# Patient Record
Sex: FEMALE | Race: BLACK OR AFRICAN AMERICAN | NOT HISPANIC OR LATINO | Employment: PART TIME | URBAN - METROPOLITAN AREA
[De-identification: names, ages, dates, MRNs, and addresses within clinical notes are randomized per-mention and may not be internally consistent; named-entity substitution may affect disease eponyms.]

---

## 2023-08-22 ENCOUNTER — LAB (OUTPATIENT)
Dept: LAB | Facility: LAB | Age: 73
End: 2023-08-22
Payer: MEDICARE

## 2023-08-22 LAB
ERYTHROCYTE DISTRIBUTION WIDTH (RATIO) BY AUTOMATED COUNT: 19.6 % (ref 11.5–14.5)
ERYTHROCYTE MEAN CORPUSCULAR HEMOGLOBIN CONCENTRATION (G/DL) BY AUTOMATED: 27.9 G/DL (ref 32–36)
ERYTHROCYTE MEAN CORPUSCULAR VOLUME (FL) BY AUTOMATED COUNT: 75 FL (ref 80–100)
ERYTHROCYTES (10*6/UL) IN BLOOD BY AUTOMATED COUNT: 3.78 X10E12/L (ref 4–5.2)
HEMATOCRIT (%) IN BLOOD BY AUTOMATED COUNT: 28.3 % (ref 36–46)
HEMOGLOBIN (G/DL) IN BLOOD: 7.9 G/DL (ref 12–16)
LEUKOCYTES (10*3/UL) IN BLOOD BY AUTOMATED COUNT: 7.2 X10E9/L (ref 4.4–11.3)
NRBC (PER 100 WBCS) BY AUTOMATED COUNT: 0 /100 WBC (ref 0–0)
PLATELETS (10*3/UL) IN BLOOD AUTOMATED COUNT: 269 X10E9/L (ref 150–450)

## 2023-09-06 ENCOUNTER — LAB (OUTPATIENT)
Dept: LAB | Facility: LAB | Age: 73
End: 2023-09-06
Payer: MEDICARE

## 2023-09-06 LAB
ALANINE AMINOTRANSFERASE (SGPT) (U/L) IN SER/PLAS: 14 U/L (ref 7–45)
ALBUMIN (G/DL) IN SER/PLAS: 4.2 G/DL (ref 3.4–5)
ALKALINE PHOSPHATASE (U/L) IN SER/PLAS: 77 U/L (ref 33–136)
ANION GAP IN SER/PLAS: 15 MMOL/L (ref 10–20)
ASPARTATE AMINOTRANSFERASE (SGOT) (U/L) IN SER/PLAS: 34 U/L (ref 9–39)
BILIRUBIN DIRECT (MG/DL) IN SER/PLAS: 0.1 MG/DL (ref 0–0.3)
BILIRUBIN TOTAL (MG/DL) IN SER/PLAS: 0.4 MG/DL (ref 0–1.2)
CALCIUM (MG/DL) IN SER/PLAS: 9.5 MG/DL (ref 8.6–10.6)
CARBON DIOXIDE, TOTAL (MMOL/L) IN SER/PLAS: 22 MMOL/L (ref 21–32)
CHLORIDE (MMOL/L) IN SER/PLAS: 106 MMOL/L (ref 98–107)
COBALAMIN (VITAMIN B12) (PG/ML) IN SER/PLAS: 760 PG/ML (ref 211–911)
CREATININE (MG/DL) IN SER/PLAS: 1.07 MG/DL (ref 0.5–1.05)
FOLATE (NG/ML) IN SER/PLAS: 13.7 NG/ML
GFR FEMALE: 55 ML/MIN/1.73M2
GLUCOSE (MG/DL) IN SER/PLAS: 98 MG/DL (ref 74–99)
IRON (UG/DL) IN SER/PLAS: 24 UG/DL (ref 35–150)
IRON BINDING CAPACITY (UG/DL) IN SER/PLAS: >474 UG/DL (ref 240–445)
IRON SATURATION (%) IN SER/PLAS: ABNORMAL % (ref 25–45)
POTASSIUM (MMOL/L) IN SER/PLAS: 4 MMOL/L (ref 3.5–5.3)
PROTEIN TOTAL: 8.8 G/DL (ref 6.4–8.2)
SODIUM (MMOL/L) IN SER/PLAS: 139 MMOL/L (ref 136–145)
UREA NITROGEN (MG/DL) IN SER/PLAS: 19 MG/DL (ref 6–23)

## 2024-02-19 ENCOUNTER — HOSPITAL ENCOUNTER (OUTPATIENT)
Dept: RADIOLOGY | Facility: HOSPITAL | Age: 74
Discharge: HOME | End: 2024-02-19
Payer: MEDICARE

## 2024-02-19 ENCOUNTER — OFFICE VISIT (OUTPATIENT)
Dept: PULMONOLOGY | Facility: HOSPITAL | Age: 74
End: 2024-02-19
Payer: MEDICARE

## 2024-02-19 VITALS
BODY MASS INDEX: 27.46 KG/M2 | TEMPERATURE: 96.8 F | OXYGEN SATURATION: 95 % | SYSTOLIC BLOOD PRESSURE: 114 MMHG | DIASTOLIC BLOOD PRESSURE: 77 MMHG | HEART RATE: 93 BPM | WEIGHT: 160 LBS

## 2024-02-19 DIAGNOSIS — J43.9 PULMONARY EMPHYSEMA, UNSPECIFIED EMPHYSEMA TYPE (MULTI): ICD-10-CM

## 2024-02-19 DIAGNOSIS — F17.200 CURRENT SMOKER: ICD-10-CM

## 2024-02-19 DIAGNOSIS — R91.1 LUNG NODULE SEEN ON IMAGING STUDY: Primary | ICD-10-CM

## 2024-02-19 DIAGNOSIS — M54.9 DORSALGIA, UNSPECIFIED: ICD-10-CM

## 2024-02-19 PROCEDURE — 72110 X-RAY EXAM L-2 SPINE 4/>VWS: CPT

## 2024-02-19 PROCEDURE — 99213 OFFICE O/P EST LOW 20 MIN: CPT | Performed by: INTERNAL MEDICINE

## 2024-02-19 PROCEDURE — 99213 OFFICE O/P EST LOW 20 MIN: CPT | Mod: GC | Performed by: INTERNAL MEDICINE

## 2024-02-19 PROCEDURE — 73521 X-RAY EXAM HIPS BI 2 VIEWS: CPT

## 2024-02-19 PROCEDURE — 1126F AMNT PAIN NOTED NONE PRSNT: CPT | Performed by: INTERNAL MEDICINE

## 2024-02-19 PROCEDURE — 72110 X-RAY EXAM L-2 SPINE 4/>VWS: CPT | Performed by: RADIOLOGY

## 2024-02-19 PROCEDURE — 1159F MED LIST DOCD IN RCRD: CPT | Performed by: INTERNAL MEDICINE

## 2024-02-19 SDOH — ECONOMIC STABILITY: FOOD INSECURITY: WITHIN THE PAST 12 MONTHS, YOU WORRIED THAT YOUR FOOD WOULD RUN OUT BEFORE YOU GOT MONEY TO BUY MORE.: NEVER TRUE

## 2024-02-19 SDOH — ECONOMIC STABILITY: FOOD INSECURITY: WITHIN THE PAST 12 MONTHS, THE FOOD YOU BOUGHT JUST DIDN'T LAST AND YOU DIDN'T HAVE MONEY TO GET MORE.: NEVER TRUE

## 2024-02-19 ASSESSMENT — LIFESTYLE VARIABLES
HOW OFTEN DO YOU HAVE SIX OR MORE DRINKS ON ONE OCCASION: NEVER
SKIP TO QUESTIONS 9-10: 1
HOW OFTEN DO YOU HAVE A DRINK CONTAINING ALCOHOL: MONTHLY OR LESS
HOW MANY STANDARD DRINKS CONTAINING ALCOHOL DO YOU HAVE ON A TYPICAL DAY: 1 OR 2
AUDIT-C TOTAL SCORE: 1

## 2024-02-19 ASSESSMENT — PATIENT HEALTH QUESTIONNAIRE - PHQ9
1. LITTLE INTEREST OR PLEASURE IN DOING THINGS: NOT AT ALL
2. FEELING DOWN, DEPRESSED OR HOPELESS: NOT AT ALL
SUM OF ALL RESPONSES TO PHQ9 QUESTIONS 1 & 2: 0

## 2024-02-19 ASSESSMENT — PAIN SCALES - GENERAL: PAINLEVEL: 0-NO PAIN

## 2024-02-19 NOTE — PATIENT INSTRUCTIONS
Thank you for coming today Ms. Holt!    We discussed the following today:  - for the nodule in your lung, it has been stable on the last CT scan in June 2023. You have CT scan requested in June 2024 to follow up. It is ordered & you just need to confirm the appointment with radiology.  - We highly recommend you to stop smoking completely  - Your blood count is low. You are already scheduled for colonoscopy & please follow up with your PCP regarding low blood count & iron deficiency anemia.    We will see you in 4-6 months after CT scan with lung function test at the time of appointment.

## 2024-02-26 PROBLEM — F17.200 CURRENT SMOKER: Status: ACTIVE | Noted: 2024-02-26

## 2024-02-26 PROBLEM — R91.1 LUNG NODULE SEEN ON IMAGING STUDY: Status: ACTIVE | Noted: 2024-02-26

## 2024-02-26 PROBLEM — J43.9 PULMONARY EMPHYSEMA (MULTI): Status: ACTIVE | Noted: 2024-02-26

## 2024-02-26 NOTE — PROGRESS NOTES
Department of Medicine I Division of Pulmonary, Critical Care, and Sleep Medicine   [address] [place] [address]  Phone: [phone]  Fax: [phone]    History of Present Illness   Megan Holt is a 73 y.o. female with history of pulmonary nodules, current smoker, hypertension and right breast cancer (s/p mastectomy in 1993 s/p tamoxifen for 5 years) presents to the pulmonary clinic for f/u.     Patient denies shortness of breath. Endorses chronic cough that has been going on for the last 10 years. Cough is more in the morning, rarely productive. Currently smokes 5-6 cigarettes/day. Denies fevers, weight loss or night sweats. Denies chest pain or palpitation.     CT chest done in November 2022, as part of lung cancer screening, showed multiple nodules, largest one was measuring 7 mm. Repeat CT chest 6/2023 stable.    PFT 8/2023 showed normal spirometry & lung volumes with low DLCO. At that petros, CBC showed Hgb & Iron studies showed CAMRON. Patient denies [place] or PV bleeding or melena. At that time, Patient instructed to follow up with her PCP & referred for screening colonoscopy.     PMH: As above  FH: Denies family history of lung disease  SH: Started smoking as a teenager. Current smoker, now down to 5 cigarettes a day.     ROS:  12 points ROS is otherwise negative except as per HPI     Testing:  PFT: 8/17/2023  normal spirometry & lung volumes. DLCO 38% (uncorrected)     Previous imaging:   CT chest done in November 2022, as part of lung cancer screening, showed multiple nodules, largest one was measuring 7 mm. Repeat CT chest 6/2023 stable     6MWT: 8/17/2023  walked 317 meters, SpO2 99% >> 95%        Past Medical History   above    Immunizations     Immunization History   Administered Date(s) Administered    Moderna SARS-CoV-2 Vaccination 05/13/2021, 06/10/2021, 02/16/2022       Medications and Allergies   No current outpatient medications   Patient has no allergy information on  record.    Social History   above    Family History   above    Surgical History   She has a past surgical history that includes Other surgical history (03/24/2022).    Physical Exam   /77   Pulse 93   Temp 36 °C (96.8 °F)   Wt 72.6 kg (160 lb)   SpO2 95%   BMI 27.46 kg/m²      Physical Exam  Constitutional:       General: She is not in acute distress.  Cardiovascular:      Rate and Rhythm: Normal rate and regular rhythm.      Heart sounds: No murmur heard.  Pulmonary:      Effort: No respiratory distress.      Breath sounds: Normal breath sounds. No stridor. No wheezing.   Abdominal:      General: There is no distension.      Palpations: Abdomen is soft.      Tenderness: There is no abdominal tenderness.   Skin:     General: Skin is warm and dry.   Neurological:      General: No focal deficit present.      Mental Status: She is alert and oriented to person, place, and time.          Results   Pulmonary Function Tests:  PFT: 8/17/2023  normal spirometry & lung volumes. DLCO 38% (uncorrected)     6MWT: 8/17/2023  walked 317 meters, SpO2 99% >> 95%       Chest CT Scan:  CT lung screening follow up CT chest wo IV contrast 06/27/2023  Impression  1. There are no suspicious parenchymal lung nodules. Stable right  lower lobe pleural-based opacity is most likely atelectatic in nature  and continued surveillance with 1 year low-dose chest CT is  recommended.  2. Thyromegaly with heterogeneity of the thyroid gland and  correlation with ultrasound is recommended.  3. Exophytic thyroid cysts.      Labs:  Lab Results   Component Value Date    WBC 7.2 08/22/2023    HGB 7.9 (L) 08/22/2023    HCT 28.3 (L) 08/22/2023    MCV 75 (L) 08/22/2023     08/22/2023       Lab Results   Component Value Date    CREATININE 1.07 (H) 09/06/2023    BUN 19 09/06/2023     09/06/2023    K 4.0 09/06/2023     09/06/2023    CO2 22 09/06/2023        Lab Results   Component Value Date    SEDRATE 17 04/16/2018        IgE: No  "results found for: \"IGE\"   Respiratory Allergy Panel:  AEC:   Eosinophils Absolute (x10E9/L)   Date Value   04/16/2018 0.07     Eosinophils % (%)   Date Value   04/16/2018 1.5       Cultures:  No results found for: \"AFBCX\"   No results found for: \"RESPCULTCYFI\"    No results found for the last 90 days.  C     Assessment and Plan     Problem List Items Addressed This Visit    None  Visit Diagnoses         Codes    Lung nodule seen on imaging study    -  Primary R91.1    Relevant Orders    Follow Up In Pulmonology    Pulmonary emphysema, unspecified emphysema type (CMS/[facility])     J43.9    Relevant Orders    Complete Pulmonary Function Test Pre/Post Bronchodialator (Spirometry Pre/Post/DLCO/Lung Volumes)    Follow Up In Pulmonology          73-year-old female with history of pulmonary nodules, current smoker, hypertension and right breast cancer (s/p mastectomy in 1993 s/p tamoxifen for 5 years) presents to the pulmonary clinic for follow up.     #Pulmonary nodules  - stable on most recent CT chest  - plan repeat CT chest in June 2024     #Tobacco use:  - counseling done for cessation, offered quitting aids     #low DLCO:  - PFT showed substantially low DLCO, with normal spirometry & TLC  - CBC consistent with iron deficiency anemia  - repeat PFTs next visit    #Anemia:  - f/up with PCP  - already referred for screening colonoscopy             Follow-up:  4-6 months    Mg Sweeney MD  02/19/2024  "

## 2024-05-06 ENCOUNTER — HOSPITAL ENCOUNTER (OUTPATIENT)
Dept: RADIOLOGY | Facility: HOSPITAL | Age: 74
Discharge: HOME | End: 2024-05-06
Payer: MEDICARE

## 2024-05-06 VITALS — WEIGHT: 165 LBS | BODY MASS INDEX: 28.17 KG/M2 | HEIGHT: 64 IN

## 2024-05-06 DIAGNOSIS — Z12.31 ENCOUNTER FOR SCREENING MAMMOGRAM FOR MALIGNANT NEOPLASM OF BREAST: ICD-10-CM

## 2024-05-06 PROCEDURE — 77067 SCR MAMMO BI INCL CAD: CPT | Mod: LEFT SIDE | Performed by: RADIOLOGY

## 2024-05-06 PROCEDURE — 77063 BREAST TOMOSYNTHESIS BI: CPT | Mod: LEFT SIDE | Performed by: RADIOLOGY

## 2024-05-06 PROCEDURE — 77067 SCR MAMMO BI INCL CAD: CPT | Mod: LT

## 2024-05-10 DIAGNOSIS — F17.210 SMOKING GREATER THAN 40 PACK YEARS: Primary | ICD-10-CM

## 2024-05-10 DIAGNOSIS — F17.210 TOBACCO SMOKER, 1 PACK OF CIGARETTES OR LESS PER DAY: ICD-10-CM

## 2024-06-10 ENCOUNTER — OFFICE VISIT (OUTPATIENT)
Dept: PULMONOLOGY | Facility: HOSPITAL | Age: 74
End: 2024-06-10
Payer: MEDICARE

## 2024-06-10 VITALS
TEMPERATURE: 97.3 F | DIASTOLIC BLOOD PRESSURE: 86 MMHG | HEART RATE: 96 BPM | SYSTOLIC BLOOD PRESSURE: 120 MMHG | OXYGEN SATURATION: 99 % | BODY MASS INDEX: 27.12 KG/M2 | WEIGHT: 158 LBS

## 2024-06-10 DIAGNOSIS — J43.9 PULMONARY EMPHYSEMA, UNSPECIFIED EMPHYSEMA TYPE (MULTI): ICD-10-CM

## 2024-06-10 DIAGNOSIS — F17.210 SMOKING GREATER THAN 20 PACK YEARS: Primary | ICD-10-CM

## 2024-06-10 DIAGNOSIS — R91.1 LUNG NODULE SEEN ON IMAGING STUDY: ICD-10-CM

## 2024-06-10 PROCEDURE — 1126F AMNT PAIN NOTED NONE PRSNT: CPT | Performed by: INTERNAL MEDICINE

## 2024-06-10 PROCEDURE — 99213 OFFICE O/P EST LOW 20 MIN: CPT | Mod: GC | Performed by: INTERNAL MEDICINE

## 2024-06-10 PROCEDURE — 99213 OFFICE O/P EST LOW 20 MIN: CPT | Performed by: INTERNAL MEDICINE

## 2024-06-10 RX ORDER — LISINOPRIL 20 MG/1
20 TABLET ORAL DAILY
COMMUNITY

## 2024-06-10 RX ORDER — HYDROCHLOROTHIAZIDE 25 MG/1
25 TABLET ORAL DAILY
COMMUNITY

## 2024-06-10 RX ORDER — AMLODIPINE BESYLATE 5 MG/1
5 TABLET ORAL DAILY
COMMUNITY

## 2024-06-10 ASSESSMENT — PAIN SCALES - GENERAL: PAINLEVEL: 0-NO PAIN

## 2024-06-10 NOTE — PROGRESS NOTES
Department of Medicine I Division of Pulmonary, Critical Care, and Sleep Medicine   [address] [place] [address]  Phone: [phone]  Fax: [phone]    History of Present Illness   Megan Holt is a 74 y.o. female with history of pulmonary nodules, current smoker, hypertension and right breast cancer (s/p mastectomy in 1993 s/p tamoxifen for 5 years) presents to the pulmonary clinic for f/u.     Patient denies shortness of breath. Endorses chronic on/off cough that has been going on for the last 10 years. Still smoking, currently smokes 5-6 cigarettes/day. Denies fevers, weight loss or night sweats. Denies chest pain or palpitation.     CT chest done in November 2022, as part of lung cancer screening, showed multiple nodules, largest one was measuring 7 mm. Repeat CT chest 6/2023 stable.     PFT 8/2023 showed normal spirometry & lung volumes with low DLCO. At that petros, CBC showed Hgb & Iron studies showed CAMRON. Patient denies [place] or PV bleeding or melena. At that time, Patient instructed to follow up with her PCP & referred for screening colonoscopy, however, she didn't perform the colonoscopy.     PMH: As above  FH: Denies family history of lung disease  SH: Started smoking as a teenager. Current smoker, now down to 5 cigarettes a day.     ROS:  12 points ROS is otherwise negative except as per HPI     Testing:  PFT: 8/17/2023  normal spirometry & lung volumes. DLCO 38% (uncorrected)     Previous imaging:   CT chest done in November 2022, as part of lung cancer screening, showed multiple nodules, largest one was measuring 7 mm. Repeat CT chest 6/2023 stable     6MWT: 8/17/2023  walked 317 meters, SpO2 99% >> 95%      Review of Systems  Review of Systems  All other review of systems are negative and/or non-contributory.    Past Medical History   She has a past medical history of Breast cancer (Multi) and Current smoker (02/26/2024).    Immunizations     Immunization History  "  Administered Date(s) Administered    Moderna SARS-CoV-2 Vaccination 05/13/2021, 06/10/2021, 02/16/2022       Medications and Allergies   No current outpatient medications   Patient has no allergy information on record.    Social History   She reports that she has been smoking cigarettes. She does not have any smokeless tobacco history on file. She reports current alcohol use. She reports that she does not currently use drugs after having used the following drugs: Marijuana and \"Crack\" cocaine.      Family History     Family History   Problem Relation Name Age of Onset    Breast cancer Sister           Surgical History   She has a past surgical history that includes Other surgical history (03/24/2022) and Mastectomy.    Physical Exam   /86   Pulse 96   Temp 36.3 °C (97.3 °F)   Wt 71.7 kg (158 lb)   LMP  (LMP Unknown)   SpO2 99% Comment: RA  BMI 27.12 kg/m²       Physical Exam   Constitutional:       General: She is not in acute distress.  Cardiovascular:      Rate and Rhythm: Normal rate and regular rhythm.      Heart sounds: No murmur heard.  Pulmonary:      Effort: No respiratory distress.      Breath sounds: Normal breath sounds. No stridor. No wheezing.   Abdominal:      General: There is no distension.      Palpations: Abdomen is soft.      Tenderness: There is no abdominal tenderness.   Skin:     General: Skin is warm and dry.   Neurological:      General: No focal deficit present.      Mental Status: She is alert and oriented to person, place, and time.   Results   Pulmonary Function Tests:  PFT: 8/17/2023  normal spirometry & lung volumes. DLCO 38% (uncorrected)     6MWT: 8/17/2023  walked 317 meters, SpO2 99% >> 95%       Chest CT Scan:  CT lung screening follow up CT chest wo IV contrast 06/27/2023  Impression  1. There are no suspicious parenchymal lung nodules. Stable right  lower lobe pleural-based opacity is most likely atelectatic in nature  and continued surveillance with 1 year low-dose " "chest CT is  recommended.  2. Thyromegaly with heterogeneity of the thyroid gland and  correlation with ultrasound is recommended.  3. Exophytic thyroid cysts.       ECHO:  No results found for this or any previous visit from the past 365 days.       Labs:  Lab Results   Component Value Date    WBC 7.2 08/22/2023    HGB 7.9 (L) 08/22/2023    HCT 28.3 (L) 08/22/2023    MCV 75 (L) 08/22/2023     08/22/2023       Lab Results   Component Value Date    CREATININE 1.07 (H) 09/06/2023    BUN 19 09/06/2023     09/06/2023    K 4.0 09/06/2023     09/06/2023    CO2 22 09/06/2023        Lab Results   Component Value Date    SEDRATE 17 04/16/2018        IgE: No results found for: \"IGE\"   Respiratory Allergy Panel:  AEC:   Eosinophils Absolute (x10E9/L)   Date Value   04/16/2018 0.07     Eosinophils % (%)   Date Value   04/16/2018 1.5       Cultures:  No results found for: \"AFBCX\"   No results found for: \"RESPCULTCYFI\"    No results found for the last 90 days.  C     Assessment and Plan       74-year-old female with history of pulmonary nodules, current smoker, hypertension and right breast cancer (s/p mastectomy in 1993 s/p tamoxifen for 5 years) presents to the pulmonary clinic for follow up.     #Pulmonary nodules  - stable on CT chest last year  - Due for repeat follow up CT chest in June 2024     #Tobacco use:  - counseling done for cessation, offered quitting aids  - referral to smoking cessation aids     #low DLCO:  - PFT showed substantially low DLCO, with normal spirometry & TLC  - CBC consistent with iron deficiency anemia  - repeat PFTs next visit     #Anemia:  - f/up with PCP (Dr. Kelly at [facility])  - Recommend age appropriate cancer screening including colonoscopy           Follow-up: 6 months    Mg Sweeney MD  06/10/2024  "

## 2024-07-22 ENCOUNTER — APPOINTMENT (OUTPATIENT)
Dept: RADIOLOGY | Facility: HOSPITAL | Age: 74
End: 2024-07-22
Payer: MEDICARE

## 2024-08-19 ENCOUNTER — APPOINTMENT (OUTPATIENT)
Dept: PULMONOLOGY | Facility: HOSPITAL | Age: 74
End: 2024-08-19
Payer: MEDICARE

## 2024-09-12 ENCOUNTER — APPOINTMENT (OUTPATIENT)
Dept: PULMONOLOGY | Facility: HOSPITAL | Age: 74
End: 2024-09-12
Payer: MEDICARE

## 2024-10-31 ENCOUNTER — APPOINTMENT (OUTPATIENT)
Dept: PULMONOLOGY | Facility: HOSPITAL | Age: 74
End: 2024-10-31
Payer: MEDICARE

## 2024-12-11 ENCOUNTER — CLINICAL SUPPORT (OUTPATIENT)
Dept: EMERGENCY MEDICINE | Facility: HOSPITAL | Age: 74
End: 2024-12-11
Payer: MEDICARE

## 2024-12-11 ENCOUNTER — APPOINTMENT (OUTPATIENT)
Dept: RADIOLOGY | Facility: HOSPITAL | Age: 74
End: 2024-12-11
Payer: MEDICARE

## 2024-12-11 ENCOUNTER — HOSPITAL ENCOUNTER (INPATIENT)
Facility: HOSPITAL | Age: 74
End: 2024-12-11
Attending: EMERGENCY MEDICINE | Admitting: STUDENT IN AN ORGANIZED HEALTH CARE EDUCATION/TRAINING PROGRAM
Payer: MEDICARE

## 2024-12-11 DIAGNOSIS — Z01.89 NEED FOR PHYSICAL THERAPY ASSESSMENT: ICD-10-CM

## 2024-12-11 DIAGNOSIS — D50.9 IRON DEFICIENCY ANEMIA, UNSPECIFIED IRON DEFICIENCY ANEMIA TYPE: ICD-10-CM

## 2024-12-11 DIAGNOSIS — J18.9 COMMUNITY ACQUIRED PNEUMONIA, UNSPECIFIED LATERALITY: ICD-10-CM

## 2024-12-11 DIAGNOSIS — N12 PYELONEPHRITIS: ICD-10-CM

## 2024-12-11 DIAGNOSIS — N17.9 AKI (ACUTE KIDNEY INJURY) (CMS-HCC): Primary | ICD-10-CM

## 2024-12-11 DIAGNOSIS — I38 ENDOCARDITIS, VALVE UNSPECIFIED: ICD-10-CM

## 2024-12-11 DIAGNOSIS — F17.210 SMOKING GREATER THAN 20 PACK YEARS: ICD-10-CM

## 2024-12-11 DIAGNOSIS — R91.1 LUNG NODULE SEEN ON IMAGING STUDY: ICD-10-CM

## 2024-12-11 DIAGNOSIS — J43.2 CENTRILOBULAR EMPHYSEMA (MULTI): ICD-10-CM

## 2024-12-11 DIAGNOSIS — J43.9 PULMONARY EMPHYSEMA, UNSPECIFIED EMPHYSEMA TYPE (MULTI): ICD-10-CM

## 2024-12-11 DIAGNOSIS — R79.89 ELEVATED TROPONIN: ICD-10-CM

## 2024-12-11 DIAGNOSIS — E87.6 HYPOKALEMIA: ICD-10-CM

## 2024-12-11 LAB
ABO GROUP (TYPE) IN BLOOD: NORMAL
ABO GROUP (TYPE) IN BLOOD: NORMAL
ALBUMIN SERPL BCP-MCNC: 2.9 G/DL (ref 3.4–5)
ALBUMIN SERPL BCP-MCNC: 3.6 G/DL (ref 3.4–5)
ALP SERPL-CCNC: 78 U/L (ref 33–136)
ALT SERPL W P-5'-P-CCNC: 13 U/L (ref 7–45)
ANION GAP BLDV CALCULATED.4IONS-SCNC: 12 MMOL/L (ref 10–25)
ANION GAP SERPL CALC-SCNC: 13 MMOL/L (ref 10–20)
ANION GAP SERPL CALC-SCNC: 23 MMOL/L (ref 10–20)
ANTIBODY SCREEN: NORMAL
APPEARANCE UR: CLEAR
AST SERPL W P-5'-P-CCNC: 40 U/L (ref 9–39)
ATRIAL RATE: 144 BPM
BASE EXCESS BLDV CALC-SCNC: -4.6 MMOL/L (ref -2–3)
BASOPHILS # BLD AUTO: 0.01 X10*3/UL (ref 0–0.1)
BASOPHILS NFR BLD AUTO: 0 %
BILIRUB SERPL-MCNC: 0.5 MG/DL (ref 0–1.2)
BILIRUB UR STRIP.AUTO-MCNC: NEGATIVE MG/DL
BLOOD EXPIRATION DATE: NORMAL
BNP SERPL-MCNC: 197 PG/ML (ref 0–99)
BODY TEMPERATURE: 37 DEGREES CELSIUS
BUN SERPL-MCNC: 41 MG/DL (ref 6–23)
BUN SERPL-MCNC: 43 MG/DL (ref 6–23)
CA-I BLDV-SCNC: 1.12 MMOL/L (ref 1.1–1.33)
CALCIUM SERPL-MCNC: 8.1 MG/DL (ref 8.6–10.6)
CALCIUM SERPL-MCNC: 9.5 MG/DL (ref 8.6–10.6)
CARDIAC TROPONIN I PNL SERPL HS: 105 NG/L (ref 0–34)
CARDIAC TROPONIN I PNL SERPL HS: 184 NG/L (ref 0–34)
CARDIAC TROPONIN I PNL SERPL HS: 603 NG/L (ref 0–34)
CHLORIDE BLDV-SCNC: 108 MMOL/L (ref 98–107)
CHLORIDE SERPL-SCNC: 100 MMOL/L (ref 98–107)
CHLORIDE SERPL-SCNC: 104 MMOL/L (ref 98–107)
CO2 SERPL-SCNC: 16 MMOL/L (ref 21–32)
CO2 SERPL-SCNC: 20 MMOL/L (ref 21–32)
COLOR UR: ABNORMAL
CREAT SERPL-MCNC: 1.73 MG/DL (ref 0.5–1.05)
CREAT SERPL-MCNC: 1.79 MG/DL (ref 0.5–1.05)
DISPENSE STATUS: NORMAL
EGFRCR SERPLBLD CKD-EPI 2021: 29 ML/MIN/1.73M*2
EGFRCR SERPLBLD CKD-EPI 2021: 31 ML/MIN/1.73M*2
EOSINOPHIL # BLD AUTO: 0 X10*3/UL (ref 0–0.4)
EOSINOPHIL NFR BLD AUTO: 0 %
ERYTHROCYTE [DISTWIDTH] IN BLOOD BY AUTOMATED COUNT: 17.4 % (ref 11.5–14.5)
FLUAV RNA RESP QL NAA+PROBE: NOT DETECTED
FLUBV RNA RESP QL NAA+PROBE: NOT DETECTED
GLUCOSE BLDV-MCNC: 119 MG/DL (ref 74–99)
GLUCOSE SERPL-MCNC: 133 MG/DL (ref 74–99)
GLUCOSE SERPL-MCNC: 174 MG/DL (ref 74–99)
GLUCOSE UR STRIP.AUTO-MCNC: NORMAL MG/DL
HCO3 BLDV-SCNC: 19.6 MMOL/L (ref 22–26)
HCT VFR BLD AUTO: 20.1 % (ref 36–46)
HCT VFR BLD EST: 19 % (ref 36–46)
HGB BLD-MCNC: 6.3 G/DL (ref 12–16)
HGB BLDV-MCNC: 6.4 G/DL (ref 12–16)
HOLD SPECIMEN: NORMAL
IMM GRANULOCYTES # BLD AUTO: 0.22 X10*3/UL (ref 0–0.5)
IMM GRANULOCYTES NFR BLD AUTO: 1.1 % (ref 0–0.9)
INHALED O2 CONCENTRATION: 21 %
KETONES UR STRIP.AUTO-MCNC: NEGATIVE MG/DL
LACTATE BLDV-SCNC: 1.1 MMOL/L (ref 0.4–2)
LACTATE SERPL-SCNC: 0.4 MMOL/L (ref 0.4–2)
LACTATE SERPL-SCNC: 6.8 MMOL/L (ref 0.4–2)
LDH SERPL L TO P-CCNC: 281 U/L (ref 84–246)
LEUKOCYTE ESTERASE UR QL STRIP.AUTO: ABNORMAL
LYMPHOCYTES # BLD AUTO: 0.78 X10*3/UL (ref 0.8–3)
LYMPHOCYTES NFR BLD AUTO: 3.9 %
MAGNESIUM SERPL-MCNC: 1.95 MG/DL (ref 1.6–2.4)
MCH RBC QN AUTO: 20.7 PG (ref 26–34)
MCHC RBC AUTO-ENTMCNC: 31.3 G/DL (ref 32–36)
MCV RBC AUTO: 66 FL (ref 80–100)
MONOCYTES # BLD AUTO: 0.59 X10*3/UL (ref 0.05–0.8)
MONOCYTES NFR BLD AUTO: 2.9 %
MRSA DNA SPEC QL NAA+PROBE: NOT DETECTED
MUCOUS THREADS #/AREA URNS AUTO: ABNORMAL /LPF
NEUTROPHILS # BLD AUTO: 18.62 X10*3/UL (ref 1.6–5.5)
NEUTROPHILS NFR BLD AUTO: 92.1 %
NITRITE UR QL STRIP.AUTO: NEGATIVE
NRBC BLD-RTO: 0.8 /100 WBCS (ref 0–0)
OXYHGB MFR BLDV: 70.2 % (ref 45–75)
P AXIS: 61 DEGREES
P OFFSET: 206 MS
P ONSET: 159 MS
PCO2 BLDV: 31 MM HG (ref 41–51)
PH BLDV: 7.41 PH (ref 7.33–7.43)
PH UR STRIP.AUTO: 5.5 [PH]
PHOSPHATE SERPL-MCNC: 2.7 MG/DL (ref 2.5–4.9)
PLATELET # BLD AUTO: 367 X10*3/UL (ref 150–450)
PO2 BLDV: 42 MM HG (ref 35–45)
POTASSIUM BLDV-SCNC: 3 MMOL/L (ref 3.5–5.3)
POTASSIUM SERPL-SCNC: 2.9 MMOL/L (ref 3.5–5.3)
POTASSIUM SERPL-SCNC: 2.9 MMOL/L (ref 3.5–5.3)
PR INTERVAL: 138 MS
PRODUCT BLOOD TYPE: 5100
PRODUCT CODE: NORMAL
PROT SERPL-MCNC: 8.7 G/DL (ref 6.4–8.2)
PROT UR STRIP.AUTO-MCNC: ABNORMAL MG/DL
Q ONSET: 228 MS
QRS COUNT: 23 BEATS
QRS DURATION: 82 MS
QT INTERVAL: 254 MS
QTC CALCULATION(BAZETT): 393 MS
QTC FREDERICIA: 339 MS
R AXIS: 18 DEGREES
RBC # BLD AUTO: 3.04 X10*6/UL (ref 4–5.2)
RBC # UR STRIP.AUTO: ABNORMAL /UL
RBC #/AREA URNS AUTO: ABNORMAL /HPF
RH FACTOR (ANTIGEN D): NORMAL
RH FACTOR (ANTIGEN D): NORMAL
RSV RNA RESP QL NAA+PROBE: NOT DETECTED
SAO2 % BLDV: 72 % (ref 45–75)
SARS-COV-2 RNA RESP QL NAA+PROBE: NOT DETECTED
SODIUM BLDV-SCNC: 137 MMOL/L (ref 136–145)
SODIUM SERPL-SCNC: 134 MMOL/L (ref 136–145)
SODIUM SERPL-SCNC: 136 MMOL/L (ref 136–145)
SP GR UR STRIP.AUTO: 1.02
SQUAMOUS #/AREA URNS AUTO: ABNORMAL /HPF
T AXIS: 69 DEGREES
T OFFSET: 355 MS
UNIT ABO: NORMAL
UNIT NUMBER: NORMAL
UNIT RH: NORMAL
UNIT VOLUME: 350
UROBILINOGEN UR STRIP.AUTO-MCNC: NORMAL MG/DL
VENTRICULAR RATE: 144 BPM
WBC # BLD AUTO: 20.2 X10*3/UL (ref 4.4–11.3)
WBC #/AREA URNS AUTO: ABNORMAL /HPF
XM INTEP: NORMAL

## 2024-12-11 PROCEDURE — 74177 CT ABD & PELVIS W/CONTRAST: CPT | Performed by: RADIOLOGY

## 2024-12-11 PROCEDURE — 82435 ASSAY OF BLOOD CHLORIDE: CPT

## 2024-12-11 PROCEDURE — 81001 URINALYSIS AUTO W/SCOPE: CPT | Performed by: EMERGENCY MEDICINE

## 2024-12-11 PROCEDURE — 96367 TX/PROPH/DG ADDL SEQ IV INF: CPT

## 2024-12-11 PROCEDURE — 87493 C DIFF AMPLIFIED PROBE: CPT | Performed by: STUDENT IN AN ORGANIZED HEALTH CARE EDUCATION/TRAINING PROGRAM

## 2024-12-11 PROCEDURE — 36415 COLL VENOUS BLD VENIPUNCTURE: CPT | Performed by: PHYSICIAN ASSISTANT

## 2024-12-11 PROCEDURE — 87899 AGENT NOS ASSAY W/OPTIC: CPT | Performed by: STUDENT IN AN ORGANIZED HEALTH CARE EDUCATION/TRAINING PROGRAM

## 2024-12-11 PROCEDURE — 86923 COMPATIBILITY TEST ELECTRIC: CPT

## 2024-12-11 PROCEDURE — 2500000004 HC RX 250 GENERAL PHARMACY W/ HCPCS (ALT 636 FOR OP/ED): Performed by: EMERGENCY MEDICINE

## 2024-12-11 PROCEDURE — 83735 ASSAY OF MAGNESIUM: CPT | Performed by: EMERGENCY MEDICINE

## 2024-12-11 PROCEDURE — 99291 CRITICAL CARE FIRST HOUR: CPT | Performed by: EMERGENCY MEDICINE

## 2024-12-11 PROCEDURE — 36415 COLL VENOUS BLD VENIPUNCTURE: CPT | Performed by: EMERGENCY MEDICINE

## 2024-12-11 PROCEDURE — 84484 ASSAY OF TROPONIN QUANT: CPT | Performed by: EMERGENCY MEDICINE

## 2024-12-11 PROCEDURE — 2500000001 HC RX 250 WO HCPCS SELF ADMINISTERED DRUGS (ALT 637 FOR MEDICARE OP)

## 2024-12-11 PROCEDURE — 36430 TRANSFUSION BLD/BLD COMPNT: CPT

## 2024-12-11 PROCEDURE — 87449 NOS EACH ORGANISM AG IA: CPT | Performed by: STUDENT IN AN ORGANIZED HEALTH CARE EDUCATION/TRAINING PROGRAM

## 2024-12-11 PROCEDURE — 83880 ASSAY OF NATRIURETIC PEPTIDE: CPT | Performed by: EMERGENCY MEDICINE

## 2024-12-11 PROCEDURE — 86901 BLOOD TYPING SEROLOGIC RH(D): CPT | Performed by: PHYSICIAN ASSISTANT

## 2024-12-11 PROCEDURE — 2500000004 HC RX 250 GENERAL PHARMACY W/ HCPCS (ALT 636 FOR OP/ED)

## 2024-12-11 PROCEDURE — 87506 IADNA-DNA/RNA PROBE TQ 6-11: CPT | Performed by: STUDENT IN AN ORGANIZED HEALTH CARE EDUCATION/TRAINING PROGRAM

## 2024-12-11 PROCEDURE — 71275 CT ANGIOGRAPHY CHEST: CPT

## 2024-12-11 PROCEDURE — 85025 COMPLETE CBC W/AUTO DIFF WBC: CPT | Performed by: EMERGENCY MEDICINE

## 2024-12-11 PROCEDURE — 87324 CLOSTRIDIUM AG IA: CPT | Performed by: STUDENT IN AN ORGANIZED HEALTH CARE EDUCATION/TRAINING PROGRAM

## 2024-12-11 PROCEDURE — 2550000001 HC RX 255 CONTRASTS: Performed by: EMERGENCY MEDICINE

## 2024-12-11 PROCEDURE — 1200000002 HC GENERAL ROOM WITH TELEMETRY DAILY

## 2024-12-11 PROCEDURE — 71045 X-RAY EXAM CHEST 1 VIEW: CPT | Performed by: RADIOLOGY

## 2024-12-11 PROCEDURE — 93005 ELECTROCARDIOGRAM TRACING: CPT

## 2024-12-11 PROCEDURE — 87040 BLOOD CULTURE FOR BACTERIA: CPT | Performed by: EMERGENCY MEDICINE

## 2024-12-11 PROCEDURE — 83605 ASSAY OF LACTIC ACID: CPT | Performed by: PHYSICIAN ASSISTANT

## 2024-12-11 PROCEDURE — 93010 ELECTROCARDIOGRAM REPORT: CPT | Performed by: EMERGENCY MEDICINE

## 2024-12-11 PROCEDURE — 71045 X-RAY EXAM CHEST 1 VIEW: CPT

## 2024-12-11 PROCEDURE — P9016 RBC LEUKOCYTES REDUCED: HCPCS

## 2024-12-11 PROCEDURE — 74177 CT ABD & PELVIS W/CONTRAST: CPT

## 2024-12-11 PROCEDURE — 80053 COMPREHEN METABOLIC PANEL: CPT | Performed by: EMERGENCY MEDICINE

## 2024-12-11 PROCEDURE — 96366 THER/PROPH/DIAG IV INF ADDON: CPT

## 2024-12-11 PROCEDURE — 71275 CT ANGIOGRAPHY CHEST: CPT | Performed by: STUDENT IN AN ORGANIZED HEALTH CARE EDUCATION/TRAINING PROGRAM

## 2024-12-11 PROCEDURE — 84484 ASSAY OF TROPONIN QUANT: CPT | Performed by: PHYSICIAN ASSISTANT

## 2024-12-11 PROCEDURE — 96365 THER/PROPH/DIAG IV INF INIT: CPT

## 2024-12-11 PROCEDURE — 2500000002 HC RX 250 W HCPCS SELF ADMINISTERED DRUGS (ALT 637 FOR MEDICARE OP, ALT 636 FOR OP/ED): Performed by: PHYSICIAN ASSISTANT

## 2024-12-11 PROCEDURE — 87086 URINE CULTURE/COLONY COUNT: CPT | Performed by: EMERGENCY MEDICINE

## 2024-12-11 PROCEDURE — 83615 LACTATE (LD) (LDH) ENZYME: CPT | Performed by: STUDENT IN AN ORGANIZED HEALTH CARE EDUCATION/TRAINING PROGRAM

## 2024-12-11 PROCEDURE — 2500000004 HC RX 250 GENERAL PHARMACY W/ HCPCS (ALT 636 FOR OP/ED): Performed by: PHYSICIAN ASSISTANT

## 2024-12-11 PROCEDURE — 87641 MR-STAPH DNA AMP PROBE: CPT

## 2024-12-11 PROCEDURE — 87637 SARSCOV2&INF A&B&RSV AMP PRB: CPT | Performed by: EMERGENCY MEDICINE

## 2024-12-11 PROCEDURE — 2500000001 HC RX 250 WO HCPCS SELF ADMINISTERED DRUGS (ALT 637 FOR MEDICARE OP): Performed by: PHYSICIAN ASSISTANT

## 2024-12-11 RX ORDER — IBUPROFEN 800 MG/1
800 TABLET ORAL EVERY 8 HOURS PRN
COMMUNITY

## 2024-12-11 RX ORDER — GUAIFENESIN 600 MG/1
600 TABLET, EXTENDED RELEASE ORAL 2 TIMES DAILY PRN
Status: DISCONTINUED | OUTPATIENT
Start: 2024-12-11 | End: 2024-12-12

## 2024-12-11 RX ORDER — POLYETHYLENE GLYCOL 3350 17 G/17G
17 POWDER, FOR SOLUTION ORAL DAILY
Status: DISCONTINUED | OUTPATIENT
Start: 2024-12-11 | End: 2024-12-12

## 2024-12-11 RX ORDER — ACETAMINOPHEN 325 MG/1
TABLET ORAL
Status: COMPLETED
Start: 2024-12-11 | End: 2024-12-11

## 2024-12-11 RX ORDER — AMLODIPINE BESYLATE 5 MG/1
5 TABLET ORAL DAILY
Status: DISCONTINUED | OUTPATIENT
Start: 2024-12-11 | End: 2024-12-19 | Stop reason: HOSPADM

## 2024-12-11 RX ORDER — VANCOMYCIN HYDROCHLORIDE 1 G/20ML
INJECTION, POWDER, LYOPHILIZED, FOR SOLUTION INTRAVENOUS DAILY PRN
Status: DISCONTINUED | OUTPATIENT
Start: 2024-12-11 | End: 2024-12-12

## 2024-12-11 RX ORDER — VANCOMYCIN HYDROCHLORIDE 1 G/200ML
2 INJECTION, SOLUTION INTRAVENOUS ONCE
Status: COMPLETED | OUTPATIENT
Start: 2024-12-11 | End: 2024-12-11

## 2024-12-11 RX ORDER — POTASSIUM CHLORIDE 20 MEQ/1
60 TABLET, EXTENDED RELEASE ORAL ONCE
Status: COMPLETED | OUTPATIENT
Start: 2024-12-11 | End: 2024-12-11

## 2024-12-11 RX ORDER — ACETAMINOPHEN 325 MG/1
650 TABLET ORAL ONCE
Status: COMPLETED | OUTPATIENT
Start: 2024-12-11 | End: 2024-12-11

## 2024-12-11 RX ORDER — IBUPROFEN 800 MG/1
800 TABLET ORAL EVERY 8 HOURS PRN
COMMUNITY
End: 2024-12-11 | Stop reason: ENTERED-IN-ERROR

## 2024-12-11 RX ORDER — NAPROXEN SODIUM 220 MG/1
324 TABLET, FILM COATED ORAL ONCE
Status: COMPLETED | OUTPATIENT
Start: 2024-12-11 | End: 2024-12-11

## 2024-12-11 RX ORDER — POTASSIUM CHLORIDE 14.9 MG/ML
20 INJECTION INTRAVENOUS
Status: DISCONTINUED | OUTPATIENT
Start: 2024-12-11 | End: 2024-12-12

## 2024-12-11 RX ORDER — ACETAMINOPHEN 325 MG/1
975 TABLET ORAL ONCE
Status: COMPLETED | OUTPATIENT
Start: 2024-12-11 | End: 2024-12-11

## 2024-12-11 RX ORDER — HYDROCHLOROTHIAZIDE 25 MG/1
25 TABLET ORAL DAILY
Status: DISCONTINUED | OUTPATIENT
Start: 2024-12-11 | End: 2024-12-19 | Stop reason: HOSPADM

## 2024-12-11 RX ORDER — AZITHROMYCIN 500 MG/1
500 TABLET, FILM COATED ORAL
Status: DISCONTINUED | OUTPATIENT
Start: 2024-12-12 | End: 2024-12-13

## 2024-12-11 RX ORDER — LISINOPRIL 20 MG/1
20 TABLET ORAL DAILY
Status: DISCONTINUED | OUTPATIENT
Start: 2024-12-11 | End: 2024-12-19 | Stop reason: HOSPADM

## 2024-12-11 ASSESSMENT — PAIN DESCRIPTION - LOCATION: LOCATION: GENERALIZED

## 2024-12-11 ASSESSMENT — PAIN DESCRIPTION - PAIN TYPE: TYPE: ACUTE PAIN

## 2024-12-11 ASSESSMENT — PAIN DESCRIPTION - DESCRIPTORS: DESCRIPTORS: ACHING

## 2024-12-11 ASSESSMENT — LIFESTYLE VARIABLES
EVER HAD A DRINK FIRST THING IN THE MORNING TO STEADY YOUR NERVES TO GET RID OF A HANGOVER: NO
HAVE YOU EVER FELT YOU SHOULD CUT DOWN ON YOUR DRINKING: NO
EVER FELT BAD OR GUILTY ABOUT YOUR DRINKING: NO
TOTAL SCORE: 0
HAVE PEOPLE ANNOYED YOU BY CRITICIZING YOUR DRINKING: NO

## 2024-12-11 ASSESSMENT — COLUMBIA-SUICIDE SEVERITY RATING SCALE - C-SSRS
2. HAVE YOU ACTUALLY HAD ANY THOUGHTS OF KILLING YOURSELF?: NO
6. HAVE YOU EVER DONE ANYTHING, STARTED TO DO ANYTHING, OR PREPARED TO DO ANYTHING TO END YOUR LIFE?: NO
1. IN THE PAST MONTH, HAVE YOU WISHED YOU WERE DEAD OR WISHED YOU COULD GO TO SLEEP AND NOT WAKE UP?: NO

## 2024-12-11 ASSESSMENT — PAIN SCALES - GENERAL
PAINLEVEL_OUTOF10: 0 - NO PAIN
PAINLEVEL_OUTOF10: 3
PAINLEVEL_OUTOF10: 4

## 2024-12-11 ASSESSMENT — PAIN - FUNCTIONAL ASSESSMENT
PAIN_FUNCTIONAL_ASSESSMENT: 0-10

## 2024-12-11 NOTE — PROGRESS NOTES
Patient has been identified as having an emergent need for administration of iodinated contrast for CT scan prior to result of laboratory studies OR despite known elevated GFR due to possibility of life and/or limb threatening pathology.    I acknowledge the risks and benefits of emergently proceeding with contrast administration including that, at present, it is the position of the [employer] that contrast induced nephropathy (JANETT) is a rare but possible consequence. At this time the benefits of proceeding with contrast administration outweigh the risks.    Attempts will be made to mitigate possible JANETT risk with IV fluid hydration if able.    Silvina Wetzel [facility], PA-C

## 2024-12-11 NOTE — ED PROCEDURE NOTE
Procedure  Critical Care    Performed by: Clem Berrios MD  Authorized by: Clem Berrios MD    Critical care provider statement:     Critical care time (minutes):  37    Critical care time was exclusive of:  Separately billable procedures and treating other patients and teaching time    Critical care was necessary to treat or prevent imminent or life-threatening deterioration of the following conditions:  Shock and sepsis    Critical care was time spent personally by me on the following activities:  Development of treatment plan with patient or surrogate, evaluation of patient's response to treatment, examination of patient, obtaining history from patient or surrogate, ordering and performing treatments and interventions, ordering and review of laboratory studies, re-evaluation of patient's condition, ordering and review of radiographic studies, pulse oximetry and review of old charts    Care discussed with: admitting provider                 Clem Berrios MD  12/11/24 1405

## 2024-12-11 NOTE — CONSULTS
Vancomycin Dosing by Pharmacy- INITIAL    Megan Holt is a 74 y.o. year old female who Pharmacy has been consulted for vancomycin dosing for pneumonia. Based on the patient's indication and renal status this patient will be dosed based on a goal trough/random level of 15-20.     Renal function is currently declining.    Visit Vitals  /68   Pulse 92   Temp 36.1 °C (97 °F) (Tympanic)   Resp 16        Lab Results   Component Value Date    CREATININE 1.79 (H) 2024    CREATININE 1.07 (H) 2023    CREATININE 0.71 2018        Patient weight is as follows:   Vitals:    24 1130   Weight: 71.7 kg (158 lb)       Cultures:  No results found for the encounter in last 14 days.        No intake/output data recorded.  I/O during current shift:  I/O this shift:  In: 2750 [IV Piggyback:2750]  Out: -     Temp (24hrs), Av.3 °C (99.1 °F), Min:36.1 °C (97 °F), Max:39.5 °C (103.1 °F)         Assessment/Plan     Patient will be given a loading dose of 2000 mg.  Will initiate vancomycin maintenance, dosing off levels.  Follow-up level will be ordered on  at 0900 unless clinically indicated sooner.  Will continue to monitor renal function daily while on vancomycin and order serum creatinine at least every 48 hours if not already ordered.  Follow for continued vancomycin needs, clinical response, and signs/symptoms of toxicity.       Melinda Borrego, JoseD

## 2024-12-11 NOTE — H&P
History Of Present Illness  Megan Holt is a 74 y.o. female with a PMHx of pulmonary nodules, tobacco use, HTN, R breast cancer (s/p mastectomy 1993, s/p tamoxifen for 5 years), and recent appendicitis (s/p appendectomy 10/4/24) presenting with urinary incontinence, cough, and shortness of breath.    Patient reports experiencing decreased appetite, urinary incontinence, left back pain, non-productive cough, and diarrhea which began 1 week ago. The patient did not note any apparent triggers or causes for her initial symptoms. Yesterday, her daughter found her pale and shivering with shortness of breath, so she called emergency services. 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 Her daughter also reported no history of illness in either herself or the patient's grandchildren. No reports of sputum production, losses of consciousness, bloody stools, hematuria, or vomiting.    Per EMS, patient was hypoxic to the low 80s on room air and was placed on 4L O2 with improvement to the mid 90s, as well as a HR in the 150s-160s.     ED Course:  T 39.5 C, , RR 22, /70, SpO2 93% on 4L O2   WBC 20.2, hgb 6.3, K 2.8, bicarb 16, Cr 1.79 (vs baseline Cr 1.0), troponin 184>603>105, lactate 6.8  UA was positive for leukocyte esterase and WBCs  Covid, RSV and Flu negative  EKG: sinus tachycardia  Chest x-ray: increased lung markings bilaterally suggestive of possible atypical/viral pneumonia  CT angio chest: no evidence of PEs, moderate centrilobular pulmonary edema, a new 5 mm pulmonary nodule, and diffuse bilateral interlobular septal thickening  CT A/P: evidence of pyelonephritis     The patient was started on 2.25L NS, PO potassium chloride, Tylenol for fever, vancomycin and Zosyn. Azithromycin was added to antibiotic course based on concern for pneumonia. Home antihypertensives were held due to concern for sepsis. Repeat lactate was 0.4. Blood cultures were collected. Patient was  "admitted to medicine for further management.      Past Medical History  She has a past medical history of Breast cancer (Multi) and Current smoker (02/26/2024).    Surgical History  She has a past surgical history that includes Other surgical history (03/24/2022) and Mastectomy.     Social History  She reports that she has been smoking cigarettes. She has never used smokeless tobacco. She reports current alcohol use. She reports that she does not currently use drugs after having used the following drugs: Marijuana and \"Crack\" cocaine.    Family History  Family History   Problem Relation Name Age of Onset    Breast cancer Sister          Allergies  Patient has no known allergies.    Review of Systems  As stated in HPI     Physical Exam  Constitutional:       Appearance: Normal appearance.   HENT:      Head: Normocephalic and atraumatic.      Mouth/Throat:      Mouth: Mucous membranes are moist.      Pharynx: Oropharynx is clear.   Eyes:      Extraocular Movements: Extraocular movements intact.      Conjunctiva/sclera: Conjunctivae normal.      Pupils: Pupils are equal, round, and reactive to light.   Cardiovascular:      Rate and Rhythm: Normal rate and regular rhythm.      Pulses: Normal pulses.      Heart sounds: Normal heart sounds.   Pulmonary:      Effort: Pulmonary effort is normal. No respiratory distress.      Breath sounds: Normal breath sounds.      Comments: On supplemental oxygen, occasional cough  Abdominal:      General: Bowel sounds are normal. There is no distension.      Palpations: Abdomen is soft.      Tenderness: There is abdominal tenderness.      Comments: Mild epigastric tenderness with palpation   Musculoskeletal:      Comments: No peripheral edema   Skin:     General: Skin is warm and dry.   Neurological:      Mental Status: She is alert and oriented to person, place, and time.   Psychiatric:         Mood and Affect: Mood normal.          Last Recorded Vitals  BP 99/60   Pulse 93   Temp " 37.4 °C (99.4 °F) (Oral)   Resp 16   Wt 71.7 kg (158 lb)   SpO2 100%     Relevant Results  Results for orders placed or performed during the hospital encounter of 12/11/24 (from the past 24 hours)   CBC and Auto Differential   Result Value Ref Range    WBC 20.2 (H) 4.4 - 11.3 x10*3/uL    nRBC 0.8 (H) 0.0 - 0.0 /100 WBCs    RBC 3.04 (L) 4.00 - 5.20 x10*6/uL    Hemoglobin 6.3 (LL) 12.0 - 16.0 g/dL    Hematocrit 20.1 (L) 36.0 - 46.0 %    MCV 66 (L) 80 - 100 fL    MCH 20.7 (L) 26.0 - 34.0 pg    MCHC 31.3 (L) 32.0 - 36.0 g/dL    RDW 17.4 (H) 11.5 - 14.5 %    Platelets 367 150 - 450 x10*3/uL    Neutrophils % 92.1 40.0 - 80.0 %    Immature Granulocytes %, Automated 1.1 (H) 0.0 - 0.9 %    Lymphocytes % 3.9 13.0 - 44.0 %    Monocytes % 2.9 2.0 - 10.0 %    Eosinophils % 0.0 0.0 - 6.0 %    Basophils % 0.0 0.0 - 2.0 %    Neutrophils Absolute 18.62 (H) 1.60 - 5.50 x10*3/uL    Immature Granulocytes Absolute, Automated 0.22 0.00 - 0.50 x10*3/uL    Lymphocytes Absolute 0.78 (L) 0.80 - 3.00 x10*3/uL    Monocytes Absolute 0.59 0.05 - 0.80 x10*3/uL    Eosinophils Absolute 0.00 0.00 - 0.40 x10*3/uL    Basophils Absolute 0.01 0.00 - 0.10 x10*3/uL   Comprehensive Metabolic Panel   Result Value Ref Range    Glucose 174 (H) 74 - 99 mg/dL    Sodium 136 136 - 145 mmol/L    Potassium 2.9 (LL) 3.5 - 5.3 mmol/L    Chloride 100 98 - 107 mmol/L    Bicarbonate 16 (L) 21 - 32 mmol/L    Anion Gap 23 (H) 10 - 20 mmol/L    Urea Nitrogen 43 (H) 6 - 23 mg/dL    Creatinine 1.79 (H) 0.50 - 1.05 mg/dL    eGFR 29 (L) >60 mL/min/1.73m*2    Calcium 9.5 8.6 - 10.6 mg/dL    Albumin 3.6 3.4 - 5.0 g/dL    Alkaline Phosphatase 78 33 - 136 U/L    Total Protein 8.7 (H) 6.4 - 8.2 g/dL    AST 40 (H) 9 - 39 U/L    Bilirubin, Total 0.5 0.0 - 1.2 mg/dL    ALT 13 7 - 45 U/L   Blood Culture    Specimen: Peripheral Venipuncture; Blood culture   Result Value Ref Range    Blood Culture Loaded on Instrument - Culture in progress    Blood Culture    Specimen: Peripheral  Venipuncture; Blood culture   Result Value Ref Range    Blood Culture Loaded on Instrument - Culture in progress    Magnesium   Result Value Ref Range    Magnesium 1.95 1.60 - 2.40 mg/dL   B-type natriuretic peptide   Result Value Ref Range     (H) 0 - 99 pg/mL   Troponin I, High Sensitivity, Initial   Result Value Ref Range    Troponin I, High Sensitivity (CMC) 184 (HH) 0 - 34 ng/L   Sars-CoV-2 and Influenza A/B PCR   Result Value Ref Range    Flu A Result Not Detected Not Detected    Flu B Result Not Detected Not Detected    Coronavirus 2019, PCR Not Detected Not Detected   RSV PCR   Result Value Ref Range    RSV PCR Not Detected Not Detected   Lactate   Result Value Ref Range    Lactate 6.8 (HH) 0.4 - 2.0 mmol/L   Type and Screen   Result Value Ref Range    ABO TYPE O     Rh TYPE POS     ANTIBODY SCREEN NEG    Troponin, High Sensitivity, 1 Hour   Result Value Ref Range    Troponin I, High Sensitivity (CMC) 603 (HH) 0 - 34 ng/L   Light Blue Top   Result Value Ref Range    Extra Tube Hold for add-ons.    Lactate   Result Value Ref Range    Lactate 0.4 0.4 - 2.0 mmol/L   Urinalysis with Reflex Culture and Microscopic   Result Value Ref Range    Color, Urine Light-Yellow Light-Yellow, Yellow, Dark-Yellow    Appearance, Urine Clear Clear    Specific Gravity, Urine 1.021 1.005 - 1.035    pH, Urine 5.5 5.0, 5.5, 6.0, 6.5, 7.0, 7.5, 8.0    Protein, Urine 70 (1+) (A) NEGATIVE, 10 (TRACE), 20 (TRACE) mg/dL    Glucose, Urine Normal Normal mg/dL    Blood, Urine 0.06 (1+) (A) NEGATIVE    Ketones, Urine NEGATIVE NEGATIVE mg/dL    Bilirubin, Urine NEGATIVE NEGATIVE    Urobilinogen, Urine Normal Normal mg/dL    Nitrite, Urine NEGATIVE NEGATIVE    Leukocyte Esterase, Urine 250 Terese/µL (A) NEGATIVE   Microscopic Only, Urine   Result Value Ref Range    WBC, Urine 21-50 (A) 1-5, NONE /HPF    RBC, Urine 3-5 NONE, 1-2, 3-5 /HPF    Squamous Epithelial Cells, Urine 1-9 (SPARSE) Reference range not established. /HPF    Mucus, Urine  FEW Reference range not established. /LPF   Troponin I, High Sensitivity   Result Value Ref Range    Troponin I, High Sensitivity (CMC) 105 (H) 0 - 34 ng/L        CT angio chest for pulmonary embolism    Result Date: 12/11/2024  Interpreted By:  Kaz Sousa, STUDY: CT ANGIO CHEST FOR PULMONARY EMBOLISM;  12/11/2024 1:31 pm   INDICATION: Signs/Symptoms:sob, hypoxic.   74-year-old female past medical history remote breast cancer status post right breast mastectomy, hypertension, recent appendectomy on 10/04 status post appendicitis with perforation who presents to the ED with flu-like symptoms that began yesterday. In the emergency department patient is tachycardic with heart rates to the 150s and hypoxic to the low 80s.   COMPARISON: CT chest 06/27/2023, CT abdomen pelvis 10/03/2024   ACCESSION NUMBER(S): [id number]   ORDERING CLINICIAN: AWILDA JAIME   TECHNIQUE: Helical data acquisition of the chest was obtained after intravenous administration of 90 ML Omnipaque 350, as per PE protocol. Images were reformatted in coronal and sagittal planes. Axial and coronal maximum intensity projection (MIP) images were created and reviewed.   FINDINGS: POTENTIAL LIMITATIONS OF THE STUDY: None   HEART AND VESSELS: There are no discrete filling defects within main pulmonary artery and its branches to suggest acute pulmonary embolism. Main pulmonary artery and its branches are normal in caliber.   The thoracic aorta normal in course and caliber.There is moderate atherosclerosis present, including calcified and noncalcified plaques.Although, the study is not tailored for evaluation of aorta, there is no definite evidence of acute aortic pathology. Moderate coronary artery calcifications are seen. Please note,the study is not optimized for evaluation of coronary arteries.   The cardiac chambers are not enlarged.There are no findings to suggest right heart strain. There is no pericardial effusion seen.   MEDIASTINUM AND PORTILLO,  LOWER NECK AND AXILLA: Stable appearance of thyromegaly with right lobe heterogeneity. No evidence of thoracic lymphadenopathy by CT criteria. Esophagus appears within normal limits as seen.   LUNGS AND AIRWAYS: The trachea and central airways are patent. No endobronchial lesion is seen.   There are bilateral basilar airspace opacities, which are homogeneously enhancing, and likely felt to be atelectatic in nature. There is diffuse bilateral interlobular septal thickening, with an atypical presentation of right worse than left. Findings are nonspecific and may be reflective of edema with superimposed infection. There is a new 5 mm pulmonary nodule within the right middle lobe (series 402, image 158). Other scattered pulmonary nodules, predominantly within the right lobe, which are stable in size. Moderate centrilobular predominant emphysema   UPPER ABDOMEN: Partially visualized cholelithiasis similar in appearance compared to prior examination; however, please refer to same-day CT abdomen and pelvis for further characterization. Bilateral hypodense lesions within the renal parenchyma which are consistent with benign cysts.   CHEST WALL AND OSSEOUS STRUCTURES: Chest wall is within normal limits. No acute osseous pathology.There are no suspicious osseous lesions.       1. No evidence of acute pulmonary embolism. 2. Diffuse bilateral interlobular septal thickening with an atypical presentation of right-greater-than-left. In part, findings may be secondary to low lung volumes; however, pulmonary edema and/or superimposed infection cannot be excluded. 3. Moderate centrilobular predominant emphysema. 4. Interval development of a 5 mm pulmonary nodule within the right middle lobe. Recommend follow-up CT of the chest in 6 months to evaluate for stability 5. Other non-acute, chronic findings as described above.   I personally reviewed the images/study and I agree with the findings as stated by Kaz Sousa DO PGY-2. This  study was interpreted at [place], [place].   MACRO: None     Dictation workstation:   [id number]    CT abdomen pelvis w IV contrast    Result Date: 12/11/2024  Interpreted By:  Jyoti Malcolm, STUDY: CT ABDOMEN PELVIS W IV CONTRAST;  12/11/2024 1:31 pm   INDICATION: Signs/Symptoms:sepsis, recent appy.     COMPARISON: CT chest 06/27/2023.   ACCESSION NUMBER(S): [id number]   ORDERING CLINICIAN: AWILDA JAIME   TECHNIQUE: CT of the abdomen and pelvis was performed.  Standard contiguous axial images were obtained at 3 mm slice thickness through the abdomen and pelvis. Coronal and sagittal reconstructions at 3 mm slice thickness were performed.  90 ML of Omnipaque 350 was administered intravenously without immediate complication.   FINDINGS: LOWER CHEST: New ground-glass opacities in the right-greater-than-left posterior bilateral lower lobes when compared to CT chest 06/27/2023. However recommend looking at same day CTA chest for PE for full examination findings.   No pneumothorax or pleural effusion.   The heart is mildly enlarged in size with trace pericardial effusion. Moderate coronary artery calcifications.   Visualized esophagus appears normal.   ABDOMEN:   LIVER: The liver is normal in size. There is a too small to characterize hypodense lesion in the left hepatic lobe this is statistically favored represent a cyst. Mild hepatic steatosis.   BILE DUCTS: The intrahepatic and extrahepatic ducts are not dilated.   GALLBLADDER: There is a large radiopaque stone within the neck of the gallbladder. There is mild gallbladder wall thickening.   PANCREAS: Unremarkable   SPLEEN: The spleen is normal in size and unremarkable.   ADRENAL GLANDS: Bilateral adrenal glands appear normal.   KIDNEYS AND URETERS: The kidneys are normal in size. There are patchy areas of hypoenhancement in bilateral renal cortices and mild bilateral perinephric edema. There are multiple bilateral  cystic lesions in the kidneys, the largest on the right in the superior pole measuring 2.7 cm and with a partially calcified rim, and the largest on the left in the superior pole measuring 2.8 cm. No hydroureteronephrosis or nephroureterolithiasis is identified.   PELVIS:   BLADDER: Unremarkable.   REPRODUCTIVE ORGANS: The uterus is present. There are multiple calcified lesions in the uterus likely represent calcified leiomyomas.   BOWEL: The stomach is unremarkable.   The small and large bowel are normal in caliber and demonstrate no wall thickening. Sigmoidal colonic diverticulosis without evidence of diverticulitis.    The appendix is surgically absent.   VESSELS: There is no aneurysmal dilatation of the abdominal aorta. Moderate atherosclerotic disease of the abdominal aorta and its branches. The IVC appears normal.   PERITONEUM/RETROPERITONEUM/LYMPH NODES: No ascites or free air, no fluid collection.  No abdominopelvic lymphadenopathy is present.   BONES AND ABDOMINAL WALL: No suspicious osseous lesions are identified. Mild multilevel degenerative discogenic disease is noted in the lower thoracic and lumbar spine.  The abdominal wall soft tissues appear normal.       1.  Patchy areas of hypoenhancement in the bilateral renal cortices mild bilateral perinephric edema suggestive of pyelonephritis. There is no surrounding fluid collection or obstructive nephroureterolithiasis. 2. Incidental findings include mild hepatic steatosis, nonspecific mild gallbladder wall thickening, and multiple calcified leiomyomas in the uterus. 3. Mild cardiomegaly with trace pericardial effusion.   I personally reviewed the images/study and I agree with the findings as stated by Resident Dr. Jyoti Malcolm MD. This study was interpreted at [place], [place].   MACRO: None     Dictation workstation:   [id number]    XR chest 1 view    Result Date: 12/11/2024  Interpreted By:  Gregorio  Idris, STUDY: XR CHEST 1 VIEW;  12/11/2024 10:40 am   INDICATION: Signs/Symptoms:sepsis.     COMPARISON: None.   ACCESSION NUMBER(S): [id number]   ORDERING CLINICIAN: LEWIS GALLOWAY   FINDINGS:         CARDIOMEDIASTINAL SILHOUETTE: Cardiomediastinal silhouette is normal in size and configuration.   LUNGS: Increased lung markings bilaterally in a perihilar and biapical and distribution. No effusion seen.   ABDOMEN: No remarkable upper abdominal findings.   BONES: No acute osseous changes.       1. Increased lung markings bilaterally. Atypical/viral pneumonia is highly differential. A component of mild edema may be present as well       MACRO: None   Signed by: Idris Perkins 12/11/2024 10:59 AM Dictation workstation:   [id number]      Scheduled medications  [Held by provider] amLODIPine, 5 mg, oral, Daily  [START ON 12/12/2024] azithromycin, 500 mg, oral, q24h DOUGLAS  [Held by provider] hydroCHLOROthiazide, 25 mg, oral, Daily  [Held by provider] lisinopril, 20 mg, oral, Daily  piperacillin-tazobactam, 3.375 g, intravenous, q6h      Continuous medications     PRN medications        Assessment/Plan   Megan Holt is a 74 y.o. female with a PMHx of pulmonary nodules, tobacco use, HTN, R breast cancer (s/p mastectomy 1993, s/p tamoxifen for 5 years), and recent appendicitis (s/p appendectomy 10/4/24) presenting with urinary incontinence, cough, and shortness of breath.    Imaging demonstrates likely underlying pneumonia and pyelonephritis. Labs also showing JAYDE and microcytic anemia. Patient's history of diarrhea also possibly due to an infectious etiology. Vitals showing improvement following IV fluids and antibiotics. Will continue antibiotics and monitor urine output while awaiting infectious workup. Hemolysis labs ordered for anemia.     #Pneumonia  #C/f sepsis  ::1 week history of non-productive cough  ::Chest x-ray: increased lung markings bilaterally  ::CT angio chest: diffuse bilateral interlobular septal  thickening with asymmetric prominence towards right   ::Elevated WBC of 20.2 on admission (neutrophil predominant)  ::Flu/Covid/RSV negative  ::Lactate 6.8>0.4 following IV fluid bolus  -Sputum, blood, and urine cultures ordered  -Strep pneumo and legionella tests pending   -Currently on vancomycin, Zosyn, and azithromycin  -MRSA pending  -PRN guaifenesin    #Pyelonephritis  ::CT A/P w/ contrast: Patchy areas of hypoenhancement in the bilateral renal cortices mild bilateral perinephric edema suggestive of pyelonephritis   ::UA positive for leukocyte esterase  -Urine cultures pending  -Currently on vancomycin, Zosyn, and azithromycin    #Microcytic anemia  ::Hgb 6.3, MCV 66 on admission  ::Patient not reporting recent history of bleeds. However, states that she hasn't recently taken her iron supplement  ::Patient reporting no recent colonoscopy  ::  -1 unit pRBCs ordered  -Post transfusion CBC ordered  -Hemolysis labs ordered    #Diarrhea  ::1 week hx of diarrhea  ::S/p 2.25L IV fluids in ED   -Stool pathogen panel and C diff pending    #JAYDE  ::Cr 1.79 (vs baseline Cr 1.0)  ::S/p 2.25L IV fluids in ED   ::Patient required IV contrast for workup of sepsis in ED  -Daily RFPs    #Hypokalemia  ::K 2.9 on admission  -Given oral potassium chloride in ED  -Daily RFPs    #Elevated troponin  ::Troponin 184>603>105  ::EKG showed sinus tachycardia  ::  ::Likely related to demand ischemia iso hypotension    #HTN  ::Home meds: amlodipine 5 mg daily, lisinopril 20 mg daily, hydrochlorothiazide 25 mg daily  -Holding home antihypertensives due to drop in blood pressure while in ED    #Pulmonary nodules  ::Patient has a history of pulmonary nodules  ::New 5 mm nodule noted in R lung  -Repeat CT chest in 6 months recommended    F: PRN  E: PRN  N: Regular diet  A: PIV  DVT ppx: SCDs  Bowel ppx: Miralax    Code Status: Full Code  Emergency contact: LEODAN ALAMO (Daughter) [phone]       Queen GERRY Treviño MD     ,

## 2024-12-11 NOTE — PROGRESS NOTES
"Pharmacy Medication History Review    Megan Holt is a 74 y.o. female admitted for Community acquired pneumonia, unspecified laterality. Pharmacy reviewed the patient's [id number] medications and allergies for accuracy.    Medications ADDED:  Ibuprofen 800 mg  Multivitamin   Medications CHANGED:  None   Medications REMOVED:   None     The list below reflects the updated PTA list.   Prior to Admission Medications   Prescriptions Last Dose Informant   MULTIVITAMIN ORAL 12/8/2024 Self   Sig: Take 1 tablet by mouth once daily.   amLODIPine (Norvasc) 5 mg tablet 12/10/2024 Self   Sig: Take 1 tablet (5 mg) by mouth once daily.   hydroCHLOROthiazide (HYDRODiuril) 25 mg tablet Unknown Self   Sig: Take 1 tablet (25 mg) by mouth once daily.   ibuprofen 800 mg tablet Unknown    Sig: Take 1 tablet (800 mg) by mouth every 8 hours if needed for mild pain (1 - 3).   lisinopril 20 mg tablet 12/10/2024 Self   Sig: Take 1 tablet (20 mg) by mouth once daily.      Facility-Administered Medications: None         The list below reflects the updated allergy list. Please review each documented allergy for additional clarification and justification.  Allergies  Reviewed by Ramírez العراقي on 12/11/2024   No Known Allergies         Patient accepts M2B at discharge.     Sources:   [facility]  Pharmacy dispense history  Patient interview Moderate historian     Additional Comments:  Patient mentioned \"a pill with 2 names\" when prompted about hydrochlorothiazide, unable to confirm if she's currently taking it and when her last dose was.       RAMÍREZ العراقي  Pharmacy Technician  12/11/24     Secure Chat preferred   If no response call LastRoom or MailMag \"Med Rec\"    "

## 2024-12-11 NOTE — H&P
74-year-old female with past medical history of remote breast cancer status post right breast mastectomy, hypertension, and recent appendectomy on 10/4 status post appendicitis with perforation who presents to the ED with flulike symptoms that began yesterday, fever, hypotension, tachycardia, hypoxemia /sepsis. Patient was having 1 week of diarrhea and severe fatigue and decreased oral intake and low appetite .  Resuscitated with iv fluids, anti-pyretics.  Given empirically Zosyn and azithromycin  Hb 6, ordered 1 unit of blood for transfusion.  Electrolytes corrected.    Work-up:  -ct abdomen-pelvis: 1.  Patchy areas of hypoenhancement in the bilateral renal cortices  mild bilateral perinephric edema suggestive of pyelonephritis. There  is no surrounding fluid collection or obstructive  nephroureterolithiasis.  2. Incidental findings include mild hepatic steatosis, nonspecific  mild gallbladder wall thickening, and multiple calcified leiomyomas  in the uterus.  3. Mild cardiomegaly with trace pericardial effusion    CT angio chest: 1. No evidence of acute pulmonary embolism.  2. Diffuse bilateral interlobular septal thickening with an atypical  presentation of right-greater-than-left. In part, findings may be  secondary to low lung volumes; however, pulmonary edema and/or  superimposed infection cannot be excluded.  3. Moderate centrilobular predominant emphysema.  4. Interval development of a 5 mm pulmonary nodule within the right  middle lobe. Recommend follow-up CT of the chest in 6 months to  evaluate for stability  5. Other non-acute, chronic findings as described above.      Results for orders placed or performed during the hospital encounter of 12/11/24 (from the past 24 hours)   CBC and Auto Differential   Result Value Ref Range    WBC 20.2 (H) 4.4 - 11.3 x10*3/uL    nRBC 0.8 (H) 0.0 - 0.0 /100 WBCs    RBC 3.04 (L) 4.00 - 5.20 x10*6/uL    Hemoglobin 6.3 (LL) 12.0 - 16.0 g/dL    Hematocrit 20.1 (L) 36.0 -  46.0 %    MCV 66 (L) 80 - 100 fL    MCH 20.7 (L) 26.0 - 34.0 pg    MCHC 31.3 (L) 32.0 - 36.0 g/dL    RDW 17.4 (H) 11.5 - 14.5 %    Platelets 367 150 - 450 x10*3/uL    Neutrophils % 92.1 40.0 - 80.0 %    Immature Granulocytes %, Automated 1.1 (H) 0.0 - 0.9 %    Lymphocytes % 3.9 13.0 - 44.0 %    Monocytes % 2.9 2.0 - 10.0 %    Eosinophils % 0.0 0.0 - 6.0 %    Basophils % 0.0 0.0 - 2.0 %    Neutrophils Absolute 18.62 (H) 1.60 - 5.50 x10*3/uL    Immature Granulocytes Absolute, Automated 0.22 0.00 - 0.50 x10*3/uL    Lymphocytes Absolute 0.78 (L) 0.80 - 3.00 x10*3/uL    Monocytes Absolute 0.59 0.05 - 0.80 x10*3/uL    Eosinophils Absolute 0.00 0.00 - 0.40 x10*3/uL    Basophils Absolute 0.01 0.00 - 0.10 x10*3/uL   Comprehensive Metabolic Panel   Result Value Ref Range    Glucose 174 (H) 74 - 99 mg/dL    Sodium 136 136 - 145 mmol/L    Potassium 2.9 (LL) 3.5 - 5.3 mmol/L    Chloride 100 98 - 107 mmol/L    Bicarbonate 16 (L) 21 - 32 mmol/L    Anion Gap 23 (H) 10 - 20 mmol/L    Urea Nitrogen 43 (H) 6 - 23 mg/dL    Creatinine 1.79 (H) 0.50 - 1.05 mg/dL    eGFR 29 (L) >60 mL/min/1.73m*2    Calcium 9.5 8.6 - 10.6 mg/dL    Albumin 3.6 3.4 - 5.0 g/dL    Alkaline Phosphatase 78 33 - 136 U/L    Total Protein 8.7 (H) 6.4 - 8.2 g/dL    AST 40 (H) 9 - 39 U/L    Bilirubin, Total 0.5 0.0 - 1.2 mg/dL    ALT 13 7 - 45 U/L   Blood Culture    Specimen: Peripheral Venipuncture; Blood culture   Result Value Ref Range    Blood Culture Loaded on Instrument - Culture in progress    Blood Culture    Specimen: Peripheral Venipuncture; Blood culture   Result Value Ref Range    Blood Culture Loaded on Instrument - Culture in progress    Magnesium   Result Value Ref Range    Magnesium 1.95 1.60 - 2.40 mg/dL   B-type natriuretic peptide   Result Value Ref Range     (H) 0 - 99 pg/mL   Troponin I, High Sensitivity, Initial   Result Value Ref Range    Troponin I, High Sensitivity (CMC) 184 (HH) 0 - 34 ng/L   Sars-CoV-2 and Influenza A/B PCR   Result  Value Ref Range    Flu A Result Not Detected Not Detected    Flu B Result Not Detected Not Detected    Coronavirus 2019, PCR Not Detected Not Detected   RSV PCR   Result Value Ref Range    RSV PCR Not Detected Not Detected   Lactate   Result Value Ref Range    Lactate 6.8 (HH) 0.4 - 2.0 mmol/L   Type and Screen   Result Value Ref Range    ABO TYPE O     Rh TYPE POS     ANTIBODY SCREEN NEG    Troponin, High Sensitivity, 1 Hour   Result Value Ref Range    Troponin I, High Sensitivity (CMC) 603 (HH) 0 - 34 ng/L   Light Blue Top   Result Value Ref Range    Extra Tube Hold for add-ons.    Lactate   Result Value Ref Range    Lactate 0.4 0.4 - 2.0 mmol/L   Urinalysis with Reflex Culture and Microscopic   Result Value Ref Range    Color, Urine Light-Yellow Light-Yellow, Yellow, Dark-Yellow    Appearance, Urine Clear Clear    Specific Gravity, Urine 1.021 1.005 - 1.035    pH, Urine 5.5 5.0, 5.5, 6.0, 6.5, 7.0, 7.5, 8.0    Protein, Urine 70 (1+) (A) NEGATIVE, 10 (TRACE), 20 (TRACE) mg/dL    Glucose, Urine Normal Normal mg/dL    Blood, Urine 0.06 (1+) (A) NEGATIVE    Ketones, Urine NEGATIVE NEGATIVE mg/dL    Bilirubin, Urine NEGATIVE NEGATIVE    Urobilinogen, Urine Normal Normal mg/dL    Nitrite, Urine NEGATIVE NEGATIVE    Leukocyte Esterase, Urine 250 Terese/µL (A) NEGATIVE   Microscopic Only, Urine   Result Value Ref Range    WBC, Urine 21-50 (A) 1-5, NONE /HPF    RBC, Urine 3-5 NONE, 1-2, 3-5 /HPF    Squamous Epithelial Cells, Urine 1-9 (SPARSE) Reference range not established. /HPF    Mucus, Urine FEW Reference range not established. /LPF   Troponin I, High Sensitivity   Result Value Ref Range    Troponin I, High Sensitivity (CMC) 105 (H) 0 - 34 ng/L      A/P: 74 y old lady, previous history of HTN, breast cancer, in 1993, Presenting with severe sepsis secondary to pneumonia, severe diarrhea  and pyelonephritis. Patient sick for 1 week.  improved with IV fluids and antibiotics.  Lactic acid improved from 6.8 to 0.4  Blood  pressure improved. She improved hemodynamically and clinically.    Plan:  -keep resuscitation depending on BP and urine output.  -keep on broad spectrum antibiotics: zosyn and azithromycin.  -pending : blood culture, urine culture, legionella, strep antigens, sputum culture  -stool studies and c.diff  -monitor urine urine and follow-up crea (patient has JAYDE: crea 1.79, from a normal baseline crea, and she took Iv contrast for ct scan)  -check anemia work-up including hemolysis done before transfusion. Follow-up on cbc and if any focus of bleed (no evidence of bleed clinically)

## 2024-12-11 NOTE — HOSPITAL COURSE
75 yo woman with a PMHx of pulmonary nodules, tobacco use, HTN, R breast cancer (s/p mastectomy 1993, s/p tamoxifen for 5 years), and recent appendicitis (s/p appendectomy 10/4/24) presenting on 12/11/24 with urinary incontinence, cough, and shortness of breath.    Per EMS, patient was hypoxic to the low 80s on room air and was placed on 4L O2 with improvement to the mid 90s, as well as a HR in the 150s-160s. In the ED, the patient had vitals of T 39.5 C, , RR 22, /70, SpO2 93% on 4L O2. Labs were notable for WBC 20.2, hgb 6.3, MCV 66, K 2.8, bicarb 16, BUN 43, Cr 1.79 (vs baseline Cr 1.0), glucose 174, , trop 184>603>105, lactate 6.8.     The patient was started on 2.25L NS, PO potassium chloride, Tylenol, vancomycin and Zosyn. Home antihypertensives were held due to concern for sepsis. Repeat lactate was 0.4. Blood cultures were collected. Covid, RSV and flu panels were negative.     UA was positive for leukocyte esterase and WBCs. EKG showed sinus tachycardia. Chest x-ray showed increased lung markings bilaterally suggestive of possible atypical/viral pneumonia. CT angio chest showed no evidence of PEs, moderate centrilobular pulmonary edema, a new 5 mm pulmonary nodule, and diffuse bilateral interlobular septal thickening. CT A/P showed evidence of pyelonephritis and calcified uterine leiomyomas. Azithromycin was added to antibiotic course.    Blood cultures were positive for E. Coli sensitive to Bactrim, meropenem, ertapenem, and amikacin. Resistant to all other tested antibiotics. IV Zosyn was switched to IV meropenem. Stool ova and parasite test ordered due to continued diarrhea.     Issues to Follow Up:  -Severe microcytic anemia of unknown cause. Recommend colonoscopy as patient had a positive Cologuard in 10/2020  -New 5 mm pulmonary nodule seen in R lung on imaging 12/11/24. Recommend CT chest in 6 months

## 2024-12-11 NOTE — ED TRIAGE NOTES
Pt presents to the ED via [facility] c/o flu sx, tachycardia, and HTN. Pt states she has been feeling weak and SOB for a few days now. Pt has an appendectomy about 6 weeks ago.

## 2024-12-11 NOTE — ED PROVIDER NOTES
"This is a 74-year-old female with past medical history of remote breast cancer status post right breast mastectomy, hypertension, and recent appendectomy on 10/4 status post appendicitis with perforation who presents to the ED with flulike symptoms that began yesterday.  Per EMS patient called because she was feeling unwell and got worse today.  Per EMS she was hypoxic to the low 80s on room air and was placed on 4 L with improvement to the mid 90s.  She was also tachycardic with a heart rate in the 150s to 160s.  She was hypertensive for EMS.  Patient also reportedly had an episode of urinary incontinence.  History from the patient is very limited.  She does endorse a cough as well as shortness of breath.  She denies abdominal pain.  She denies urinary symptoms.  She does have that she feels very fatigued.  Patient is febrile on arrival.      History provided by:  Patient, medical records and EMS personnel  History limited by:  Acuity of condition   used: No             Visit Vitals  /58 (BP Location: Left arm, Patient Position: Lying)   Pulse 97   Temp 37.4 °C (99.4 °F) (Oral)   Resp 19   Ht 1.6 m (5' 3\")   Wt 71.7 kg (158 lb)   LMP  (LMP Unknown)   SpO2 98%   BMI 27.99 kg/m²   OB Status Postmenopausal   Smoking Status Every Day   BSA 1.79 m²          Physical Exam     Physical exam:   General: Vitals noted, no distress. +febrile.  Lethargic.  Alert and oriented x 3.  EENT:  Hearing grossly intact. Normal phonation. MMM. Airway patient. PERRL. EOMI.   Neck: No midline tenderness or paraspinal tenderness. FROM.   Cardiac: Tachycardic, regular rhythm normal S1 and S2.  No murmurs, gallops, rubs.   Pulmonary: Good air exchange.  Diminished breath sounds bilaterally.  No increased work of breathing. No accessory muscle use.   Abdomen: Soft, nonsurgical. Nontender. No peritoneal signs. Normoactive bowel sounds.   Back: No CVA tenderness. No midline tenderness or paraspinal tenderness. No " obvious deformity.   Extremities: No peripheral edema.  Full range of motion. Moves all extremities freely. No tenderness throughout extremities.   Skin: No rash. Warm and Dry.   Neuro: No focal neurologic deficits. CN 2-12 grossly intact. Sensation equal bilaterally. No weakness.         Labs Reviewed   CBC WITH AUTO DIFFERENTIAL - Abnormal       Result Value    WBC 20.2 (*)     nRBC 0.8 (*)     RBC 3.04 (*)     Hemoglobin 6.3 (*)     Hematocrit 20.1 (*)     MCV 66 (*)     MCH 20.7 (*)     MCHC 31.3 (*)     RDW 17.4 (*)     Platelets 367      Neutrophils % 92.1      Immature Granulocytes %, Automated 1.1 (*)     Lymphocytes % 3.9      Monocytes % 2.9      Eosinophils % 0.0      Basophils % 0.0      Neutrophils Absolute 18.62 (*)     Immature Granulocytes Absolute, Automated 0.22      Lymphocytes Absolute 0.78 (*)     Monocytes Absolute 0.59      Eosinophils Absolute 0.00      Basophils Absolute 0.01     COMPREHENSIVE METABOLIC PANEL - Abnormal    Glucose 174 (*)     Sodium 136      Potassium 2.9 (*)     Chloride 100      Bicarbonate 16 (*)     Anion Gap 23 (*)     Urea Nitrogen 43 (*)     Creatinine 1.79 (*)     eGFR 29 (*)     Calcium 9.5      Albumin 3.6      Alkaline Phosphatase 78      Total Protein 8.7 (*)     AST 40 (*)     Bilirubin, Total 0.5      ALT 13     URINALYSIS WITH REFLEX CULTURE AND MICROSCOPIC - Abnormal    Color, Urine Light-Yellow      Appearance, Urine Clear      Specific Gravity, Urine 1.021      pH, Urine 5.5      Protein, Urine 70 (1+) (*)     Glucose, Urine Normal      Blood, Urine 0.06 (1+) (*)     Ketones, Urine NEGATIVE      Bilirubin, Urine NEGATIVE      Urobilinogen, Urine Normal      Nitrite, Urine NEGATIVE      Leukocyte Esterase, Urine 250 Terese/µL (*)    B-TYPE NATRIURETIC PEPTIDE - Abnormal     (*)     Narrative:        <100 pg/mL - Heart failure unlikely  100-299 pg/mL - Intermediate probability of acute heart                  failure exacerbation. Correlate with clinical                   context and patient history.    >=300 pg/mL - Heart Failure likely. Correlate with clinical                  context and patient history.     Biotin interference may cause falsely decreased results. Patients taking a Biotin dose of up to 5 mg/day should refrain from taking Biotin for 24 hours before sample  collection. Providers may contact their local laboratory for further information.   SERIAL TROPONIN-INITIAL - Abnormal    Troponin I, High Sensitivity (CMC) 184 (*)     Narrative:     Less than 99th percentile of normal range cutoff-  Female and children under 18 years old <35 ng/L; Male <54 ng/L: Negative  Repeat testing should be performed if clinically indicated.     Female and children under 18 years old  ng/L; Male  ng/L:  Consistent with possible cardiac damage and possible increased clinical   risk. Serial measurements may help to assess extent of myocardial damage.     >120 ng/L: Consistent with cardiac damage, increased clinical risk and  myocardial infarction. Serial measurements may help assess extent of   myocardial damage.      NOTE: Children less than 1 year old may have higher baseline troponin   levels and results should be interpreted in conjunction with the overall   clinical context.    NOTE: Troponin I testing is performed using a different   testing methodology at [facility] than at other   [facility]. Direct result comparisons should only   be made within the same method.     SERIAL TROPONIN, 1 HOUR - Abnormal    Troponin I, High Sensitivity (CMC) 603 (*)     Narrative:     Less than 99th percentile of normal range cutoff-  Female and children under 18 years old <35 ng/L; Male <54 ng/L: Negative  Repeat testing should be performed if clinically indicated.     Female and children under 18 years old  ng/L; Male  ng/L:  Consistent with possible cardiac damage and possible increased clinical   risk. Serial measurements may help to assess  extent of myocardial damage.     >120 ng/L: Consistent with cardiac damage, increased clinical risk and  myocardial infarction. Serial measurements may help assess extent of   myocardial damage.      NOTE: Children less than 1 year old may have higher baseline troponin   levels and results should be interpreted in conjunction with the overall   clinical context.    NOTE: Troponin I testing is performed using a different   testing methodology at [facility] than at other   [facility]. Direct result comparisons should only   be made within the same method.     LACTATE - Abnormal    Lactate 6.8 (*)     Narrative:     Venipuncture immediately after or during the administration of Metamizole may lead to falsely low results. Testing should be performed immediately prior to Metamizole dosing.   TROPONIN I, HIGH SENSITIVITY - Abnormal    Troponin I, High Sensitivity (CMC) 105 (*)     Narrative:     Less than 99th percentile of normal range cutoff-  Female and children under 18 years old <35 ng/L; Male <54 ng/L: Negative  Repeat testing should be performed if clinically indicated.     Female and children under 18 years old  ng/L; Male  ng/L:  Consistent with possible cardiac damage and possible increased clinical   risk. Serial measurements may help to assess extent of myocardial damage.     >120 ng/L: Consistent with cardiac damage, increased clinical risk and  myocardial infarction. Serial measurements may help assess extent of   myocardial damage.      NOTE: Children less than 1 year old may have higher baseline troponin   levels and results should be interpreted in conjunction with the overall   clinical context.    NOTE: Troponin I testing is performed using a different   testing methodology at [facility] than at other   [facility]. Direct result comparisons should only   be made within the same method.     MICROSCOPIC ONLY, URINE - Abnormal    WBC, Urine 21-50 (*)      RBC, Urine 3-5      Squamous Epithelial Cells, Urine 1-9 (SPARSE)      Mucus, Urine FEW     BLOOD CULTURE - Normal    Blood Culture Loaded on Instrument - Culture in progress     BLOOD CULTURE - Normal    Blood Culture Loaded on Instrument - Culture in progress     MAGNESIUM - Normal    Magnesium 1.95     SARS-COV-2 AND INFLUENZA A/B PCR - Normal    Flu A Result Not Detected      Flu B Result Not Detected      Coronavirus 2019, PCR Not Detected      Narrative:     This assay has received FDA Emergency Use Authorization (EUA) and  is only authorized for the duration of time that circumstances exist to justify the authorization of the emergency use of in vitro diagnostic tests for the detection of SARS-CoV-2 virus and/or diagnosis of COVID-19 infection under section 564(b)(1) of the Act, 21 U.S.C. 360bbb-3(b)(1). Testing for SARS-CoV-2 is only recommended for patients who meet current clinical and/or epidemiological criteria as defined by federal, state, or local public health directives. This assay is an in vitro diagnostic nucleic acid amplification test for the qualitative detection of SARS-CoV-2, Influenza A, and Influenza B from nasopharyngeal specimens and has been validated for use at [facility]. Negative results do not preclude COVID-19 infections or Influenza A/B infections, and should not be used as the sole basis for diagnosis, treatment, or other management decisions. If Influenza A/B and RSV PCR results are negative, testing for Parainfluenza virus, Adenovirus and Metapneumovirus is routinely performed for [facility] pediatric oncology and intensive care inpatients, and is available on other patients by placing an add-on request.    RSV PCR - Normal    RSV PCR Not Detected      Narrative:     This assay is an FDA-cleared, in vitro diagnostic nucleic acid amplification test for the detection of RSV from nasopharyngeal specimens, and has been validated for use at [facility]. Negative results do not preclude RSV infections, and should not be used as the sole basis for diagnosis, treatment, or other management decisions. If Influenza A/B and RSV PCR results are negative, testing for Parainfluenza virus, Adenovirus and Metapneumovirus is routinely performed for pediatric oncology and intensive care inpatients at [facility], and is available on other patients by placing an add-on request.       LACTATE - Normal    Lactate 0.4      Narrative:     Venipuncture immediately after or during the administration of Metamizole may lead to falsely low results. Testing should be performed immediately prior to Metamizole dosing.   URINE CULTURE   TROPONIN SERIES- (INITIAL, 1 HR)    Narrative:     The following orders were created for panel order Troponin Series, (0, 1 HR).  Procedure                               Abnormality         Status                     ---------                               -----------         ------                     Troponin I, High Sensiti...[[id number]]  Abnormal            Final result               Troponin, High Sensitivi...[[id number]]  Abnormal            Final result                 Please view results for these tests on the individual orders.   TYPE AND SCREEN    ABO TYPE O      Rh TYPE POS      ANTIBODY SCREEN NEG     URINALYSIS WITH REFLEX CULTURE AND MICROSCOPIC    Narrative:     The following orders were created for panel order Urinalysis with Reflex Culture and Microscopic.  Procedure                               Abnormality         Status                     ---------                               -----------         ------                     Urinalysis with Reflex C...[[id number]]  Abnormal            Final result               Extra Urine Gray Tube[[id number]]                            In process                   Please view results for these tests on the individual orders.   BLOOD GAS VENOUS FULL PANEL   EXTRA URINE GRAY TUBE   PREPARE RBC       CT angio  chest for pulmonary embolism   Preliminary Result   1. No evidence of acute pulmonary embolism.   2. Diffuse bilateral interlobular septal thickening with an atypical   presentation of right-greater-than-left. In part, findings may be   secondary to low lung volumes; however, pulmonary edema and/or   superimposed infection cannot be excluded.   3. Moderate centrilobular predominant emphysema.   4. Interval development of a 5 mm pulmonary nodule within the right   middle lobe. Recommend follow-up CT of the chest in 6 months to   evaluate for stability   5. Other non-acute, chronic findings as described above.        I personally reviewed the images/study and I agree with the findings   as stated by Kaz Sousa DO PGY-2. This study was interpreted at   [place], [place].        MACRO:   None             Dictation workstation:   [id number]      CT abdomen pelvis w IV contrast   Preliminary Result   1.  Patchy areas of hypoenhancement in the bilateral renal cortices   mild bilateral perinephric edema suggestive of pyelonephritis. There   is no surrounding fluid collection or obstructive   nephroureterolithiasis.   2. Incidental findings include mild hepatic steatosis, nonspecific   mild gallbladder wall thickening, and multiple calcified leiomyomas   in the uterus.   3. Mild cardiomegaly with trace pericardial effusion.        I personally reviewed the images/study and I agree with the findings   as stated by Resident Dr. Jyoti Malcolm MD. This study was   interpreted at [place], [place].        MACRO:   None             Dictation workstation:   [id number]      XR chest 1 view   Final Result   1. Increased lung markings bilaterally. Atypical/viral pneumonia is   highly differential. A component of mild edema may be present as well                  MACRO:   None        Signed by: Idris Perkins 12/11/2024 10:59 AM    Dictation workstation:   [id number]      Point of Care Ultrasound    (Results Pending)           ED Course & MDM     Medical Decision Making  This is a 74-year-old female with past medical history of remote breast cancer as well as hypertension and recent appendectomy who presents to the ED for flulike symptoms including generalized malaise, cough as well as urinary incontinence.  On arrival patient was brought to the resuscitation room due to her tachycardia.  Heart rate on arrival was 145, SpO2 at 93% on 4 L, temperature elevated at 103.1 °F.  On physical examination patient tachycardic.  Breath sounds present but diminished bilaterally.  Abdomen soft with no reproducible tenderness to deep palpation.  Neuro examination nonfocal.  Due to concern for sepsis IV established and laboratory studies obtained including cultures.  VBG obtained and showed pH elevated 7.50, potassium low 2.8, hemoglobin low at 6.4.  Lactate elevated at 5.4.  Glucose 174.  Patient started on IV fluids as well as vancomycin and Zosyn.  She was ordered Tylenol for her fever.  Chest x-ray obtained.  EKG obtained and was sinus tachycardia.  Chest x-ray obtained and was concerning for possible pneumonia.  Azithromycin also ordered for this patient.  Patient continued to be tachycardic and was ordered another 1250 cc of IV fluids with a total of 2250 for a 30 cc/kg total due to concern for sepsis.  Patient's heart rate following this did normalize.  Patient's laboratory studies did confirm that patient is anemic with a hemoglobin of 6.3.  She was consented for a unit of blood.  Elevated WBC at 20.2 consistent with concern for sepsis.  Initial troponin 184, this was repeated and up trended to 603.  She is ordered 324 of aspirin.  EKG repeated and showed no acute ischemic changes.  Third troponin obtained and downtrending now to 105.  UA was concerning for infection.  Viral swabs negative.  CMP did show an JAYDE with a creatinine of 1.79.   Potassium 2.9.  She was ordered oral potassium replacement.  Following the antibiotics and IV fluids lactate did downtrend to 0.4.  On reassessment patient was awake, alert, and oriented x 4 and appeared to be feeling greatly improved.  Her fever had resolved.  CT PE study as well as CT abdomen pelvis obtained.  CT PE study showed no evidence of PE but was concerning for bilateral pneumonia with the right greater than left as well as bilateral pyelonephritis.  The patient and her family member were informed of these results.  Patient was accepted to medicine for admission due to her sepsis secondary to pneumonia and pyelonephritis, her JAYDE, her elevated troponin level, her hypokalemia, and her anemia.  She was signed out to oncoming team pending her receiving the 1 unit of blood that was ordered.    Amount and/or Complexity of Data Reviewed  Labs: ordered.  Radiology: ordered and independent interpretation performed.     Details: Chest x-ray concerning for bilateral pneumonia  ECG/medicine tests: ordered. Decision-making details documented in ED Course.    Risk  Decision regarding hospitalization.         I performed a sepsis reperfusion exam on Megan Holt on 12/11/2024NOWNOREF@    A targeted fluid bolus of 2250 was given    Silvina Wetzel PA-C     ED Course as of 12/11/24 1535   Wed Dec 11, 2024   1028 EKG independently interpreted by myself: sinus tachycardia, , normal intervals, normal axis, no ST elevations [FF]   1214 HEMOGLOBIN(!!): 6.3  Patient's hemoglobin found to be low at 6.3.  She denied any bleeding, specifically denied any recent GI bleeding.  Patient consented for 1 unit of blood. [AW]   1407 EKG independently interpreted by myself: NSR, HR 99, normal axis, prolonged QT with QTc 539, other intervals normal, no ST elevation [FF]      ED Course User Index  [AW] Silvina Wetzel PA-C  [FF] Clem Berrios MD         Diagnoses as of 12/11/24 1535   Community acquired pneumonia, unspecified  laterality   Pyelonephritis   Elevated troponin   JAYDE (acute kidney injury) (CMS-[facility])   Hypokalemia   Iron deficiency anemia, unspecified iron deficiency anemia type       This was a shared visit with an ED attending.  The patient was seen and discussed with the ED attending    Procedures    RACHEL Guerrero, PA-C     Silvina Wetzel PA-C  12/11/24 153

## 2024-12-12 LAB
ALBUMIN SERPL BCP-MCNC: 3 G/DL (ref 3.4–5)
ALBUMIN SERPL BCP-MCNC: 3.1 G/DL (ref 3.4–5)
ALP SERPL-CCNC: 59 U/L (ref 33–136)
ALT SERPL W P-5'-P-CCNC: 12 U/L (ref 7–45)
ANION GAP SERPL CALC-SCNC: 12 MMOL/L (ref 10–20)
ANION GAP SERPL CALC-SCNC: 15 MMOL/L (ref 10–20)
AST SERPL W P-5'-P-CCNC: 30 U/L (ref 9–39)
BACTERIA UR CULT: NORMAL
BASOPHILS # BLD AUTO: 0.04 X10*3/UL (ref 0–0.1)
BASOPHILS # BLD AUTO: 0.04 X10*3/UL (ref 0–0.1)
BASOPHILS NFR BLD AUTO: 0.2 %
BASOPHILS NFR BLD AUTO: 0.2 %
BILIRUB SERPL-MCNC: 0.5 MG/DL (ref 0–1.2)
BUN SERPL-MCNC: 29 MG/DL (ref 6–23)
BUN SERPL-MCNC: 41 MG/DL (ref 6–23)
C COLI+JEJ+UPSA DNA STL QL NAA+PROBE: NOT DETECTED
C DIF TOX TCDA+TCDB STL QL NAA+PROBE: DETECTED
C DIFF TOX A+B STL QL IA: NEGATIVE
CALCIUM SERPL-MCNC: 8.7 MG/DL (ref 8.6–10.6)
CALCIUM SERPL-MCNC: 8.8 MG/DL (ref 8.6–10.6)
CHLORIDE SERPL-SCNC: 107 MMOL/L (ref 98–107)
CHLORIDE SERPL-SCNC: 107 MMOL/L (ref 98–107)
CO2 SERPL-SCNC: 19 MMOL/L (ref 21–32)
CO2 SERPL-SCNC: 22 MMOL/L (ref 21–32)
CREAT SERPL-MCNC: 1.24 MG/DL (ref 0.5–1.05)
CREAT SERPL-MCNC: 1.6 MG/DL (ref 0.5–1.05)
EC STX1 GENE STL QL NAA+PROBE: NOT DETECTED
EC STX2 GENE STL QL NAA+PROBE: NOT DETECTED
EGFRCR SERPLBLD CKD-EPI 2021: 34 ML/MIN/1.73M*2
EGFRCR SERPLBLD CKD-EPI 2021: 46 ML/MIN/1.73M*2
EOSINOPHIL # BLD AUTO: 0.01 X10*3/UL (ref 0–0.4)
EOSINOPHIL # BLD AUTO: 0.01 X10*3/UL (ref 0–0.4)
EOSINOPHIL NFR BLD AUTO: 0 %
EOSINOPHIL NFR BLD AUTO: 0 %
ERYTHROCYTE [DISTWIDTH] IN BLOOD BY AUTOMATED COUNT: 18.2 % (ref 11.5–14.5)
ERYTHROCYTE [DISTWIDTH] IN BLOOD BY AUTOMATED COUNT: 18.4 % (ref 11.5–14.5)
GLUCOSE BLD MANUAL STRIP-MCNC: 119 MG/DL (ref 74–99)
GLUCOSE SERPL-MCNC: 126 MG/DL (ref 74–99)
GLUCOSE SERPL-MCNC: 128 MG/DL (ref 74–99)
HCT VFR BLD AUTO: 24 % (ref 36–46)
HCT VFR BLD AUTO: 27 % (ref 36–46)
HGB BLD-MCNC: 6.8 G/DL (ref 12–16)
HGB BLD-MCNC: 8.6 G/DL (ref 12–16)
HOLD SPECIMEN: NORMAL
IMM GRANULOCYTES # BLD AUTO: 0.19 X10*3/UL (ref 0–0.5)
IMM GRANULOCYTES # BLD AUTO: 0.26 X10*3/UL (ref 0–0.5)
IMM GRANULOCYTES NFR BLD AUTO: 0.8 % (ref 0–0.9)
IMM GRANULOCYTES NFR BLD AUTO: 1.1 % (ref 0–0.9)
LEGIONELLA AG UR QL: NEGATIVE
LYMPHOCYTES # BLD AUTO: 0.93 X10*3/UL (ref 0.8–3)
LYMPHOCYTES # BLD AUTO: 1.53 X10*3/UL (ref 0.8–3)
LYMPHOCYTES NFR BLD AUTO: 4 %
LYMPHOCYTES NFR BLD AUTO: 6.2 %
MCH RBC QN AUTO: 21.8 PG (ref 26–34)
MCH RBC QN AUTO: 23.2 PG (ref 26–34)
MCHC RBC AUTO-ENTMCNC: 28.3 G/DL (ref 32–36)
MCHC RBC AUTO-ENTMCNC: 31.9 G/DL (ref 32–36)
MCV RBC AUTO: 73 FL (ref 80–100)
MCV RBC AUTO: 77 FL (ref 80–100)
MONOCYTES # BLD AUTO: 1.27 X10*3/UL (ref 0.05–0.8)
MONOCYTES # BLD AUTO: 1.38 X10*3/UL (ref 0.05–0.8)
MONOCYTES NFR BLD AUTO: 5.4 %
MONOCYTES NFR BLD AUTO: 5.6 %
NEUTROPHILS # BLD AUTO: 21.02 X10*3/UL (ref 1.6–5.5)
NEUTROPHILS # BLD AUTO: 21.37 X10*3/UL (ref 1.6–5.5)
NEUTROPHILS NFR BLD AUTO: 86.9 %
NEUTROPHILS NFR BLD AUTO: 89.6 %
NOROVIRUS GI + GII RNA STL NAA+PROBE: NOT DETECTED
NRBC BLD-RTO: 0.1 /100 WBCS (ref 0–0)
NRBC BLD-RTO: 0.1 /100 WBCS (ref 0–0)
PHOSPHATE SERPL-MCNC: 2.1 MG/DL (ref 2.5–4.9)
PLATELET # BLD AUTO: 267 X10*3/UL (ref 150–450)
PLATELET # BLD AUTO: 315 X10*3/UL (ref 150–450)
POTASSIUM SERPL-SCNC: 2.8 MMOL/L (ref 3.5–5.3)
POTASSIUM SERPL-SCNC: 3.2 MMOL/L (ref 3.5–5.3)
PROT SERPL-MCNC: 7.4 G/DL (ref 6.4–8.2)
RBC # BLD AUTO: 3.12 X10*6/UL (ref 4–5.2)
RBC # BLD AUTO: 3.71 X10*6/UL (ref 4–5.2)
RV RNA STL NAA+PROBE: NOT DETECTED
S PNEUM AG UR QL: NEGATIVE
SALMONELLA DNA STL QL NAA+PROBE: NOT DETECTED
SHIGELLA DNA SPEC QL NAA+PROBE: NOT DETECTED
SODIUM SERPL-SCNC: 138 MMOL/L (ref 136–145)
SODIUM SERPL-SCNC: 138 MMOL/L (ref 136–145)
V CHOLERAE DNA STL QL NAA+PROBE: NOT DETECTED
VANCOMYCIN SERPL-MCNC: 16.3 UG/ML (ref 5–20)
WBC # BLD AUTO: 23.5 X10*3/UL (ref 4.4–11.3)
WBC # BLD AUTO: 24.6 X10*3/UL (ref 4.4–11.3)
Y ENTEROCOL DNA STL QL NAA+PROBE: NOT DETECTED

## 2024-12-12 PROCEDURE — 85025 COMPLETE CBC W/AUTO DIFF WBC: CPT

## 2024-12-12 PROCEDURE — 80053 COMPREHEN METABOLIC PANEL: CPT

## 2024-12-12 PROCEDURE — 2500000001 HC RX 250 WO HCPCS SELF ADMINISTERED DRUGS (ALT 637 FOR MEDICARE OP)

## 2024-12-12 PROCEDURE — 94640 AIRWAY INHALATION TREATMENT: CPT

## 2024-12-12 PROCEDURE — 36415 COLL VENOUS BLD VENIPUNCTURE: CPT

## 2024-12-12 PROCEDURE — 85025 COMPLETE CBC W/AUTO DIFF WBC: CPT | Performed by: STUDENT IN AN ORGANIZED HEALTH CARE EDUCATION/TRAINING PROGRAM

## 2024-12-12 PROCEDURE — 1200000002 HC GENERAL ROOM WITH TELEMETRY DAILY

## 2024-12-12 PROCEDURE — 82947 ASSAY GLUCOSE BLOOD QUANT: CPT

## 2024-12-12 PROCEDURE — 36430 TRANSFUSION BLD/BLD COMPNT: CPT

## 2024-12-12 PROCEDURE — 99233 SBSQ HOSP IP/OBS HIGH 50: CPT | Performed by: STUDENT IN AN ORGANIZED HEALTH CARE EDUCATION/TRAINING PROGRAM

## 2024-12-12 PROCEDURE — 80069 RENAL FUNCTION PANEL: CPT | Mod: CCI | Performed by: STUDENT IN AN ORGANIZED HEALTH CARE EDUCATION/TRAINING PROGRAM

## 2024-12-12 PROCEDURE — 2500000004 HC RX 250 GENERAL PHARMACY W/ HCPCS (ALT 636 FOR OP/ED)

## 2024-12-12 PROCEDURE — 2500000002 HC RX 250 W HCPCS SELF ADMINISTERED DRUGS (ALT 637 FOR MEDICARE OP, ALT 636 FOR OP/ED)

## 2024-12-12 PROCEDURE — P9016 RBC LEUKOCYTES REDUCED: HCPCS

## 2024-12-12 PROCEDURE — 80202 ASSAY OF VANCOMYCIN: CPT

## 2024-12-12 RX ORDER — VANCOMYCIN HYDROCHLORIDE 125 MG/1
125 CAPSULE ORAL 4 TIMES DAILY
Status: DISCONTINUED | OUTPATIENT
Start: 2024-12-12 | End: 2024-12-19 | Stop reason: HOSPADM

## 2024-12-12 RX ORDER — IPRATROPIUM BROMIDE AND ALBUTEROL SULFATE 2.5; .5 MG/3ML; MG/3ML
3 SOLUTION RESPIRATORY (INHALATION)
Status: DISCONTINUED | OUTPATIENT
Start: 2024-12-12 | End: 2024-12-12

## 2024-12-12 RX ORDER — IPRATROPIUM BROMIDE AND ALBUTEROL SULFATE 2.5; .5 MG/3ML; MG/3ML
3 SOLUTION RESPIRATORY (INHALATION)
Status: DISCONTINUED | OUTPATIENT
Start: 2024-12-13 | End: 2024-12-17

## 2024-12-12 RX ORDER — GUAIFENESIN 600 MG/1
600 TABLET, EXTENDED RELEASE ORAL 2 TIMES DAILY PRN
Status: DISCONTINUED | OUTPATIENT
Start: 2024-12-12 | End: 2024-12-19 | Stop reason: HOSPADM

## 2024-12-12 RX ORDER — POTASSIUM CHLORIDE 20 MEQ/1
60 TABLET, EXTENDED RELEASE ORAL ONCE
Status: COMPLETED | OUTPATIENT
Start: 2024-12-12 | End: 2024-12-12

## 2024-12-12 RX ORDER — POTASSIUM CHLORIDE 20 MEQ/1
40 TABLET, EXTENDED RELEASE ORAL ONCE
Status: COMPLETED | OUTPATIENT
Start: 2024-12-12 | End: 2024-12-12

## 2024-12-12 SDOH — ECONOMIC STABILITY: INCOME INSECURITY: IN THE PAST 12 MONTHS HAS THE ELECTRIC, GAS, OIL, OR WATER COMPANY THREATENED TO SHUT OFF SERVICES IN YOUR HOME?: NO

## 2024-12-12 SDOH — SOCIAL STABILITY: SOCIAL INSECURITY
WITHIN THE LAST YEAR, HAVE YOU BEEN RAPED OR FORCED TO HAVE ANY KIND OF SEXUAL ACTIVITY BY YOUR PARTNER OR EX-PARTNER?: NO

## 2024-12-12 SDOH — ECONOMIC STABILITY: FOOD INSECURITY: HOW HARD IS IT FOR YOU TO PAY FOR THE VERY BASICS LIKE FOOD, HOUSING, MEDICAL CARE, AND HEATING?: SOMEWHAT HARD

## 2024-12-12 SDOH — SOCIAL STABILITY: SOCIAL INSECURITY: ARE YOU OR HAVE YOU BEEN THREATENED OR ABUSED PHYSICALLY, EMOTIONALLY, OR SEXUALLY BY ANYONE?: NO

## 2024-12-12 SDOH — ECONOMIC STABILITY: FOOD INSECURITY: WITHIN THE PAST 12 MONTHS, THE FOOD YOU BOUGHT JUST DIDN'T LAST AND YOU DIDN'T HAVE MONEY TO GET MORE.: NEVER TRUE

## 2024-12-12 SDOH — SOCIAL STABILITY: SOCIAL NETWORK: IN A TYPICAL WEEK, HOW MANY TIMES DO YOU TALK ON THE PHONE WITH FAMILY, FRIENDS, OR NEIGHBORS?: ONCE A WEEK

## 2024-12-12 SDOH — ECONOMIC STABILITY: HOUSING INSECURITY: AT ANY TIME IN THE PAST 12 MONTHS, WERE YOU HOMELESS OR LIVING IN A SHELTER (INCLUDING NOW)?: NO

## 2024-12-12 SDOH — ECONOMIC STABILITY: FOOD INSECURITY: WITHIN THE PAST 12 MONTHS, YOU WORRIED THAT YOUR FOOD WOULD RUN OUT BEFORE YOU GOT THE MONEY TO BUY MORE.: NEVER TRUE

## 2024-12-12 SDOH — SOCIAL STABILITY: SOCIAL NETWORK: HOW OFTEN DO YOU ATTEND MEETINGS OF THE CLUBS OR ORGANIZATIONS YOU BELONG TO?: NEVER

## 2024-12-12 SDOH — SOCIAL STABILITY: SOCIAL INSECURITY: WITHIN THE LAST YEAR, HAVE YOU BEEN HUMILIATED OR EMOTIONALLY ABUSED IN OTHER WAYS BY YOUR PARTNER OR EX-PARTNER?: NO

## 2024-12-12 SDOH — SOCIAL STABILITY: SOCIAL INSECURITY: ARE YOU MARRIED, WIDOWED, DIVORCED, SEPARATED, NEVER MARRIED, OR LIVING WITH A PARTNER?: NEVER MARRIED

## 2024-12-12 SDOH — ECONOMIC STABILITY: HOUSING INSECURITY: IN THE LAST 12 MONTHS, WAS THERE A TIME WHEN YOU WERE NOT ABLE TO PAY THE MORTGAGE OR RENT ON TIME?: NO

## 2024-12-12 SDOH — HEALTH STABILITY: MENTAL HEALTH
DO YOU FEEL STRESS - TENSE, RESTLESS, NERVOUS, OR ANXIOUS, OR UNABLE TO SLEEP AT NIGHT BECAUSE YOUR MIND IS TROUBLED ALL THE TIME - THESE DAYS?: TO SOME EXTENT

## 2024-12-12 SDOH — SOCIAL STABILITY: SOCIAL INSECURITY: HAS ANYONE EVER THREATENED TO HURT YOUR FAMILY OR YOUR PETS?: NO

## 2024-12-12 SDOH — SOCIAL STABILITY: SOCIAL INSECURITY: DO YOU FEEL ANYONE HAS EXPLOITED OR TAKEN ADVANTAGE OF YOU FINANCIALLY OR OF YOUR PERSONAL PROPERTY?: NO

## 2024-12-12 SDOH — SOCIAL STABILITY: SOCIAL INSECURITY: HAVE YOU HAD ANY THOUGHTS OF HARMING ANYONE ELSE?: NO

## 2024-12-12 SDOH — SOCIAL STABILITY: SOCIAL INSECURITY: ABUSE: ADULT

## 2024-12-12 SDOH — SOCIAL STABILITY: SOCIAL INSECURITY: ARE THERE ANY APPARENT SIGNS OF INJURIES/BEHAVIORS THAT COULD BE RELATED TO ABUSE/NEGLECT?: NO

## 2024-12-12 SDOH — HEALTH STABILITY: PHYSICAL HEALTH: ON AVERAGE, HOW MANY DAYS PER WEEK DO YOU ENGAGE IN MODERATE TO STRENUOUS EXERCISE (LIKE A BRISK WALK)?: 0 DAYS

## 2024-12-12 SDOH — SOCIAL STABILITY: SOCIAL INSECURITY
WITHIN THE LAST YEAR, HAVE YOU BEEN KICKED, HIT, SLAPPED, OR OTHERWISE PHYSICALLY HURT BY YOUR PARTNER OR EX-PARTNER?: NO

## 2024-12-12 SDOH — SOCIAL STABILITY: SOCIAL NETWORK
DO YOU BELONG TO ANY CLUBS OR ORGANIZATIONS SUCH AS CHURCH GROUPS, UNIONS, FRATERNAL OR ATHLETIC GROUPS, OR SCHOOL GROUPS?: NO

## 2024-12-12 SDOH — HEALTH STABILITY: PHYSICAL HEALTH
HOW OFTEN DO YOU NEED TO HAVE SOMEONE HELP YOU WHEN YOU READ INSTRUCTIONS, PAMPHLETS, OR OTHER WRITTEN MATERIAL FROM YOUR DOCTOR OR PHARMACY?: NEVER

## 2024-12-12 SDOH — SOCIAL STABILITY: SOCIAL INSECURITY: WITHIN THE LAST YEAR, HAVE YOU BEEN AFRAID OF YOUR PARTNER OR EX-PARTNER?: NO

## 2024-12-12 SDOH — SOCIAL STABILITY: SOCIAL INSECURITY: DO YOU FEEL UNSAFE GOING BACK TO THE PLACE WHERE YOU ARE LIVING?: NO

## 2024-12-12 SDOH — SOCIAL STABILITY: SOCIAL INSECURITY: WERE YOU ABLE TO COMPLETE ALL THE BEHAVIORAL HEALTH SCREENINGS?: YES

## 2024-12-12 SDOH — SOCIAL STABILITY: SOCIAL INSECURITY: HAVE YOU HAD THOUGHTS OF HARMING ANYONE ELSE?: YES

## 2024-12-12 SDOH — HEALTH STABILITY: PHYSICAL HEALTH: ON AVERAGE, HOW MANY MINUTES DO YOU ENGAGE IN EXERCISE AT THIS LEVEL?: 0 MIN

## 2024-12-12 SDOH — ECONOMIC STABILITY: TRANSPORTATION INSECURITY: IN THE PAST 12 MONTHS, HAS LACK OF TRANSPORTATION KEPT YOU FROM MEDICAL APPOINTMENTS OR FROM GETTING MEDICATIONS?: NO

## 2024-12-12 SDOH — SOCIAL STABILITY: SOCIAL NETWORK: HOW OFTEN DO YOU GET TOGETHER WITH FRIENDS OR RELATIVES?: ONCE A WEEK

## 2024-12-12 ASSESSMENT — COGNITIVE AND FUNCTIONAL STATUS - GENERAL
CLIMB 3 TO 5 STEPS WITH RAILING: A LITTLE
STANDING UP FROM CHAIR USING ARMS: A LITTLE
PATIENT BASELINE BEDBOUND: NO
STANDING UP FROM CHAIR USING ARMS: A LITTLE
WALKING IN HOSPITAL ROOM: A LITTLE
MOBILITY SCORE: 18
MOVING TO AND FROM BED TO CHAIR: A LITTLE
CLIMB 3 TO 5 STEPS WITH RAILING: A LITTLE
DAILY ACTIVITIY SCORE: 24
WALKING IN HOSPITAL ROOM: A LITTLE
MOBILITY SCORE: 18
DRESSING REGULAR UPPER BODY CLOTHING: A LITTLE
MOVING TO AND FROM BED TO CHAIR: A LITTLE
TURNING FROM BACK TO SIDE WHILE IN FLAT BAD: A LITTLE
TURNING FROM BACK TO SIDE WHILE IN FLAT BAD: A LITTLE
DAILY ACTIVITIY SCORE: 23
MOVING FROM LYING ON BACK TO SITTING ON SIDE OF FLAT BED WITH BEDRAILS: A LITTLE
MOVING FROM LYING ON BACK TO SITTING ON SIDE OF FLAT BED WITH BEDRAILS: A LITTLE

## 2024-12-12 ASSESSMENT — ACTIVITIES OF DAILY LIVING (ADL)
DRESSING YOURSELF: INDEPENDENT
GROOMING: INDEPENDENT
FEEDING YOURSELF: INDEPENDENT
LACK_OF_TRANSPORTATION: NO
JUDGMENT_ADEQUATE_SAFELY_COMPLETE_DAILY_ACTIVITIES: YES
HEARING - RIGHT EAR: FUNCTIONAL
HEARING - LEFT EAR: FUNCTIONAL
BATHING: INDEPENDENT
ADEQUATE_TO_COMPLETE_ADL: YES
LACK_OF_TRANSPORTATION: NO
PATIENT'S MEMORY ADEQUATE TO SAFELY COMPLETE DAILY ACTIVITIES?: YES
WALKS IN HOME: INDEPENDENT
TOILETING: NEEDS ASSISTANCE
LACK_OF_TRANSPORTATION: NO

## 2024-12-12 ASSESSMENT — LIFESTYLE VARIABLES
PRESCIPTION_ABUSE_PAST_12_MONTHS: NO
HOW OFTEN DO YOU HAVE 6 OR MORE DRINKS ON ONE OCCASION: NEVER
AUDIT-C TOTAL SCORE: 1
HOW OFTEN DO YOU HAVE A DRINK CONTAINING ALCOHOL: MONTHLY OR LESS
SKIP TO QUESTIONS 9-10: 1
SUBSTANCE_ABUSE_PAST_12_MONTHS: NO
HOW MANY STANDARD DRINKS CONTAINING ALCOHOL DO YOU HAVE ON A TYPICAL DAY: PATIENT DOES NOT DRINK
AUDIT-C TOTAL SCORE: 1

## 2024-12-12 ASSESSMENT — PAIN SCALES - GENERAL: PAINLEVEL_OUTOF10: 0 - NO PAIN

## 2024-12-12 ASSESSMENT — PATIENT HEALTH QUESTIONNAIRE - PHQ9
SUM OF ALL RESPONSES TO PHQ9 QUESTIONS 1 & 2: 0
1. LITTLE INTEREST OR PLEASURE IN DOING THINGS: NOT AT ALL
2. FEELING DOWN, DEPRESSED OR HOPELESS: NOT AT ALL

## 2024-12-12 NOTE — NURSING NOTE
Patient arrived to the floor approximately at 0005.  Patient is alert and oriented x4, afebrile, no c/o pain, no sob.  Patients bed is in the lowest position side rails up x 3.  Patient's call light is in reach.

## 2024-12-12 NOTE — CONSULTS
Vancomycin Dosing by Pharmacy- Cessation of Therapy    Consult to pharmacy for vancomycin dosing has been discontinued by the prescriber, pharmacy will sign off at this time.    Please call pharmacy if there are further questions or re-enter a consult if vancomycin is resumed.     RAZIA HINSON, PharmD

## 2024-12-12 NOTE — PROGRESS NOTES
Megan Holt is a 74 y.o. female on day 1 of admission presenting with Community acquired pneumonia, unspecified laterality.      Subjective   No acute overnight events. Patient reports small amount of improvement in her symptoms today. She had some abdominal pain from a recent appendectomy. Otherwise, no acute concerns.        Objective     Last Recorded Vitals  /68   Pulse 102   Temp (!) 38.2 °C (100.8 °F)   Resp 18   Wt 71.7 kg (158 lb 1.1 oz)   SpO2 99%   Intake/Output last 3 Shifts:    Intake/Output Summary (Last 24 hours) at 12/12/2024 1325  Last data filed at 12/12/2024 0859  Gross per 24 hour   Intake 4950 ml   Output 350 ml   Net 4600 ml       Admission Weight  Weight: 71.7 kg (158 lb) (12/11/24 1018)    Daily Weight  12/12/24 : 71.7 kg (158 lb 1.1 oz)    Image Results  ECG 12 lead  Sinus tachycardia  Nonspecific ST and T wave abnormality  Abnormal ECG  When compared with ECG of 16-APR-2018 13:10,  Vent. rate has increased BY  71 BPM  ST now depressed in Lateral leads    See ED provider note for full interpretation and clinical correlation  Confirmed by Jennifer Grant ([id number]) on 12/11/2024 8:46:11 PM  CT abdomen pelvis w IV contrast  Narrative: Interpreted By:  Cuate Mulligan and Korakavi Nisha   STUDY:  CT ABDOMEN PELVIS W IV CONTRAST;  12/11/2024 1:31 pm      INDICATION:  Signs/Symptoms:sepsis, recent appy.          COMPARISON:  CT chest 06/27/2023.      ACCESSION NUMBER(S):  [id number]      ORDERING CLINICIAN:  AWILDA JAIME      TECHNIQUE:  CT of the abdomen and pelvis was performed.  Standard contiguous  axial images were obtained at 3 mm slice thickness through the  abdomen and pelvis. Coronal and sagittal reconstructions at 3 mm  slice thickness were performed.  90 ML of Omnipaque 350 was  administered intravenously without immediate complication.      FINDINGS:  LOWER CHEST:  New ground-glass opacities in the right-greater-than-left posterior  bilateral lower lobes when compared to CT  chest 06/27/2023. However  recommend looking at same day CTA chest for PE for full examination  findings.      No pneumothorax or pleural effusion.      The heart is mildly enlarged in size with trace pericardial effusion.  Moderate coronary artery calcifications.      Visualized esophagus appears normal.      ABDOMEN:      LIVER:  The liver is normal in size. There is a too small to characterize  hypodense lesion in the left hepatic lobe this is statistically  favored represent a cyst. Mild hepatic steatosis.      BILE DUCTS:  The intrahepatic and extrahepatic ducts are not dilated.      GALLBLADDER:  There is a large radiopaque stone within the neck of the gallbladder.  There is mild gallbladder wall thickening.      PANCREAS:  Unremarkable      SPLEEN:  The spleen is normal in size and unremarkable.      ADRENAL GLANDS:  Bilateral adrenal glands appear normal.      KIDNEYS AND URETERS:  The kidneys are normal in size. There are patchy areas of  hypoenhancement in bilateral renal cortices and mild bilateral  perinephric edema. There are multiple bilateral cystic lesions in the  kidneys, the largest on the right in the superior pole measuring 2.7  cm and with a partially calcified rim, and the largest on the left in  the superior pole measuring 2.8 cm. No hydroureteronephrosis or  nephroureterolithiasis is identified.      PELVIS:      BLADDER:  Unremarkable.      REPRODUCTIVE ORGANS:  The uterus is present. There are multiple calcified lesions in the  uterus likely represent calcified leiomyomas.      BOWEL:  The stomach is unremarkable.   The small and large bowel are normal  in caliber and demonstrate no wall thickening. Sigmoidal colonic  diverticulosis without evidence of diverticulitis.    The appendix is  surgically absent.      VESSELS:  There is no aneurysmal dilatation of the abdominal aorta. Moderate  atherosclerotic disease of the abdominal aorta and its branches. The  IVC appears normal.       PERITONEUM/RETROPERITONEUM/LYMPH NODES:  No ascites or free air, no fluid collection.  No abdominopelvic  lymphadenopathy is present.      BONES AND ABDOMINAL WALL:  No suspicious osseous lesions are identified. Mild multilevel  degenerative discogenic disease is noted in the lower thoracic and  lumbar spine.  The abdominal wall soft tissues appear normal.      Impression: 1.  Patchy areas of hypoenhancement in the bilateral renal cortices  mild bilateral perinephric edema suggestive of pyelonephritis. There  is no discrete walled fluid collection or obstructive  nephroureterolithiasis.  2. Incidental findings include mild hepatic steatosis, nonspecific  mild gallbladder wall thickening, and multiple calcified leiomyomas  in the uterus.  3. Mild cardiomegaly with trace pericardial effusion.      I personally reviewed the images/study and I agree with the findings  as stated by Resident Dr. Jyoti Malcolm MD. This study was  interpreted at [place], [place].      MACRO:  None      Signed by: Cuate Mulligan 12/11/2024 5:00 PM  Dictation workstation:   [id number]  CT angio chest for pulmonary embolism  Narrative: Interpreted By:  Rick Mcclelland and Stevens Alex   STUDY:  CT ANGIO CHEST FOR PULMONARY EMBOLISM;  12/11/2024 1:31 pm      INDICATION:  Signs/Symptoms:sob, hypoxic.      74-year-old female past medical history remote breast cancer status  post right breast mastectomy, hypertension, recent appendectomy on  10/04 status post appendicitis with perforation who presents to the  ED with flu-like symptoms that began yesterday. In the emergency  department patient is tachycardic with heart rates to the 150s and  hypoxic to the low 80s.      COMPARISON:  CT chest 06/27/2023, CT abdomen and pelvis 10/03/2024      ACCESSION NUMBER(S):  [id number]      ORDERING CLINICIAN:  AWILDA JAIME      TECHNIQUE:  Helical data acquisition of the chest was obtained after  intravenous  administration of 90 ML Omnipaque 350, as per PE protocol. Images  were reformatted in coronal and sagittal planes. Axial and coronal  maximum intensity projection (MIP) images were created and reviewed.      FINDINGS:  POTENTIAL LIMITATIONS OF THE STUDY: None      HEART AND VESSELS:  There are no discrete filling defects within main pulmonary artery  and its branches to suggest acute pulmonary embolism. Main pulmonary  artery and its branches are normal in caliber.      The thoracic aorta normal in course and caliber.There is moderate  atherosclerosis present, including calcified and noncalcified  plaques.Although, the study is not tailored for evaluation of aorta,  there is no definite evidence of acute aortic pathology. Moderate  coronary artery calcifications are seen. Please note,the study is not  optimized for evaluation of coronary arteries.      The cardiac chambers are not enlarged.There are no findings to  suggest right heart strain. There is no pericardial effusion seen.      MEDIASTINUM AND PORTILLO, LOWER NECK AND AXILLA:  Stable appearance of thyromegaly with right lobe heterogeneity.  No evidence of thoracic lymphadenopathy by CT criteria.  Esophagus appears within normal limits as seen.      LUNGS AND AIRWAYS:  The trachea and central airways are patent. No endobronchial lesion  is seen.      There are bilateral dependent opacities, which are felt to be  atelectatic in nature. There is diffuse bilateral interlobular septal  thickening, with an right-sided predominance. . There is a new 5 mm  pulmonary nodule within the right middle lobe (series 402, image  158). Other scattered pulmonary nodules, predominantly within the  right lobe, which are stable in size. Moderate centrilobular  predominant emphysema      UPPER ABDOMEN:  Partially visualized cholelithiasis similar in appearance compared to  prior examination; however, please refer to same-day CT abdomen and  pelvis for further  characterization. Bilateral hypodense lesions  within the renal parenchyma which are consistent with benign cysts.      CHEST WALL AND OSSEOUS STRUCTURES:  Chest wall is within normal limits.  No acute osseous pathology.There are no suspicious osseous lesions.      Impression: 1. No evidence of acute pulmonary embolism.  2. Diffuse bilateral interlobular septal thickening with asymmetric  prominence towards right. In part, findings may be secondary to low  lung volumes/poor inspiratory effort; however, pulmonary edema and/or  superimposed atypical infection cannot be excluded.  3. Moderate centrilobular predominant emphysema.  4. Interval development of a 5 mm pulmonary nodule within the right  middle lobe. Recommend follow-up CT of the chest in 6 months to  evaluate for stability  5. Other non-acute, chronic findings as described above.      I personally reviewed the images/study and I agree with the findings  as stated by Kaz Sousa DO PGY-2. This study was interpreted at  [place], [place].      MACRO:  None      Signed by: Rick Mcclelland 12/11/2024 3:31 PM  Dictation workstation:   [id number]  XR chest 1 view  Narrative: Interpreted By:  Idris Perkins,   STUDY:  XR CHEST 1 VIEW;  12/11/2024 10:40 am      INDICATION:  Signs/Symptoms:sepsis.          COMPARISON:  None.      ACCESSION NUMBER(S):  [id number]      ORDERING CLINICIAN:  LEWIS GALLOWAY      FINDINGS:                  CARDIOMEDIASTINAL SILHOUETTE:  Cardiomediastinal silhouette is normal in size and configuration.      LUNGS:  Increased lung markings bilaterally in a perihilar and biapical and  distribution. No effusion seen.      ABDOMEN:  No remarkable upper abdominal findings.      BONES:  No acute osseous changes.      Impression: 1. Increased lung markings bilaterally. Atypical/viral pneumonia is  highly differential. A component of mild edema may be present as well              MACRO:  None      Signed  by: Idris Perkins 12/11/2024 10:59 AM  Dictation workstation:   [id number]      Physical Exam  Constitutional:       Appearance: Normal appearance.   HENT:      Head: Normocephalic and atraumatic.      Mouth/Throat:      Mouth: Mucous membranes are moist.      Pharynx: Oropharynx is clear.   Eyes:      Extraocular Movements: Extraocular movements intact.      Pupils: Pupils are equal, round, and reactive to light.   Cardiovascular:      Rate and Rhythm: Normal rate and regular rhythm.      Pulses: Normal pulses.      Heart sounds: Normal heart sounds.   Pulmonary:      Effort: Pulmonary effort is normal. No respiratory distress.      Breath sounds: Normal breath sounds.   Abdominal:      General: Bowel sounds are normal. There is no distension.      Palpations: Abdomen is soft.      Tenderness: There is abdominal tenderness.      Comments: Upper abdominal tenderness to palpation   Skin:     General: Skin is warm and dry.   Neurological:      Mental Status: She is alert and oriented to person, place, and time.   Psychiatric:         Mood and Affect: Mood normal.         Behavior: Behavior normal.         Relevant Results  Results for orders placed or performed during the hospital encounter of 12/11/24 (from the past 24 hours)   Lactate   Result Value Ref Range    Lactate 0.4 0.4 - 2.0 mmol/L   Urinalysis with Reflex Culture and Microscopic   Result Value Ref Range    Color, Urine Light-Yellow Light-Yellow, Yellow, Dark-Yellow    Appearance, Urine Clear Clear    Specific Gravity, Urine 1.021 1.005 - 1.035    pH, Urine 5.5 5.0, 5.5, 6.0, 6.5, 7.0, 7.5, 8.0    Protein, Urine 70 (1+) (A) NEGATIVE, 10 (TRACE), 20 (TRACE) mg/dL    Glucose, Urine Normal Normal mg/dL    Blood, Urine 0.06 (1+) (A) NEGATIVE    Ketones, Urine NEGATIVE NEGATIVE mg/dL    Bilirubin, Urine NEGATIVE NEGATIVE    Urobilinogen, Urine Normal Normal mg/dL    Nitrite, Urine NEGATIVE NEGATIVE    Leukocyte Esterase, Urine 250 Terese/µL (A) NEGATIVE   Extra  Urine Gray Tube   Result Value Ref Range    Extra Tube Hold for add-ons.    Microscopic Only, Urine   Result Value Ref Range    WBC, Urine 21-50 (A) 1-5, NONE /HPF    RBC, Urine 3-5 NONE, 1-2, 3-5 /HPF    Squamous Epithelial Cells, Urine 1-9 (SPARSE) Reference range not established. /HPF    Mucus, Urine FEW Reference range not established. /LPF   Troponin I, High Sensitivity   Result Value Ref Range    Troponin I, High Sensitivity (CMC) 105 (H) 0 - 34 ng/L   VERIFY ABO/Rh Group Test   Result Value Ref Range    ABO TYPE O     Rh TYPE POS    MRSA Surveillance for Vancomycin De-escalation, PCR    Specimen: Anterior Nares; Swab   Result Value Ref Range    MRSA PCR Not Detected Not Detected   C. difficile, PCR    Specimen: Stool   Result Value Ref Range    C. difficile, PCR Detected (A) Not Detected   Clostridium Difficile EIA    Specimen: Stool   Result Value Ref Range    C. difficile (Toxin A/B) Negative Negative, Indeterminate   Renal function panel   Result Value Ref Range    Glucose 133 (H) 74 - 99 mg/dL    Sodium 134 (L) 136 - 145 mmol/L    Potassium 2.9 (LL) 3.5 - 5.3 mmol/L    Chloride 104 98 - 107 mmol/L    Bicarbonate 20 (L) 21 - 32 mmol/L    Anion Gap 13 10 - 20 mmol/L    Urea Nitrogen 41 (H) 6 - 23 mg/dL    Creatinine 1.73 (H) 0.50 - 1.05 mg/dL    eGFR 31 (L) >60 mL/min/1.73m*2    Calcium 8.1 (L) 8.6 - 10.6 mg/dL    Phosphorus 2.7 2.5 - 4.9 mg/dL    Albumin 2.9 (L) 3.4 - 5.0 g/dL   Blood Gas Venous Full Panel   Result Value Ref Range    POCT pH, Venous 7.41 7.33 - 7.43 pH    POCT pCO2, Venous 31 (L) 41 - 51 mm Hg    POCT pO2, Venous 42 35 - 45 mm Hg    POCT SO2, Venous 72 45 - 75 %    POCT Oxy Hemoglobin, Venous 70.2 45.0 - 75.0 %    POCT Hematocrit Calculated, Venous 19.0 (L) 36.0 - 46.0 %    POCT Sodium, Venous 137 136 - 145 mmol/L    POCT Potassium, Venous 3.0 (L) 3.5 - 5.3 mmol/L    POCT Chloride, Venous 108 (H) 98 - 107 mmol/L    POCT Ionized Calicum, Venous 1.12 1.10 - 1.33 mmol/L    POCT Glucose,  Venous 119 (H) 74 - 99 mg/dL    POCT Lactate, Venous 1.1 0.4 - 2.0 mmol/L    POCT Base Excess, Venous -4.6 (L) -2.0 - 3.0 mmol/L    POCT HCO3 Calculated, Venous 19.6 (L) 22.0 - 26.0 mmol/L    POCT Hemoglobin, Venous 6.4 (LL) 12.0 - 16.0 g/dL    POCT Anion Gap, Venous 12.0 10.0 - 25.0 mmol/L    Patient Temperature 37.0 degrees Celsius    FiO2 21 %   CBC and Auto Differential   Result Value Ref Range    WBC 23.5 (H) 4.4 - 11.3 x10*3/uL    nRBC 0.1 (H) 0.0 - 0.0 /100 WBCs    RBC 3.12 (L) 4.00 - 5.20 x10*6/uL    Hemoglobin 6.8 (L) 12.0 - 16.0 g/dL    Hematocrit 24.0 (L) 36.0 - 46.0 %    MCV 77 (L) 80 - 100 fL    MCH 21.8 (L) 26.0 - 34.0 pg    MCHC 28.3 (L) 32.0 - 36.0 g/dL    RDW 18.2 (H) 11.5 - 14.5 %    Platelets 267 150 - 450 x10*3/uL    Neutrophils % 89.6 40.0 - 80.0 %    Immature Granulocytes %, Automated 0.8 0.0 - 0.9 %    Lymphocytes % 4.0 13.0 - 44.0 %    Monocytes % 5.4 2.0 - 10.0 %    Eosinophils % 0.0 0.0 - 6.0 %    Basophils % 0.2 0.0 - 2.0 %    Neutrophils Absolute 21.02 (H) 1.60 - 5.50 x10*3/uL    Immature Granulocytes Absolute, Automated 0.19 0.00 - 0.50 x10*3/uL    Lymphocytes Absolute 0.93 0.80 - 3.00 x10*3/uL    Monocytes Absolute 1.27 (H) 0.05 - 0.80 x10*3/uL    Eosinophils Absolute 0.01 0.00 - 0.40 x10*3/uL    Basophils Absolute 0.04 0.00 - 0.10 x10*3/uL   Comprehensive metabolic panel   Result Value Ref Range    Glucose 128 (H) 74 - 99 mg/dL    Sodium 138 136 - 145 mmol/L    Potassium 3.2 (L) 3.5 - 5.3 mmol/L    Chloride 107 98 - 107 mmol/L    Bicarbonate 19 (L) 21 - 32 mmol/L    Anion Gap 15 10 - 20 mmol/L    Urea Nitrogen 41 (H) 6 - 23 mg/dL    Creatinine 1.60 (H) 0.50 - 1.05 mg/dL    eGFR 34 (L) >60 mL/min/1.73m*2    Calcium 8.8 8.6 - 10.6 mg/dL    Albumin 3.1 (L) 3.4 - 5.0 g/dL    Alkaline Phosphatase 59 33 - 136 U/L    Total Protein 7.4 6.4 - 8.2 g/dL    AST 30 9 - 39 U/L    Bilirubin, Total 0.5 0.0 - 1.2 mg/dL    ALT 12 7 - 45 U/L   POCT GLUCOSE   Result Value Ref Range    POCT Glucose 119 (H)  74 - 99 mg/dL   Prepare RBC: 1 Units   Result Value Ref Range    PRODUCT CODE A6079U15     Unit Number [id number]     Unit ABO O     Unit RH POS     XM INTEP COMP     Dispense Status IS     Blood Expiration Date 1/2/2025 11:59:00 PM EST     PRODUCT BLOOD TYPE 5100     UNIT VOLUME 335    Vancomycin   Result Value Ref Range    Vancomycin 16.3 5.0 - 20.0 ug/mL        Scheduled medications  [Held by provider] amLODIPine, 5 mg, oral, Daily  azithromycin, 500 mg, oral, q24h DOUGLAS  [Held by provider] hydroCHLOROthiazide, 25 mg, oral, Daily  ipratropium-albuteroL, 3 mL, nebulization, q6h  [Held by provider] lisinopril, 20 mg, oral, Daily  piperacillin-tazobactam, 3.375 g, intravenous, q6h  vancomycin, 125 mg, oral, 4x daily      Continuous medications     PRN medications  PRN medications: guaiFENesin       Assessment/Plan      Megan Holt is a 74 y.o. female with a PMHx of pulmonary nodules, tobacco use, HTN, R breast cancer (s/p mastectomy 1993, s/p tamoxifen for 5 years), and recent appendicitis (s/p appendectomy 10/4/24) presenting with urinary incontinence, cough, diarrhea and shortness of breath. Imaging demonstrates likely underlying pneumonia and pyelonephritis. Labs also showing JAYDE and microcytic anemia.     Patient still had hgb<7 after blood transfusion last night. Ordered 1 unit pRBCs. Repleting low potassium. Both blood cultures positive for E. Coli with susceptibilities pending. C diff PCR positive, but toxin panel negative. Will continue patient on azithromycin, Zosyn, and oral vancomycin.     Updates 12/12:  -Continue azithromycin and Zosyn  -Added oral vancomycin for C diff  -Will follow up infectious workup  -Will follow up E. coli susceptibility results  -Will follow up hemolysis labs  -Can consider IV fluid bolus if low blood pressures and poor PO fluid intake    #E. coli bacteremia  #Sepsis  ::Both blood cultures positive for E. coli  ::Possible sources include urinary tract or colon  -Continue Zosyn  while awaiting susceptibility panel results  -Can consider IV fluid bolus if low blood pressures and poor PO fluid intake     #Pneumonia  ::1 week history of non-productive cough  ::Chest x-ray: increased lung markings bilaterally  ::CT angio chest: diffuse bilateral interlobular septal thickening with asymmetric prominence towards right   ::Elevated WBC of 20.2 on admission (neutrophil predominant)  ::Flu/Covid/RSV, MRSA negative  ::Lactate 6.8>0.4 following IV fluid bolus  -Sputum, blood, and urine cultures ordered  -Strep pneumo and legionella tests pending   -Currently on Zosyn and azithromycin  -PRN guaifenesin  -DuoNeb q6h     #Pyelonephritis  ::CT A/P w/ contrast: Patchy areas of hypoenhancement in the bilateral renal cortices mild bilateral perinephric edema suggestive of pyelonephritis   ::UA positive for leukocyte esterase  -Urine cultures pending  -Currently on vancomycin, Zosyn, and azithromycin     #Microcytic anemia  ::Hgb 6.3, MCV 66 on admission  ::Patient not reporting recent history of bleeds. However, states that she hasn't recently taken her iron supplement  ::Patient reporting no recent colonoscopy  ::  -Additional 1 unit pRBCs ordered  -Post transfusion CBC ordered  -Hemolysis labs pending     #Diarrhea  #C diff positive  ::1 week hx of diarrhea  ::S/p 2.25L IV fluids in ED  ::C diff PCR positive, toxin negative   -Stool pathogen panel  -Added oral vancomycin for C diff     #JAYDE  ::Cr 1.79 (vs baseline Cr 1.0)  ::S/p 2.25L IV fluids in ED   ::Patient required IV contrast for workup of sepsis in ED  -Daily RFPs     #Hypokalemia  ::K 2.9 on admission  ::Given oral potassium chloride in ED  -Daily RFPs  -Continue to replete as needed     #Elevated troponin  ::Troponin 184>603>105  ::EKG showed sinus tachycardia  ::  ::Likely related to demand ischemia iso hypotension     #HTN  ::Home meds: amlodipine 5 mg daily, lisinopril 20 mg daily, hydrochlorothiazide 25 mg daily  -Holding home  antihypertensives due to drop in blood pressure while in ED     #Pulmonary nodules  ::Patient has a history of pulmonary nodules  ::New 5 mm nodule noted in R lung  -Repeat CT chest in 6 months recommended     F: PRN  E: PRN  N: Regular diet  A: PIV  DVT ppx: SCDs  Bowel ppx: Miralax     Code Status: Full Code  Emergency contact: LEODAN ALAMO (Daughter) [phone]            Queen GERRY Treviño MD

## 2024-12-12 NOTE — CONSULTS
"Nutrition Initial Assessment:   Nutrition Assessment    Reason for Assessment: Admission nursing screening    Patient is a 74 y.o. female presenting with past medical history of remote breast cancer status post right breast mastectomy, hypertension, and recent appendectomy on 10/4 status post appendicitis with perforation who presents to the ED with flulike symptoms that began the day prior, fever, hypotension, tachycardia, hypoxemia /sepsis. Patient was having 1 week of diarrhea and severe fatigue and decreased oral intake and low appetite .       Nutrition History:  Energy Intake: Poor < 50 %  Food and Nutrient History: Met with pt this afternoon. Pt was drousy and tired at time of interview. Pt reported her appetite has been nonexistent the past week due to illness and nausea. Pt reported she was only able to nibble on things, mostly fruit. Pt reported this past Sunday she ate 1/2 a sandwich her daughter brought her and drank 100% of her Ensures Saturday and Sunday that her daughter also brought her. Pt didn't eat much afterward as she experienced abdominal pain and diarrhea. Pt reported this morning she was feeling better and had no nausea, but some pressure in her head. Pt reported eating 1/2 bowl of oatmeal, 1/2 serving of eggs, cranberry juice, and 2% milk. Pt reported her appetite is better, but still low. Pt hoping to order her other meals and wanted \"plain\" items such as soup, toast, crackers, etc. Pt reported her daughter is bringing her some soup soon for lunch today. Pt also reported she does not like meat as it is hard to get down her throat sometimes. Pt states she stays away from certain meats because of this. Unsure of additional information as pt falling asleep.  Vitamin/Herbal Supplement Use: MVI gummy bear daily  Food Allergies/Intolerances:  None  GI Symptoms: None Pt reported a soft stool today, but not like the diarrhea she had the past week. Pt stated she feels her BMs have improved " "today.  Oral Problems: Swallowing difficulty Pt reported swallowing difficulty with meats, but not other food items. Pt reports feeling meat gets caught in her throat, but she doesn't experience coughing with eating or drinking. Pt states she just doesn't eat certain meats because of this.        Anthropometrics:  Height: 160 cm (5' 2.99\")   Weight: 71.7 kg (158 lb 1.1 oz)   BMI (Calculated): 28.01  IBW/kg (Dietitian Calculated): 52.3 kg          Weight History:   Wt Readings from Last 10 Encounters:   12/12/24 71.7 kg (158 lb 1.1 oz) <---Stated   06/10/24 71.7 kg (158 lb)   05/06/24 74.8 kg (165 lb)   02/19/24 72.6 kg (160 lb)   05/25/23 73.1 kg (161 lb 2 oz)   05/09/23 73.6 kg (162 lb 3 oz)   03/24/22 74.2 kg (163 lb 8 oz)      Weight Change %:   Per chart, pt experience a non-significant wt loss of 7 lb or 4.4% over the past several months (May-December 2024). Pt reported her typical weight since October, before last week was 150 lb, but she weighed herself last Sunday and was 145 lb. Pt with possible significant wt loss of 5 or more pounds or 3.2% over the past week likely due to illness, poor PO and diarrhea. Unable to verify due to limited weight history in EMR.    Nutrition Focused Physical Exam Findings:  Deferred certain areas due to pt experiencing pain with touch; pt appears well-nourished.  Subcutaneous Fat Loss:   Orbital Fat Pads: Well nourished (slightly bulging fat pads)  Buccal Fat Pads: Well nourished (full, rounded cheeks)  Triceps: Well nourished (ample fat tissue)  Ribs: Defer  Muscle Wasting:  Temporalis: Well nourished (well-defined muscle)  Pectoralis (Clavicular Region): Well nourished (clavicle not visible)  Deltoid/Trapezius: Well nourished (rounded appearance at arm, shoulder, neck)  Interosseous: Well nourished (muscle bulges)  Trapezius/Infraspinatus/Supraspinatus (Scapular Region): Defer  Quadriceps: Defer  Gastrocnemius: Defer  Edema:  Edema: none  Physical Findings:  Skin: " "Negative    Nutrition Significant Labs:  CBC Trend:   Results from last 7 days   Lab Units 12/12/24  0602 12/11/24  1027   WBC AUTO x10*3/uL 23.5* 20.2*   RBC AUTO x10*6/uL 3.12* 3.04*   HEMOGLOBIN g/dL 6.8* 6.3*   HEMATOCRIT % 24.0* 20.1*   MCV fL 77* 66*   PLATELETS AUTO x10*3/uL 267 367    , BMP Trend:   Results from last 7 days   Lab Units 12/12/24  0602 12/11/24 2016 12/11/24  1027   GLUCOSE mg/dL 128* 133* 174*   CALCIUM mg/dL 8.8 8.1* 9.5   SODIUM mmol/L 138 134* 136   POTASSIUM mmol/L 3.2* 2.9* 2.9*   CO2 mmol/L 19* 20* 16*   CHLORIDE mmol/L 107 104 100   BUN mg/dL 41* 41* 43*   CREATININE mg/dL 1.60* 1.73* 1.79*    , BG POCT trend:   Results from last 7 days   Lab Units 12/12/24  0606   POCT GLUCOSE mg/dL 119*    , Renal Lab Trend:   Results from last 7 days   Lab Units 12/12/24  0602 12/11/24 2016 12/11/24  1027   POTASSIUM mmol/L 3.2* 2.9* 2.9*   PHOSPHORUS mg/dL  --  2.7  --    SODIUM mmol/L 138 134* 136   MAGNESIUM mg/dL  --   --  1.95   EGFR mL/min/1.73m*2 34* 31* 29*   BUN mg/dL 41* 41* 43*   CREATININE mg/dL 1.60* 1.73* 1.79*    , Lipid Panel: No results found for: \"CHOL\", \"HDL\", \"CHHDL\", \"LDLF\", \"VLDL\", \"TRIG\" , Vit D: No results found for: \"VITD25\" , Vit B12:   Lab Results   Component Value Date    [id number] 760 09/06/2023    , Iron Panel:   Lab Results   Component Value Date    IRON 24 (L) 09/06/2023    TIBC >474 (A) 09/06/2023        Nutrition Specific Medications:  Scheduled medications  [Held by provider] amLODIPine, 5 mg, oral, Daily  azithromycin, 500 mg, oral, q24h DOUGLAS  [Held by provider] hydroCHLOROthiazide, 25 mg, oral, Daily  ipratropium-albuteroL, 3 mL, nebulization, q6h  [Held by provider] lisinopril, 20 mg, oral, Daily  piperacillin-tazobactam, 3.375 g, intravenous, q6h  vancomycin, 125 mg, oral, 4x daily      Continuous medications     PRN medications  PRN medications: guaiFENesin     I/O:    ;      Dietary Orders (From admission, onward)       Start     Ordered    12/12/24 0159  " May Participate in Room Service  ( ROOM SERVICE MAY PARTICIPATE)  Once        Question:  .  Answer:  Yes    12/12/24 0158    12/11/24 1622  Adult diet Regular, 2-3 grams sodium  Diet effective now        Question Answer Comment   Diet type Regular    Diet type 2-3 grams sodium        12/11/24 1621                     Estimated Needs:   Total Energy Estimated Needs (kCal): 1540 kCal  Method for Estimating Needs: MSJ * 1.3  Total Protein Estimated Needs (g):  (70-80)  Method for Estimating Needs: 1.3-1.5 g/kg IBW  Total Fluid Estimated Needs (mL):  (per team)      Nutrition Diagnosis   Malnutrition Diagnosis  Patient has Malnutrition Diagnosis: Suspect malnutrition due to poor PO, though unable to fully determine due to limited weight history in EMR.    Nutrition Diagnosis  Patient has Nutrition Diagnosis: Yes  Diagnosis Status (1): New  Nutrition Diagnosis 1: Inadequate oral intake  Related to (1): lack of appetite  As Evidenced by (1): Pt reported very little PO over the past week due to nausea and illness.       Nutrition Interventions/Recommendations         Nutrition Prescription:  Individualized Nutrition Prescription Provided for : PO + ONS        Nutrition Interventions:   Interventions: Meals and snacks, Medical food supplement  Meals and Snacks: General healthful diet  Liberalize diet to Regular in attempt to promote PO   Pt able to determine and avoid certain meats that are harder for her to swallow. No other foods cause her swallowing difficulties. Recommend consulting SLP for formal eval if any issues arise.   Medical Food Supplement: Commercial beverage  Ordered Ensure Plus BID (350 kcal, 13g protein each)   Ordered Ensure HP daily (160 kcal, 16g protein each)  Obtain pt current weight (standing scale preferred) as indicated by c/f significant wt loss and risk for malnutrition    Nutrition Education:   Discussed with pt increasing PO and protein intake through ONS. Discussed with pt ordering snacks  between meals to increase PO.        Nutrition Monitoring and Evaluation   Food/Nutrient Related History Monitoring  Monitoring and Evaluation Plan: Energy intake  Energy Intake: Estimated energy intake  Criteria: Pt will meet >75% EEN and protein needs via diet + ONS    Body Composition/Growth/Weight History  Monitoring and Evaluation Plan: Weight  Criteria: Prevent weight loss    Biochemical Data, Medical Tests and Procedures  Monitoring and Evaluation Plan: Electrolyte/renal panel  Electrolyte and Renal Panel: Phosphorus, Potassium  Criteria: Lytes WNL         Time Spent (min): 90 minutes

## 2024-12-12 NOTE — PROGRESS NOTES
12/12/24 1417   Discharge Planning   Living Arrangements Alone   Support Systems Family members   Assistance Needed uses a walker as needed   Type of Residence Private residence   Home or Post Acute Services In home services   Expected Discharge Disposition Home H   Does the patient need discharge transport arranged? No   Financial Resource Strain   How hard is it for you to pay for the very basics like food, housing, medical care, and heating? Somewhat  (patient is requesting assistance for utilities)   Housing Stability   In the last 12 months, was there a time when you were not able to pay the mortgage or rent on time? N   In the past 12 months, how many times have you moved where you were living? 1   At any time in the past 12 months, were you homeless or living in a shelter (including now)? N   Transportation Needs   In the past 12 months, has lack of transportation kept you from medical appointments or from getting medications? no   In the past 12 months, has lack of transportation kept you from meetings, work, or from getting things needed for daily living? No     Plan per Medical/Surgical team: Patient is admitted for pneumonia and pyelonephritis. Patient is also positive for c diff. Patient started on antibiotics.    Assessment Note: Patient stated she lives alone. She has support from her children. She uses a walker as needed. PT/OT to evaluate for discharge needs. Will consult the [employer] for utility assistance. Family will provide transportation home.    Home Care: denies  PCP: Dr. Alexia Kelly  Pharmacy: [facility]  DME: walker  Falls: denies  Dialysis: denies    Potential Barriers: none  Discharge Disposition: [facility]?  ADOD: 2-4 days

## 2024-12-13 ENCOUNTER — APPOINTMENT (OUTPATIENT)
Dept: CARDIOLOGY | Facility: HOSPITAL | Age: 74
DRG: 871 | End: 2024-12-13
Payer: MEDICARE

## 2024-12-13 LAB
ALBUMIN SERPL BCP-MCNC: 2.9 G/DL (ref 3.4–5)
ANION GAP SERPL CALC-SCNC: 11 MMOL/L (ref 10–20)
AORTIC VALVE PEAK VELOCITY: 1.76 M/S
AV PEAK GRADIENT: 12 MMHG
AVA (PEAK VEL): 2.68 CM2
BASOPHILS # BLD AUTO: 0.05 X10*3/UL (ref 0–0.1)
BASOPHILS NFR BLD AUTO: 0.2 %
BLOOD EXPIRATION DATE: NORMAL
BUN SERPL-MCNC: 21 MG/DL (ref 6–23)
CALCIUM SERPL-MCNC: 8.8 MG/DL (ref 8.6–10.6)
CHLORIDE SERPL-SCNC: 108 MMOL/L (ref 98–107)
CO2 SERPL-SCNC: 22 MMOL/L (ref 21–32)
CREAT SERPL-MCNC: 1.19 MG/DL (ref 0.5–1.05)
DISPENSE STATUS: NORMAL
EGFRCR SERPLBLD CKD-EPI 2021: 48 ML/MIN/1.73M*2
EJECTION FRACTION APICAL 4 CHAMBER: 65.5
EJECTION FRACTION: 66 %
EOSINOPHIL # BLD AUTO: 0.03 X10*3/UL (ref 0–0.4)
EOSINOPHIL NFR BLD AUTO: 0.1 %
ERYTHROCYTE [DISTWIDTH] IN BLOOD BY AUTOMATED COUNT: 18.6 % (ref 11.5–14.5)
GLUCOSE SERPL-MCNC: 105 MG/DL (ref 74–99)
HCT VFR BLD AUTO: 27.1 % (ref 36–46)
HGB BLD-MCNC: 8.4 G/DL (ref 12–16)
IMM GRANULOCYTES # BLD AUTO: 0.26 X10*3/UL (ref 0–0.5)
IMM GRANULOCYTES NFR BLD AUTO: 1.2 % (ref 0–0.9)
LACTATE SERPL-SCNC: 2 MMOL/L (ref 0.4–2)
LACTATE SERPL-SCNC: 3.3 MMOL/L (ref 0.4–2)
LEFT ATRIUM VOLUME AREA LENGTH INDEX BSA: 38.7 ML/M2
LEFT VENTRICLE INTERNAL DIMENSION DIASTOLE: 4.4 CM (ref 3.5–6)
LEFT VENTRICULAR OUTFLOW TRACT DIAMETER: 2 CM
LYMPHOCYTES # BLD AUTO: 1.72 X10*3/UL (ref 0.8–3)
LYMPHOCYTES NFR BLD AUTO: 8.1 %
MAGNESIUM SERPL-MCNC: 2.1 MG/DL (ref 1.6–2.4)
MCH RBC QN AUTO: 23.1 PG (ref 26–34)
MCHC RBC AUTO-ENTMCNC: 31 G/DL (ref 32–36)
MCV RBC AUTO: 75 FL (ref 80–100)
MITRAL VALVE E/A RATIO: 0.7
MONOCYTES # BLD AUTO: 1.38 X10*3/UL (ref 0.05–0.8)
MONOCYTES NFR BLD AUTO: 6.5 %
NEUTROPHILS # BLD AUTO: 17.77 X10*3/UL (ref 1.6–5.5)
NEUTROPHILS NFR BLD AUTO: 83.9 %
NRBC BLD-RTO: 0.1 /100 WBCS (ref 0–0)
PHOSPHATE SERPL-MCNC: 2.7 MG/DL (ref 2.5–4.9)
PLATELET # BLD AUTO: 335 X10*3/UL (ref 150–450)
POTASSIUM SERPL-SCNC: 3.3 MMOL/L (ref 3.5–5.3)
PRODUCT BLOOD TYPE: 5100
PRODUCT CODE: NORMAL
RBC # BLD AUTO: 3.64 X10*6/UL (ref 4–5.2)
RIGHT VENTRICLE FREE WALL PEAK S': 12.6 CM/S
RIGHT VENTRICLE PEAK SYSTOLIC PRESSURE: 47.6 MMHG
SODIUM SERPL-SCNC: 138 MMOL/L (ref 136–145)
TRICUSPID ANNULAR PLANE SYSTOLIC EXCURSION: 1.8 CM
TSH SERPL-ACNC: 1.8 MIU/L (ref 0.44–3.98)
UNIT ABO: NORMAL
UNIT NUMBER: NORMAL
UNIT RH: NORMAL
UNIT VOLUME: 335
WBC # BLD AUTO: 21.2 X10*3/UL (ref 4.4–11.3)
XM INTEP: NORMAL

## 2024-12-13 PROCEDURE — 2500000002 HC RX 250 W HCPCS SELF ADMINISTERED DRUGS (ALT 637 FOR MEDICARE OP, ALT 636 FOR OP/ED)

## 2024-12-13 PROCEDURE — 84443 ASSAY THYROID STIM HORMONE: CPT

## 2024-12-13 PROCEDURE — 85025 COMPLETE CBC W/AUTO DIFF WBC: CPT | Performed by: STUDENT IN AN ORGANIZED HEALTH CARE EDUCATION/TRAINING PROGRAM

## 2024-12-13 PROCEDURE — 83605 ASSAY OF LACTIC ACID: CPT

## 2024-12-13 PROCEDURE — 2500000001 HC RX 250 WO HCPCS SELF ADMINISTERED DRUGS (ALT 637 FOR MEDICARE OP)

## 2024-12-13 PROCEDURE — 93306 TTE W/DOPPLER COMPLETE: CPT

## 2024-12-13 PROCEDURE — 99233 SBSQ HOSP IP/OBS HIGH 50: CPT | Performed by: STUDENT IN AN ORGANIZED HEALTH CARE EDUCATION/TRAINING PROGRAM

## 2024-12-13 PROCEDURE — 2500000004 HC RX 250 GENERAL PHARMACY W/ HCPCS (ALT 636 FOR OP/ED)

## 2024-12-13 PROCEDURE — 87040 BLOOD CULTURE FOR BACTERIA: CPT

## 2024-12-13 PROCEDURE — 1200000002 HC GENERAL ROOM WITH TELEMETRY DAILY

## 2024-12-13 PROCEDURE — 84100 ASSAY OF PHOSPHORUS: CPT | Performed by: STUDENT IN AN ORGANIZED HEALTH CARE EDUCATION/TRAINING PROGRAM

## 2024-12-13 PROCEDURE — 36415 COLL VENOUS BLD VENIPUNCTURE: CPT | Performed by: STUDENT IN AN ORGANIZED HEALTH CARE EDUCATION/TRAINING PROGRAM

## 2024-12-13 PROCEDURE — 36415 COLL VENOUS BLD VENIPUNCTURE: CPT

## 2024-12-13 PROCEDURE — 83735 ASSAY OF MAGNESIUM: CPT | Performed by: STUDENT IN AN ORGANIZED HEALTH CARE EDUCATION/TRAINING PROGRAM

## 2024-12-13 PROCEDURE — 93306 TTE W/DOPPLER COMPLETE: CPT | Performed by: INTERNAL MEDICINE

## 2024-12-13 RX ORDER — POTASSIUM CHLORIDE 20 MEQ/1
40 TABLET, EXTENDED RELEASE ORAL ONCE
Status: COMPLETED | OUTPATIENT
Start: 2024-12-13 | End: 2024-12-13

## 2024-12-13 RX ORDER — ACETAMINOPHEN 650 MG/1
650 SUPPOSITORY RECTAL EVERY 4 HOURS PRN
Status: DISCONTINUED | OUTPATIENT
Start: 2024-12-13 | End: 2024-12-19 | Stop reason: HOSPADM

## 2024-12-13 RX ORDER — ACETAMINOPHEN 160 MG/5ML
650 SOLUTION ORAL EVERY 4 HOURS PRN
Status: DISCONTINUED | OUTPATIENT
Start: 2024-12-13 | End: 2024-12-19 | Stop reason: HOSPADM

## 2024-12-13 RX ORDER — MEROPENEM AND SODIUM CHLORIDE 1 G/50ML
1 INJECTION, SOLUTION INTRAVENOUS EVERY 12 HOURS
Status: DISCONTINUED | OUTPATIENT
Start: 2024-12-13 | End: 2024-12-13

## 2024-12-13 RX ORDER — ACETAMINOPHEN 325 MG/1
650 TABLET ORAL EVERY 4 HOURS PRN
Status: DISCONTINUED | OUTPATIENT
Start: 2024-12-13 | End: 2024-12-19 | Stop reason: HOSPADM

## 2024-12-13 RX ORDER — MEROPENEM AND SODIUM CHLORIDE 1 G/50ML
1 INJECTION, SOLUTION INTRAVENOUS 3 TIMES DAILY
Status: DISCONTINUED | OUTPATIENT
Start: 2024-12-13 | End: 2024-12-16

## 2024-12-13 ASSESSMENT — COGNITIVE AND FUNCTIONAL STATUS - GENERAL
MOVING FROM LYING ON BACK TO SITTING ON SIDE OF FLAT BED WITH BEDRAILS: A LITTLE
MOVING TO AND FROM BED TO CHAIR: A LITTLE
CLIMB 3 TO 5 STEPS WITH RAILING: A LITTLE
WALKING IN HOSPITAL ROOM: A LITTLE
MOBILITY SCORE: 18
STANDING UP FROM CHAIR USING ARMS: A LITTLE
TURNING FROM BACK TO SIDE WHILE IN FLAT BAD: A LITTLE
DAILY ACTIVITIY SCORE: 24

## 2024-12-13 ASSESSMENT — PAIN SCALES - GENERAL
PAINLEVEL_OUTOF10: 0 - NO PAIN

## 2024-12-13 NOTE — CARE PLAN
The patient's goals for the shift include Patient would like to feel better    The clinical goals for the shift include Patient remain safe during the shift.      Problem: Nutrition  Goal: Less than 5 days NPO/clear liquids  Outcome: Met  Goal: Oral intake greater than 50%  Outcome: Progressing  Goal: Oral intake greater 75%  Outcome: Progressing  Goal: Consume prescribed supplement  Outcome: Progressing  Goal: Adequate PO fluid intake  Outcome: Progressing  Goal: Nutrition support is meeting 75% of nutrient needs  Outcome: Progressing  Goal: Tube feed tolerance  Outcome: Progressing  Goal: BG  mg/dL  Outcome: Met  Goal: Lab values WNL  Outcome: Progressing  Goal: Electrolytes WNL  Outcome: Progressing  Goal: Maintain stable weight  Outcome: Progressing  Goal: Reduce weight from edema/fluid  Outcome: Progressing  Goal: Gradual weight gain  Outcome: Progressing  Goal: Improve ostomy output  Outcome: Progressing

## 2024-12-13 NOTE — SIGNIFICANT EVENT
Rapid Response Nurse Note: RADAR alert: 8    Pager time:   Arrival time:   Event end time:   Location: [facility] 60  [] Triage by phone or secure messaging    Rapid response initiated by:  [] Rapid response RN [] Family [] Nursing Supervisor [] Physician   [x] RADAR auto page [] Sepsis auto-page [] RN [] RT   [] NP/PA [] Other:     Primary reason for call:   [] BAT [] New CPAP/BiPAP [] Bleeding [] Change in mental status   [] Chest pain [] Code blue [] FiO2 >/= 50% [] HR </= 40 bpm   [] HR >/= 130 bpm [] Hyperglycemia [] Hypoglycemia [x] RADAR    [] RR </= 8 bpm [] RR >/= 30 bpm [] SBP </= 90 mmHg [] SpO2 < 90%   [] Seizure [] Sepsis [] Shortness of breath  [] Staff concern: see comments     Initial VS and/or RADAR VS: T 39.2 °C; ; RR 18; /84; SPO2 93%.    Interventions:  [x] None [] ABG/VBG [] Assist w/ICU transfer [] BAT paged    [] Bag mask [] Blood [] Cardioversion [] Code Blue   [] Code blue for intubation [] Code status changed [] Chest x-ray [] EKG   [] IV fluid/bolus [] KUB x-ray [] Labs/cultures [] Medication   [] Nebulizer treatment [] NIPPV (CPAP/BiPAP) [] Oxygen [] Oral airway   [] Peripheral IV [] Palliative care consult [] CT/MRI [] Sepsis protocol    [] Suctioned [] Other:     Outcome:  [] Coded and  [] Code blue for intubation [] Coded and transferred to ICU []  on division   [x] Remained on division (no change) [] Remained on division + additional monitoring [] Remained in ED [] Transferred to ED   [] Transferred to ICU [] Transferred to inpatient status [] Transferred for interventions (procedure) [] Transferred to ICU stepdown    [] Transferred to surgery [] Transferred to telemetry [] Sepsis protocol [] STEMI protocol   [] Stroke protocol [x] Bedside nurse instructed to page rapid response for any concerns or acute change in condition/VS     Additional Comments: RADAR alert received by Rapid Response Team RN.  VS reviewed with Bedside RN Memo.  Per RN  Memo, patient received septic work up earlier in shift.  No interventions required of Rapid Response at present time.  RN encouraged to page Rapid Response if further concerns in patient condition arise.

## 2024-12-13 NOTE — CARE PLAN
The patient's goals for the shift include Patient would like to feel better    The clinical goals for the shift include Patient to remain free of harm    Over the shift, the patient did not make progress toward the following goals. Barriers to progression include     Problem: Safety - Adult  Goal: Free from fall injury  Outcome: Progressing     Problem: Chronic Conditions and Co-morbidities  Goal: Patient's chronic conditions and co-morbidity symptoms are monitored and maintained or improved  Outcome: Progressing

## 2024-12-13 NOTE — PROGRESS NOTES
Megan Holt is a 74 y.o. female on day 2 of admission presenting with Community acquired pneumonia, unspecified laterality.      Subjective   No acute overnight events. She reports fever and chills this morning. No vomiting or worsening diarrhea.        Objective     Last Recorded Vitals  /77   Pulse 92   Temp 36.5 °C (97.7 °F)   Resp 18   Wt 71.7 kg (158 lb 1.1 oz)   SpO2 96%   Intake/Output last 3 Shifts:    Intake/Output Summary (Last 24 hours) at 12/13/2024 1341  Last data filed at 12/13/2024 1321  Gross per 24 hour   Intake 850 ml   Output --   Net 850 ml       Admission Weight  Weight: 71.7 kg (158 lb) (12/11/24 1018)    Daily Weight  12/12/24 : 71.7 kg (158 lb 1.1 oz)    Image Results  ECG 12 lead  Sinus tachycardia  Nonspecific ST and T wave abnormality  Abnormal ECG  When compared with ECG of 16-APR-2018 13:10,  Vent. rate has increased BY  71 BPM  ST now depressed in Lateral leads    See ED provider note for full interpretation and clinical correlation  Confirmed by Jennifer Grant ([id number]) on 12/11/2024 8:46:11 PM  CT abdomen pelvis w IV contrast  Narrative: Interpreted By:  Cuate Mulligan and Korakavi Nisha   STUDY:  CT ABDOMEN PELVIS W IV CONTRAST;  12/11/2024 1:31 pm      INDICATION:  Signs/Symptoms:sepsis, recent appy.          COMPARISON:  CT chest 06/27/2023.      ACCESSION NUMBER(S):  [id number]      ORDERING CLINICIAN:  AWILDA JAIME      TECHNIQUE:  CT of the abdomen and pelvis was performed.  Standard contiguous  axial images were obtained at 3 mm slice thickness through the  abdomen and pelvis. Coronal and sagittal reconstructions at 3 mm  slice thickness were performed.  90 ML of Omnipaque 350 was  administered intravenously without immediate complication.      FINDINGS:  LOWER CHEST:  New ground-glass opacities in the right-greater-than-left posterior  bilateral lower lobes when compared to CT chest 06/27/2023. However  recommend looking at same day CTA chest for PE for full  examination  findings.      No pneumothorax or pleural effusion.      The heart is mildly enlarged in size with trace pericardial effusion.  Moderate coronary artery calcifications.      Visualized esophagus appears normal.      ABDOMEN:      LIVER:  The liver is normal in size. There is a too small to characterize  hypodense lesion in the left hepatic lobe this is statistically  favored represent a cyst. Mild hepatic steatosis.      BILE DUCTS:  The intrahepatic and extrahepatic ducts are not dilated.      GALLBLADDER:  There is a large radiopaque stone within the neck of the gallbladder.  There is mild gallbladder wall thickening.      PANCREAS:  Unremarkable      SPLEEN:  The spleen is normal in size and unremarkable.      ADRENAL GLANDS:  Bilateral adrenal glands appear normal.      KIDNEYS AND URETERS:  The kidneys are normal in size. There are patchy areas of  hypoenhancement in bilateral renal cortices and mild bilateral  perinephric edema. There are multiple bilateral cystic lesions in the  kidneys, the largest on the right in the superior pole measuring 2.7  cm and with a partially calcified rim, and the largest on the left in  the superior pole measuring 2.8 cm. No hydroureteronephrosis or  nephroureterolithiasis is identified.      PELVIS:      BLADDER:  Unremarkable.      REPRODUCTIVE ORGANS:  The uterus is present. There are multiple calcified lesions in the  uterus likely represent calcified leiomyomas.      BOWEL:  The stomach is unremarkable.   The small and large bowel are normal  in caliber and demonstrate no wall thickening. Sigmoidal colonic  diverticulosis without evidence of diverticulitis.    The appendix is  surgically absent.      VESSELS:  There is no aneurysmal dilatation of the abdominal aorta. Moderate  atherosclerotic disease of the abdominal aorta and its branches. The  IVC appears normal.      PERITONEUM/RETROPERITONEUM/LYMPH NODES:  No ascites or free air, no fluid collection.  No  abdominopelvic  lymphadenopathy is present.      BONES AND ABDOMINAL WALL:  No suspicious osseous lesions are identified. Mild multilevel  degenerative discogenic disease is noted in the lower thoracic and  lumbar spine.  The abdominal wall soft tissues appear normal.      Impression: 1.  Patchy areas of hypoenhancement in the bilateral renal cortices  mild bilateral perinephric edema suggestive of pyelonephritis. There  is no discrete walled fluid collection or obstructive  nephroureterolithiasis.  2. Incidental findings include mild hepatic steatosis, nonspecific  mild gallbladder wall thickening, and multiple calcified leiomyomas  in the uterus.  3. Mild cardiomegaly with trace pericardial effusion.      I personally reviewed the images/study and I agree with the findings  as stated by Resident Dr. Jyoti Malcolm MD. This study was  interpreted at [place], [place].      MACRO:  None      Signed by: Cuate Mulligan 12/11/2024 5:00 PM  Dictation workstation:   [id number]  CT angio chest for pulmonary embolism  Narrative: Interpreted By:  Rick Mcclelland and Stevens Alex   STUDY:  CT ANGIO CHEST FOR PULMONARY EMBOLISM;  12/11/2024 1:31 pm      INDICATION:  Signs/Symptoms:sob, hypoxic.      74-year-old female past medical history remote breast cancer status  post right breast mastectomy, hypertension, recent appendectomy on  10/04 status post appendicitis with perforation who presents to the  ED with flu-like symptoms that began yesterday. In the emergency  department patient is tachycardic with heart rates to the 150s and  hypoxic to the low 80s.      COMPARISON:  CT chest 06/27/2023, CT abdomen and pelvis 10/03/2024      ACCESSION NUMBER(S):  [id number]      ORDERING CLINICIAN:  AWILDA JAIME      TECHNIQUE:  Helical data acquisition of the chest was obtained after intravenous  administration of 90 ML Omnipaque 350, as per PE protocol. Images  were reformatted in  coronal and sagittal planes. Axial and coronal  maximum intensity projection (MIP) images were created and reviewed.      FINDINGS:  POTENTIAL LIMITATIONS OF THE STUDY: None      HEART AND VESSELS:  There are no discrete filling defects within main pulmonary artery  and its branches to suggest acute pulmonary embolism. Main pulmonary  artery and its branches are normal in caliber.      The thoracic aorta normal in course and caliber.There is moderate  atherosclerosis present, including calcified and noncalcified  plaques.Although, the study is not tailored for evaluation of aorta,  there is no definite evidence of acute aortic pathology. Moderate  coronary artery calcifications are seen. Please note,the study is not  optimized for evaluation of coronary arteries.      The cardiac chambers are not enlarged.There are no findings to  suggest right heart strain. There is no pericardial effusion seen.      MEDIASTINUM AND PORTILLO, LOWER NECK AND AXILLA:  Stable appearance of thyromegaly with right lobe heterogeneity.  No evidence of thoracic lymphadenopathy by CT criteria.  Esophagus appears within normal limits as seen.      LUNGS AND AIRWAYS:  The trachea and central airways are patent. No endobronchial lesion  is seen.      There are bilateral dependent opacities, which are felt to be  atelectatic in nature. There is diffuse bilateral interlobular septal  thickening, with an right-sided predominance. . There is a new 5 mm  pulmonary nodule within the right middle lobe (series 402, image  158). Other scattered pulmonary nodules, predominantly within the  right lobe, which are stable in size. Moderate centrilobular  predominant emphysema      UPPER ABDOMEN:  Partially visualized cholelithiasis similar in appearance compared to  prior examination; however, please refer to same-day CT abdomen and  pelvis for further characterization. Bilateral hypodense lesions  within the renal parenchyma which are consistent with benign  cysts.      CHEST WALL AND OSSEOUS STRUCTURES:  Chest wall is within normal limits.  No acute osseous pathology.There are no suspicious osseous lesions.      Impression: 1. No evidence of acute pulmonary embolism.  2. Diffuse bilateral interlobular septal thickening with asymmetric  prominence towards right. In part, findings may be secondary to low  lung volumes/poor inspiratory effort; however, pulmonary edema and/or  superimposed atypical infection cannot be excluded.  3. Moderate centrilobular predominant emphysema.  4. Interval development of a 5 mm pulmonary nodule within the right  middle lobe. Recommend follow-up CT of the chest in 6 months to  evaluate for stability  5. Other non-acute, chronic findings as described above.      I personally reviewed the images/study and I agree with the findings  as stated by Kaz Sousa DO PGY-2. This study was interpreted at  [place], [place].      MACRO:  None      Signed by: Rick Mcclelland 12/11/2024 3:31 PM  Dictation workstation:   [id number]  XR chest 1 view  Narrative: Interpreted By:  Idris Perkins,   STUDY:  XR CHEST 1 VIEW;  12/11/2024 10:40 am      INDICATION:  Signs/Symptoms:sepsis.          COMPARISON:  None.      ACCESSION NUMBER(S):  [id number]      ORDERING CLINICIAN:  LEWIS GALLOWAY      FINDINGS:                  CARDIOMEDIASTINAL SILHOUETTE:  Cardiomediastinal silhouette is normal in size and configuration.      LUNGS:  Increased lung markings bilaterally in a perihilar and biapical and  distribution. No effusion seen.      ABDOMEN:  No remarkable upper abdominal findings.      BONES:  No acute osseous changes.      Impression: 1. Increased lung markings bilaterally. Atypical/viral pneumonia is  highly differential. A component of mild edema may be present as well              MACRO:  None      Signed by: Idris Perkins 12/11/2024 10:59 AM  Dictation workstation:   [id number]      Physical  Exam  Constitutional:       Appearance: Normal appearance.   HENT:      Head: Normocephalic and atraumatic.      Mouth/Throat:      Mouth: Mucous membranes are moist.      Pharynx: Oropharynx is clear.   Eyes:      Extraocular Movements: Extraocular movements intact.      Pupils: Pupils are equal, round, and reactive to light.   Cardiovascular:      Rate and Rhythm: Normal rate and regular rhythm.      Pulses: Normal pulses.      Heart sounds: Normal heart sounds.   Pulmonary:      Effort: Pulmonary effort is normal. No respiratory distress.      Breath sounds: Normal breath sounds.   Abdominal:      General: Bowel sounds are normal. There is no distension.      Palpations: Abdomen is soft.      Tenderness: There is abdominal tenderness.      Comments: Upper abdominal tenderness to palpation   Skin:     General: Skin is warm and dry.   Neurological:      Mental Status: She is alert and oriented to person, place, and time.   Psychiatric:         Mood and Affect: Mood normal.         Behavior: Behavior normal.         Relevant Results  Results for orders placed or performed during the hospital encounter of 12/11/24 (from the past 24 hours)   CBC and Auto Differential   Result Value Ref Range    WBC 24.6 (H) 4.4 - 11.3 x10*3/uL    nRBC 0.1 (H) 0.0 - 0.0 /100 WBCs    RBC 3.71 (L) 4.00 - 5.20 x10*6/uL    Hemoglobin 8.6 (L) 12.0 - 16.0 g/dL    Hematocrit 27.0 (L) 36.0 - 46.0 %    MCV 73 (L) 80 - 100 fL    MCH 23.2 (L) 26.0 - 34.0 pg    MCHC 31.9 (L) 32.0 - 36.0 g/dL    RDW 18.4 (H) 11.5 - 14.5 %    Platelets 315 150 - 450 x10*3/uL    Neutrophils % 86.9 40.0 - 80.0 %    Immature Granulocytes %, Automated 1.1 (H) 0.0 - 0.9 %    Lymphocytes % 6.2 13.0 - 44.0 %    Monocytes % 5.6 2.0 - 10.0 %    Eosinophils % 0.0 0.0 - 6.0 %    Basophils % 0.2 0.0 - 2.0 %    Neutrophils Absolute 21.37 (H) 1.60 - 5.50 x10*3/uL    Immature Granulocytes Absolute, Automated 0.26 0.00 - 0.50 x10*3/uL    Lymphocytes Absolute 1.53 0.80 - 3.00  x10*3/uL    Monocytes Absolute 1.38 (H) 0.05 - 0.80 x10*3/uL    Eosinophils Absolute 0.01 0.00 - 0.40 x10*3/uL    Basophils Absolute 0.04 0.00 - 0.10 x10*3/uL   Renal function panel   Result Value Ref Range    Glucose 126 (H) 74 - 99 mg/dL    Sodium 138 136 - 145 mmol/L    Potassium 2.8 (LL) 3.5 - 5.3 mmol/L    Chloride 107 98 - 107 mmol/L    Bicarbonate 22 21 - 32 mmol/L    Anion Gap 12 10 - 20 mmol/L    Urea Nitrogen 29 (H) 6 - 23 mg/dL    Creatinine 1.24 (H) 0.50 - 1.05 mg/dL    eGFR 46 (L) >60 mL/min/1.73m*2    Calcium 8.7 8.6 - 10.6 mg/dL    Phosphorus 2.1 (L) 2.5 - 4.9 mg/dL    Albumin 3.0 (L) 3.4 - 5.0 g/dL   CBC and Auto Differential   Result Value Ref Range    WBC 21.2 (H) 4.4 - 11.3 x10*3/uL    nRBC 0.1 (H) 0.0 - 0.0 /100 WBCs    RBC 3.64 (L) 4.00 - 5.20 x10*6/uL    Hemoglobin 8.4 (L) 12.0 - 16.0 g/dL    Hematocrit 27.1 (L) 36.0 - 46.0 %    MCV 75 (L) 80 - 100 fL    MCH 23.1 (L) 26.0 - 34.0 pg    MCHC 31.0 (L) 32.0 - 36.0 g/dL    RDW 18.6 (H) 11.5 - 14.5 %    Platelets 335 150 - 450 x10*3/uL    Neutrophils % 83.9 40.0 - 80.0 %    Immature Granulocytes %, Automated 1.2 (H) 0.0 - 0.9 %    Lymphocytes % 8.1 13.0 - 44.0 %    Monocytes % 6.5 2.0 - 10.0 %    Eosinophils % 0.1 0.0 - 6.0 %    Basophils % 0.2 0.0 - 2.0 %    Neutrophils Absolute 17.77 (H) 1.60 - 5.50 x10*3/uL    Immature Granulocytes Absolute, Automated 0.26 0.00 - 0.50 x10*3/uL    Lymphocytes Absolute 1.72 0.80 - 3.00 x10*3/uL    Monocytes Absolute 1.38 (H) 0.05 - 0.80 x10*3/uL    Eosinophils Absolute 0.03 0.00 - 0.40 x10*3/uL    Basophils Absolute 0.05 0.00 - 0.10 x10*3/uL   Renal function panel   Result Value Ref Range    Glucose 105 (H) 74 - 99 mg/dL    Sodium 138 136 - 145 mmol/L    Potassium 3.3 (L) 3.5 - 5.3 mmol/L    Chloride 108 (H) 98 - 107 mmol/L    Bicarbonate 22 21 - 32 mmol/L    Anion Gap 11 10 - 20 mmol/L    Urea Nitrogen 21 6 - 23 mg/dL    Creatinine 1.19 (H) 0.50 - 1.05 mg/dL    eGFR 48 (L) >60 mL/min/1.73m*2    Calcium 8.8 8.6 -  10.6 mg/dL    Phosphorus 2.7 2.5 - 4.9 mg/dL    Albumin 2.9 (L) 3.4 - 5.0 g/dL   Magnesium   Result Value Ref Range    Magnesium 2.10 1.60 - 2.40 mg/dL   TSH with reflex to Free T4 if abnormal   Result Value Ref Range    Thyroid Stimulating Hormone 1.80 0.44 - 3.98 mIU/L   Lactate   Result Value Ref Range    Lactate 3.3 (H) 0.4 - 2.0 mmol/L        Scheduled medications  [Held by provider] amLODIPine, 5 mg, oral, Daily  [Held by provider] hydroCHLOROthiazide, 25 mg, oral, Daily  ipratropium-albuteroL, 3 mL, nebulization, TID  [Held by provider] lisinopril, 20 mg, oral, Daily  piperacillin-tazobactam, 3.375 g, intravenous, q6h  vancomycin, 125 mg, oral, 4x daily      Continuous medications     PRN medications  PRN medications: guaiFENesin       Assessment/Plan      Megan Holt is a 74 y.o. female with a PMHx of pulmonary nodules, tobacco use, HTN, R breast cancer (s/p mastectomy 1993, s/p tamoxifen for 5 years), and recent appendicitis (s/p appendectomy 10/4/24) presenting with urinary incontinence, cough, diarrhea and shortness of breath. Imaging demonstrates likely underlying pneumonia and pyelonephritis. Labs also showing JAYDE and microcytic anemia.     Patient was febrile overnight. Blood culture susceptibility panel completed today showing E. Coli resistant to Zosyn, and susceptible to meropenem. Will switch to meropenem.    Updates 12/13:  -Continue azithromycin, meropenem and oral vancomycin  -Repeat blood cultures ordered  -Lactate ordered  -Will follow up infectious workup  -Will follow up hemolysis labs  -Can consider IV fluid bolus if low blood pressures and poor PO fluid intake  -TTE ordered    #E. coli bacteremia  #Sepsis  ::Both blood cultures positive for E. coli  ::Possible sources include urinary tract or colon  ::Susceptibility panel: Resistant to Zosyn, cefepime, etc.. Susceptible to meropenem, ertapenem, Bactrim, and amkiacin  ::Lactate 6.8>0.4>3.3  ::Urine culture negative  -Zosyn switched to  meropenem 1g q12h based on susceptibility panel  -Can consider IV fluid bolus if low blood pressures and poor PO fluid intake  -TTE ordered     #Pneumonia  ::1 week history of non-productive cough  ::Chest x-ray: increased lung markings bilaterally  ::CT angio chest: diffuse bilateral interlobular septal thickening with asymmetric prominence towards right   ::Elevated WBC of 20.2 on admission (neutrophil predominant)  ::Flu/Covid/RSV, MRSA negative; Strep pneumo, legionella negative  ::Lactate 6.8>0.4 following IV fluid bolus  -Sputum, blood, and urine cultures ordered  -Currently on meropenem and azithromycin  -PRN guaifenesin  -DuoNeb q6h     #Pyelonephritis  ::CT A/P w/ contrast: Patchy areas of hypoenhancement in the bilateral renal cortices mild bilateral perinephric edema suggestive of pyelonephritis   ::UA positive for leukocyte esterase  ::Urine culture negative  -Currently on meropenem and azithromycin     #Microcytic anemia  ::Hgb 6.3, MCV 66 on admission  ::Patient not reporting recent history of bleeds. However, states that she hasn't recently taken her iron supplement  ::Patient reporting no recent colonoscopy (hx of positive Cologuard in 2020)  ::  ::S/p 2 units pRBCs  -Hemolysis labs pending  -Will need outpatient colonoscopy     #Diarrhea  #C diff positive  ::1 week hx of diarrhea  ::S/p 2.25L IV fluids in ED  ::C diff PCR positive, toxin negative   ::Stool pathogen panel negative  -Added oral vancomycin for C diff     #JAYDE  ::Cr 1.79 (vs baseline Cr 1.0) > 1.19 (12/13)  ::S/p 2.25L IV fluids in ED   ::Patient required IV contrast for workup of sepsis in ED  -Daily RFPs     #Hypokalemia  ::K 2.9 on admission  ::Given oral potassium chloride in ED  -Daily RFPs  -Continue to replete as needed     #Elevated troponin  ::Troponin 184>603>105  ::EKG showed sinus tachycardia  ::  ::Likely related to demand ischemia iso hypotension     #HTN  ::Home meds: amlodipine 5 mg daily, lisinopril 20 mg  daily, hydrochlorothiazide 25 mg daily  -Holding home antihypertensives due to drop in blood pressure while in ED     #Pulmonary nodules  ::Patient has a history of pulmonary nodules  ::New 5 mm nodule noted in R lung  -Repeat CT chest in 6 months recommended     F: PRN  E: PRN  N: Regular diet  A: PIV  DVT ppx: SCDs  Bowel ppx: Miralax     Code Status: Full Code  Emergency contact: LEODAN ALAMO (Daughter) [phone]            Queen GERRY Treviño MD

## 2024-12-14 LAB
ALBUMIN SERPL BCP-MCNC: 2.9 G/DL (ref 3.4–5)
ANION GAP SERPL CALC-SCNC: 13 MMOL/L (ref 10–20)
BACTERIA BLD AEROBE CULT: ABNORMAL
BACTERIA BLD AEROBE CULT: ABNORMAL
BACTERIA BLD CULT: ABNORMAL
BACTERIA BLD CULT: ABNORMAL
BASOPHILS # BLD AUTO: 0.05 X10*3/UL (ref 0–0.1)
BASOPHILS NFR BLD AUTO: 0.3 %
BUN SERPL-MCNC: 13 MG/DL (ref 6–23)
CALCIUM SERPL-MCNC: 8.8 MG/DL (ref 8.6–10.6)
CHLORIDE SERPL-SCNC: 109 MMOL/L (ref 98–107)
CO2 SERPL-SCNC: 21 MMOL/L (ref 21–32)
CREAT SERPL-MCNC: 0.89 MG/DL (ref 0.5–1.05)
EGFRCR SERPLBLD CKD-EPI 2021: 68 ML/MIN/1.73M*2
EOSINOPHIL # BLD AUTO: 0.02 X10*3/UL (ref 0–0.4)
EOSINOPHIL NFR BLD AUTO: 0.1 %
ERYTHROCYTE [DISTWIDTH] IN BLOOD BY AUTOMATED COUNT: 19.8 % (ref 11.5–14.5)
GLUCOSE SERPL-MCNC: 100 MG/DL (ref 74–99)
GRAM STN SPEC: ABNORMAL
HCT VFR BLD AUTO: 25.7 % (ref 36–46)
HGB BLD-MCNC: 7.8 G/DL (ref 12–16)
IMM GRANULOCYTES # BLD AUTO: 0.23 X10*3/UL (ref 0–0.5)
IMM GRANULOCYTES NFR BLD AUTO: 1.4 % (ref 0–0.9)
LACTATE SERPL-SCNC: 0.8 MMOL/L (ref 0.4–2)
LYMPHOCYTES # BLD AUTO: 1.02 X10*3/UL (ref 0.8–3)
LYMPHOCYTES NFR BLD AUTO: 6.4 %
MAGNESIUM SERPL-MCNC: 1.82 MG/DL (ref 1.6–2.4)
MCH RBC QN AUTO: 22.5 PG (ref 26–34)
MCHC RBC AUTO-ENTMCNC: 30.4 G/DL (ref 32–36)
MCV RBC AUTO: 74 FL (ref 80–100)
MONOCYTES # BLD AUTO: 0.74 X10*3/UL (ref 0.05–0.8)
MONOCYTES NFR BLD AUTO: 4.6 %
NEUTROPHILS # BLD AUTO: 13.97 X10*3/UL (ref 1.6–5.5)
NEUTROPHILS NFR BLD AUTO: 87.2 %
NRBC BLD-RTO: 0 /100 WBCS (ref 0–0)
PHOSPHATE SERPL-MCNC: 2.3 MG/DL (ref 2.5–4.9)
PLATELET # BLD AUTO: 344 X10*3/UL (ref 150–450)
POTASSIUM SERPL-SCNC: 3.6 MMOL/L (ref 3.5–5.3)
RBC # BLD AUTO: 3.46 X10*6/UL (ref 4–5.2)
SODIUM SERPL-SCNC: 139 MMOL/L (ref 136–145)
WBC # BLD AUTO: 16 X10*3/UL (ref 4.4–11.3)

## 2024-12-14 PROCEDURE — 1200000002 HC GENERAL ROOM WITH TELEMETRY DAILY

## 2024-12-14 PROCEDURE — 80069 RENAL FUNCTION PANEL: CPT | Performed by: STUDENT IN AN ORGANIZED HEALTH CARE EDUCATION/TRAINING PROGRAM

## 2024-12-14 PROCEDURE — 85025 COMPLETE CBC W/AUTO DIFF WBC: CPT | Performed by: STUDENT IN AN ORGANIZED HEALTH CARE EDUCATION/TRAINING PROGRAM

## 2024-12-14 PROCEDURE — 2500000004 HC RX 250 GENERAL PHARMACY W/ HCPCS (ALT 636 FOR OP/ED): Mod: JZ

## 2024-12-14 PROCEDURE — 99233 SBSQ HOSP IP/OBS HIGH 50: CPT

## 2024-12-14 PROCEDURE — 2500000002 HC RX 250 W HCPCS SELF ADMINISTERED DRUGS (ALT 637 FOR MEDICARE OP, ALT 636 FOR OP/ED): Performed by: STUDENT IN AN ORGANIZED HEALTH CARE EDUCATION/TRAINING PROGRAM

## 2024-12-14 PROCEDURE — 2500000002 HC RX 250 W HCPCS SELF ADMINISTERED DRUGS (ALT 637 FOR MEDICARE OP, ALT 636 FOR OP/ED)

## 2024-12-14 PROCEDURE — 94640 AIRWAY INHALATION TREATMENT: CPT

## 2024-12-14 PROCEDURE — 36415 COLL VENOUS BLD VENIPUNCTURE: CPT

## 2024-12-14 PROCEDURE — 83605 ASSAY OF LACTIC ACID: CPT

## 2024-12-14 PROCEDURE — 83735 ASSAY OF MAGNESIUM: CPT | Performed by: STUDENT IN AN ORGANIZED HEALTH CARE EDUCATION/TRAINING PROGRAM

## 2024-12-14 RX ORDER — PANTOPRAZOLE SODIUM 40 MG/1
40 TABLET, DELAYED RELEASE ORAL
Status: DISCONTINUED | OUTPATIENT
Start: 2024-12-15 | End: 2024-12-19 | Stop reason: HOSPADM

## 2024-12-14 ASSESSMENT — COGNITIVE AND FUNCTIONAL STATUS - GENERAL
MOBILITY SCORE: 19
STANDING UP FROM CHAIR USING ARMS: A LITTLE
DRESSING REGULAR LOWER BODY CLOTHING: A LITTLE
TOILETING: A LITTLE
MOVING TO AND FROM BED TO CHAIR: A LITTLE
WALKING IN HOSPITAL ROOM: A LITTLE
TURNING FROM BACK TO SIDE WHILE IN FLAT BAD: A LITTLE
DRESSING REGULAR UPPER BODY CLOTHING: A LITTLE
STANDING UP FROM CHAIR USING ARMS: A LITTLE
HELP NEEDED FOR BATHING: A LITTLE
MOVING FROM LYING ON BACK TO SITTING ON SIDE OF FLAT BED WITH BEDRAILS: A LITTLE
CLIMB 3 TO 5 STEPS WITH RAILING: A LITTLE
MOBILITY SCORE: 20
MOVING TO AND FROM BED TO CHAIR: A LITTLE
DAILY ACTIVITIY SCORE: 24
PERSONAL GROOMING: A LITTLE
WALKING IN HOSPITAL ROOM: A LITTLE
DAILY ACTIVITIY SCORE: 19

## 2024-12-14 ASSESSMENT — PAIN SCALES - GENERAL
PAINLEVEL_OUTOF10: 0 - NO PAIN
PAINLEVEL_OUTOF10: 0 - NO PAIN

## 2024-12-14 NOTE — PROGRESS NOTES
Fidencio Holt is a 74 y.o. female on day 3 of admission presenting with Community acquired pneumonia, unspecified laterality.      Subjective   Patient still having diarrhea, afebrile, mildly improvement as she reports, more responsive.   No cough, no shortness of breath.       Objective     Last Recorded Vitals  /81   Pulse 99   Temp 36.5 °C (97.7 °F)   Resp 18   Wt 71.7 kg (158 lb 1.1 oz)   SpO2 98%   Intake/Output last 3 Shifts:    Intake/Output Summary (Last 24 hours) at 12/14/2024 1150  Last data filed at 12/14/2024 0401  Gross per 24 hour   Intake 600 ml   Output 2 ml   Net 598 ml       Admission Weight  Weight: 71.7 kg (158 lb) (12/11/24 1018)    Daily Weight  12/12/24 : 71.7 kg (158 lb 1.1 oz)    Image Results  Transthoracic Echo (TTE) Complete     [facility], [address] [place] [address]                 Tel [phone] and Fax [phone]    TRANSTHORACIC ECHOCARDIOGRAM REPORT       Patient Name:       FIDENCIO HOLT         Reading Physician:    [id number] Woodrow Solitario MD  Study Date:         12/13/2024          Ordering Provider:    [id number] MILAGROS CASEY  MRN/PID:            [id number]            Fellow:  Accession#:         [id number]        Nurse:  Date of Birth/Age:  1950 / 74      Sonographer:          Deep malik                                     [facility]  Gender assigned at  F                   Additional Staff:  Birth:  Height:             160.02 cm           Admit Date:  Weight:             71.67 kg            Admission Status:     Inpatient - STAT  BSA / BMI:          1.75 m2 / 27.99     Encounter#:           [id number]                      kg/m2  Blood Pressure:     130/77 mmHg         Department Location:  [place] [address]    Study Type:    TRANSTHORACIC ECHO (TTE) COMPLETE  Diagnosis/ICD: Endocarditis,  valve unspecified-I38  Indication:    Check for endocarditis  CPT Code:      Echo Complete w Full Doppler-[id number]    Patient History:  Pertinent History: HTN, breast cancer, bacteremia.    Study Detail: The following Echo studies were performed: 2D, M-Mode, Doppler and                color flow.       PHYSICIAN INTERPRETATION:  Left Ventricle: The left ventricular systolic function is normal, with a Copeland's biplane calculated ejection fraction of 66%. There is borderline concentric left ventricular hypertrophy. There are no regional left ventricular wall motion abnormalities. The left ventricular cavity size is normal. There is mildly increased septal and normal posterior left ventricular wall thickness. Left ventricular diastolic filling is indeterminate with normal left atrial filling pressure. There is anterobasal septal hypertrophy with a sigmoid septum.  Left Atrium: The left atrium is mildly dilated.  Right Ventricle: The right ventricle is normal in size. There is low normal right ventricular systolic function. TAPSE= 17.5 mm.  Right Atrium: The right atrium is normal in size.  Aortic Valve: The aortic valve is trileaflet. There are increased aortic valve velocities due to increased flow/dynamic ejection. There is no evidence of aortic valve regurgitation. The peak instantaneous gradient of the aortic valve is 12 mmHg.  Mitral Valve: The mitral valve is normal in structure. There is trace mitral valve regurgitation. There is mild chordal calcification.  Tricuspid Valve: The tricuspid valve is structurally normal. There is mild tricuspid regurgitation. The Doppler estimated RVSP is mild to moderately elevated at 47.6 mmHg.  Pulmonic Valve: The pulmonic valve is not well visualized. The pulmonic valve regurgitation was not well visualized.  Pericardium: Trivial pericardial effusion.  Aorta: The aortic root is normal.  Systemic Veins: The inferior vena cava appears normal in size, with IVC inspiratory collapse  greater than 50%.       CONCLUSIONS:   1. No definite valvular vegetations were visualized.   2. The left ventricular systolic function is normal, with a Copeland's biplane calculated ejection fraction of 66%.   3. Left ventricular diastolic filling is indeterminate with normal left atrial filling pressure.   4. There is anterobasal septal hypertrophy with a sigmoid septum.   5. There is low normal right ventricular systolic function.   6. TAPSE= 17.5 mm.   7. The left atrium is mildly dilated.   8. Mild to moderately elevated right ventricular systolic pressure.    QUANTITATIVE DATA SUMMARY:     2D MEASUREMENTS:          Normal Ranges:  Ao Root d:       3.50 cm  (2.0-3.7cm)  LAs:             3.40 cm  (2.7-4.0cm)  IVSd:            1.30 cm  (0.6-1.1cm)  LVPWd:           1.00 cm  (0.6-1.1cm)  LVIDd:           4.40 cm  (3.9-5.9cm)  LVIDs:           2.50 cm  LV Mass Index:   103 g/m2  LVEDV Index:     43 ml/m2  LV % FS          43.2 %       LA VOLUME:                    Normal Ranges:  LA Vol A4C:        66.1 ml    (22+/-6mL/m2)  LA Vol A2C:        66.0 ml  LA Vol BP:         67.7 ml  LA Vol Index A4C:  37.8ml/m2  LA Vol Index A2C:  37.7 ml/m2  LA Vol Index BP:   38.7 ml/m2  LA Area A4C:       20.3 cm2  LA Area A2C:       19.8 cm2  LA Major Axis A4C: 5.3 cm  LA Major Axis A2C: 5.0 cm  LA Volume Index:   38.7 ml/m2       RA VOLUME BY A/L METHOD:          Normal Ranges:  RA Area A4C:             15.5 cm2       LV SYSTOLIC FUNCTION BY 2D PLANIMETRY (MOD):                       Normal Ranges:  EF-A4C View:    65 % (>=55%)  EF-A2C View:    66 %  EF-Biplane:     66 %  LV EF Reported: 66 %       LV DIASTOLIC FUNCTION:             Normal Ranges:  MV Peak E:             0.66 m/s    (0.7-1.2 m/s)  MV Peak A:             0.94 m/s    (0.42-0.7 m/s)  E/A Ratio:             0.70        (1.0-2.2)  MV e'                  0.096 m/s   (>8.0)  MV lateral e'          0.10 m/s  MV medial e'           0.09 m/s  MV A Dur:              103.00  msec  E/e' Ratio:            6.87        (<8.0)       AORTIC VALVE:            Normal Ranges:  AoV Vmax:      1.76 m/s  (<=1.7m/s)  AoV Peak P.4 mmHg (<20mmHg)  LVOT Max Yovani:  1.50 m/s  (<=1.1m/s)  LVOT VTI:      24.30 cm  LVOT Diameter: 2.00 cm   (1.8-2.4cm)  AoV Area,Vmax: 2.68 cm2  (2.5-4.5cm2)       RIGHT VENTRICLE:  RV Basal 3.60 cm  RV Mid   2.90 cm  RV Major 5.7 cm  TAPSE:   17.5 mm  RV s'    0.13 m/s       TRICUSPID VALVE/RVSP:          Normal Ranges:  Peak TR Velocity:     3.34 m/s  RV Syst Pressure:     48 mmHg  (< 30mmHg)  IVC Diam:             1.35 cm       PULMONIC VALVE:          Normal Ranges:  PV Max Yovani:     1.0 m/s  (0.6-0.9m/s)  PV Max P.1 mmHg       [id number] Woodrow Solitario MD  Electronically signed on 2024 at 5:06:01 PM       ** Final **    Collected Specimen Info Organism Amikacin Ampicillin Aztreonam Cefazolin Cefepime Cefotaxime Ceftazidime Ceftriaxone Cefuroxime (oral) Ciprofloxacin Ertapenem Gentamicin Levofloxacin Meropenem   24 Blood culture from Peripheral Venipuncture Escherichia coli                               24 Blood culture from Peripheral Venipuncture Escherichia coli  S  R  R  R  R  R  R  R  R  R  S  R  R  S      Collected Specimen Info Organism Piperacillin/Tazobactam Tobramycin Trimethoprim/Sulfamethoxazole   24 Blood culture from Peripheral Venipuncture Escherichia coli         24 Blood culture from Peripheral Venipuncture Escherichia coli  R  R  S        Physical Exam    Regular HR  Clear lungs.  Abdomen soft, mildly distended, no tenderness.  No LLE  Awake, oriented, responsive           Assessment/Plan      Megan Holt is a 74 y.o. female with a PMHx of pulmonary nodules, tobacco use, HTN, R breast cancer (s/p mastectomy , s/p tamoxifen for 5 years), and recent appendicitis (s/p appendectomy 10/4/24) presenting with urinary incontinence, cough, diarrhea and shortness of breath. Imaging demonstrates likely underlying pneumonia and  pyelonephritis. Labs also showing JAYDE and microcytic anemia.      No fever overnight, BP stable   Blood culture susceptibility panel completed today showing E. Coli resistant to Zosyn, and susceptible to meropenem.  switched to meropenem on 12/13.     Updates 12/14:  -Continue  meropenem and oral vancomycin  -check Repeated blood cultures ordered 12/13  -Lactate : 0.8  -Will follow up infectious workup  -Can consider IV fluid bolus if low blood pressures and poor PO fluid intake       #E. coli bacteremia  #Sepsis  ::Both blood cultures positive for E. Coli S to carbapenem  ::Susceptibility panel: Resistant to Zosyn, cefepime, etc.. Susceptible to meropenem, ertapenem, Bactrim, and amkiacin  ::Lactate 6.8>0.4>3.3>0.8  ::Urine culture negative  -Zosyn switched to meropenem 1g q8h based on susceptibility panel  -Can consider IV fluid bolus if low blood pressures and poor PO fluid intake       #Pneumonia  ::1 week history of non-productive cough  ::Chest x-ray: increased lung markings bilaterally  ::CT angio chest: diffuse bilateral interlobular septal thickening with asymmetric prominence towards right   ::Elevated WBC of 20.2 on admission (neutrophil predominant)  ::Flu/Covid/RSV, MRSA negative; Strep pneumo, legionella negative  ::Lactate 6.8>0.4 following IV fluid bolus  -Sputum, blood, and urine cultures ordered  -Currently on meropenem. Finished 3 days course of  azithromycin  -PRN guaifenesin  -DuoNeb q6h     #Pyelonephritis  ::CT A/P w/ contrast: Patchy areas of hypoenhancement in the bilateral renal cortices mild bilateral perinephric edema suggestive of pyelonephritis   ::UA positive for leukocyte esterase  ::Urine culture negative  -Currently on meropenem        #Microcytic anemia  ::Hgb 6.3, MCV 66 on admission  ::Patient not reporting recent history of bleeds. However, states that she hasn't recently taken her iron supplement  ::Patient reporting no recent colonoscopy (hx of positive Cologuard in  2020)  ::  ::S/p 2 units pRBCs  -Will need outpatient colonoscopy     #Diarrhea  #C diff positive  ::1 week hx of diarrhea  ::S/p 2.25L IV fluids in ED  ::C diff PCR positive, toxin negative   ::Stool pathogen panel negative  -Added oral vancomycin for C diff      #JAYDE  ::Cr 1.79 (vs baseline Cr 1.0) > 1.19 (12/13)  ::S/p 2.25L IV fluids in ED   ::Patient required IV contrast for workup of sepsis in ED  -Daily RFPs     #Hypokalemia  ::K 2.9 on admission  ::Given oral potassium chloride in ED  -Daily RFPs  -Continue to replete as needed     #Elevated troponin  ::Troponin 184>603>105  ::EKG showed sinus tachycardia  ::  ::Likely related to demand ischemia iso hypotension     #HTN  ::Home meds: amlodipine 5 mg daily, lisinopril 20 mg daily, hydrochlorothiazide 25 mg daily  -Holding home antihypertensives due to drop in blood pressure while in ED     #Pulmonary nodules  ::Patient has a history of pulmonary nodules  ::New 5 mm nodule noted in R lung  -Repeat CT chest in 6 months recommended     F: PRN  E: PRN  N: Regular diet  A: PIV  DVT ppx: SCDs. Heparin on hold for drop in hb  Bowel ppx: Miralax     Code Status: Full Code  Emergency contact: LEODAN ALAMO (Daughter) [phone]                 Josefina Mitchell MD

## 2024-12-15 LAB
ALBUMIN SERPL BCP-MCNC: 2.8 G/DL (ref 3.4–5)
ANION GAP SERPL CALC-SCNC: 11 MMOL/L (ref 10–20)
BACTERIA BLD AEROBE CULT: ABNORMAL
BACTERIA BLD CULT: ABNORMAL
BACTERIA BLD CULT: NORMAL
BASOPHILS # BLD MANUAL: 0.1 X10*3/UL (ref 0–0.1)
BASOPHILS NFR BLD MANUAL: 0.9 %
BUN SERPL-MCNC: 11 MG/DL (ref 6–23)
BURR CELLS BLD QL SMEAR: ABNORMAL
CALCIUM SERPL-MCNC: 9.1 MG/DL (ref 8.6–10.6)
CHLORIDE SERPL-SCNC: 106 MMOL/L (ref 98–107)
CO2 SERPL-SCNC: 23 MMOL/L (ref 21–32)
CREAT SERPL-MCNC: 0.73 MG/DL (ref 0.5–1.05)
DACRYOCYTES BLD QL SMEAR: ABNORMAL
EGFRCR SERPLBLD CKD-EPI 2021: 86 ML/MIN/1.73M*2
EOSINOPHIL # BLD MANUAL: 0.1 X10*3/UL (ref 0–0.4)
EOSINOPHIL NFR BLD MANUAL: 0.9 %
ERYTHROCYTE [DISTWIDTH] IN BLOOD BY AUTOMATED COUNT: 20.1 % (ref 11.5–14.5)
FERRITIN SERPL-MCNC: 79 NG/ML (ref 8–150)
GLUCOSE SERPL-MCNC: 102 MG/DL (ref 74–99)
GRAM STN SPEC: ABNORMAL
HCT VFR BLD AUTO: 25.7 % (ref 36–46)
HGB BLD-MCNC: 7.7 G/DL (ref 12–16)
HGB RETIC QN: 24 PG (ref 28–38)
HYPOCHROMIA BLD QL SMEAR: ABNORMAL
IMM GRANULOCYTES # BLD AUTO: 0.11 X10*3/UL (ref 0–0.5)
IMM GRANULOCYTES NFR BLD AUTO: 1 % (ref 0–0.9)
IMMATURE RETIC FRACTION: 31.3 %
IRON SATN MFR SERPL: 6 % (ref 25–45)
IRON SERPL-MCNC: 14 UG/DL (ref 35–150)
LYMPHOCYTES # BLD MANUAL: 0.81 X10*3/UL (ref 0.8–3)
LYMPHOCYTES NFR BLD MANUAL: 7.6 %
MAGNESIUM SERPL-MCNC: 1.86 MG/DL (ref 1.6–2.4)
MCH RBC QN AUTO: 22.7 PG (ref 26–34)
MCHC RBC AUTO-ENTMCNC: 30 G/DL (ref 32–36)
MCV RBC AUTO: 76 FL (ref 80–100)
MONOCYTES # BLD MANUAL: 0.63 X10*3/UL (ref 0.05–0.8)
MONOCYTES NFR BLD MANUAL: 5.9 %
NEUTS SEG # BLD MANUAL: 9.06 X10*3/UL (ref 1.6–5)
NEUTS SEG NFR BLD MANUAL: 84.7 %
NRBC BLD-RTO: 0 /100 WBCS (ref 0–0)
OVALOCYTES BLD QL SMEAR: ABNORMAL
PHOSPHATE SERPL-MCNC: 3.8 MG/DL (ref 2.5–4.9)
PLATELET # BLD AUTO: 335 X10*3/UL (ref 150–450)
POLYCHROMASIA BLD QL SMEAR: ABNORMAL
POTASSIUM SERPL-SCNC: 3.5 MMOL/L (ref 3.5–5.3)
RBC # BLD AUTO: 3.39 X10*6/UL (ref 4–5.2)
RBC MORPH BLD: ABNORMAL
RETICS #: 0.06 X10*6/UL (ref 0.02–0.11)
RETICS/RBC NFR AUTO: 1.7 % (ref 0.5–2)
SCHISTOCYTES BLD QL SMEAR: ABNORMAL
SODIUM SERPL-SCNC: 136 MMOL/L (ref 136–145)
TIBC SERPL-MCNC: 246 UG/DL (ref 240–445)
TOTAL CELLS COUNTED BLD: 118
UIBC SERPL-MCNC: 232 UG/DL (ref 110–370)
WBC # BLD AUTO: 10.7 X10*3/UL (ref 4.4–11.3)

## 2024-12-15 PROCEDURE — 83735 ASSAY OF MAGNESIUM: CPT | Performed by: STUDENT IN AN ORGANIZED HEALTH CARE EDUCATION/TRAINING PROGRAM

## 2024-12-15 PROCEDURE — 83540 ASSAY OF IRON: CPT | Performed by: STUDENT IN AN ORGANIZED HEALTH CARE EDUCATION/TRAINING PROGRAM

## 2024-12-15 PROCEDURE — 1200000002 HC GENERAL ROOM WITH TELEMETRY DAILY

## 2024-12-15 PROCEDURE — 2500000002 HC RX 250 W HCPCS SELF ADMINISTERED DRUGS (ALT 637 FOR MEDICARE OP, ALT 636 FOR OP/ED): Performed by: STUDENT IN AN ORGANIZED HEALTH CARE EDUCATION/TRAINING PROGRAM

## 2024-12-15 PROCEDURE — 85027 COMPLETE CBC AUTOMATED: CPT | Performed by: STUDENT IN AN ORGANIZED HEALTH CARE EDUCATION/TRAINING PROGRAM

## 2024-12-15 PROCEDURE — 2500000001 HC RX 250 WO HCPCS SELF ADMINISTERED DRUGS (ALT 637 FOR MEDICARE OP)

## 2024-12-15 PROCEDURE — 99233 SBSQ HOSP IP/OBS HIGH 50: CPT | Performed by: STUDENT IN AN ORGANIZED HEALTH CARE EDUCATION/TRAINING PROGRAM

## 2024-12-15 PROCEDURE — 2500000004 HC RX 250 GENERAL PHARMACY W/ HCPCS (ALT 636 FOR OP/ED): Mod: JZ

## 2024-12-15 PROCEDURE — 80069 RENAL FUNCTION PANEL: CPT | Performed by: STUDENT IN AN ORGANIZED HEALTH CARE EDUCATION/TRAINING PROGRAM

## 2024-12-15 PROCEDURE — 36415 COLL VENOUS BLD VENIPUNCTURE: CPT | Performed by: STUDENT IN AN ORGANIZED HEALTH CARE EDUCATION/TRAINING PROGRAM

## 2024-12-15 PROCEDURE — 85045 AUTOMATED RETICULOCYTE COUNT: CPT | Performed by: STUDENT IN AN ORGANIZED HEALTH CARE EDUCATION/TRAINING PROGRAM

## 2024-12-15 PROCEDURE — 94640 AIRWAY INHALATION TREATMENT: CPT

## 2024-12-15 PROCEDURE — 85007 BL SMEAR W/DIFF WBC COUNT: CPT | Performed by: STUDENT IN AN ORGANIZED HEALTH CARE EDUCATION/TRAINING PROGRAM

## 2024-12-15 PROCEDURE — 2500000002 HC RX 250 W HCPCS SELF ADMINISTERED DRUGS (ALT 637 FOR MEDICARE OP, ALT 636 FOR OP/ED)

## 2024-12-15 PROCEDURE — 82728 ASSAY OF FERRITIN: CPT | Performed by: STUDENT IN AN ORGANIZED HEALTH CARE EDUCATION/TRAINING PROGRAM

## 2024-12-15 PROCEDURE — 87329 GIARDIA AG IA: CPT | Performed by: STUDENT IN AN ORGANIZED HEALTH CARE EDUCATION/TRAINING PROGRAM

## 2024-12-15 PROCEDURE — 87328 CRYPTOSPORIDIUM AG IA: CPT | Performed by: STUDENT IN AN ORGANIZED HEALTH CARE EDUCATION/TRAINING PROGRAM

## 2024-12-15 ASSESSMENT — COGNITIVE AND FUNCTIONAL STATUS - GENERAL
MOBILITY SCORE: 23
DAILY ACTIVITIY SCORE: 23
MOBILITY SCORE: 22
HELP NEEDED FOR BATHING: A LITTLE
DAILY ACTIVITIY SCORE: 24
WALKING IN HOSPITAL ROOM: A LITTLE
CLIMB 3 TO 5 STEPS WITH RAILING: A LITTLE
CLIMB 3 TO 5 STEPS WITH RAILING: A LITTLE

## 2024-12-15 ASSESSMENT — PAIN SCALES - GENERAL
PAINLEVEL_OUTOF10: 0 - NO PAIN

## 2024-12-15 NOTE — CARE PLAN
The patient's goals for the shift include Patient would like to feel better    The clinical goals for the shift include Pt safety    Over the shift, the patient did not make progress toward the following goals. Barriers to progression include   Problem: Safety - Adult  Goal: Free from fall injury  Outcome: Progressing     Problem: Chronic Conditions and Co-morbidities  Goal: Patient's chronic conditions and co-morbidity symptoms are monitored and maintained or improved  Outcome: Progressing     Problem: Nutrition  Goal: Oral intake greater than 50%  Outcome: Progressing  Goal: Oral intake greater 75%  Outcome: Progressing  Goal: Consume prescribed supplement  Outcome: Progressing

## 2024-12-15 NOTE — PROGRESS NOTES
Fidencio Holt is a 74 y.o. female on day 4 of admission presenting with Community acquired pneumonia, unspecified laterality.      Subjective   No acute overnight events. Patient reporting continued diarrhea, but no fevers or chills.        Objective     Last Recorded Vitals  /72   Pulse 94   Temp 36.7 °C (98.1 °F)   Resp 18   Wt 71.7 kg (158 lb 1.1 oz)   SpO2 100%   Intake/Output last 3 Shifts:    Intake/Output Summary (Last 24 hours) at 12/15/2024 1435  Last data filed at 12/15/2024 0700  Gross per 24 hour   Intake 500 ml   Output 3 ml   Net 497 ml       Admission Weight  Weight: 71.7 kg (158 lb) (12/11/24 1018)    Daily Weight  12/12/24 : 71.7 kg (158 lb 1.1 oz)    Image Results  Transthoracic Echo (TTE) Complete     [facility], [address] [place] [address]                 Tel [phone] and Fax [phone]    TRANSTHORACIC ECHOCARDIOGRAM REPORT       Patient Name:       FIDENCIO HOLT         Reading Physician:    [id number] Woodrow Solitario MD  Study Date:         12/13/2024          Ordering Provider:    [id number] MILAGROS CASEY  MRN/PID:            [id number]            Fellow:  Accession#:         [id number]        Nurse:  Date of Birth/Age:  1950 / 74      Sonographer:          Deep malik                                     [facility]  Gender assigned at  F                   Additional Staff:  Birth:  Height:             160.02 cm           Admit Date:  Weight:             71.67 kg            Admission Status:     Inpatient - STAT  BSA / BMI:          1.75 m2 / 27.99     Encounter#:           [id number]                      kg/m2  Blood Pressure:     130/77 mmHg         Department Location:  [place] [address]    Study Type:    TRANSTHORACIC ECHO (TTE) COMPLETE  Diagnosis/ICD: Endocarditis, valve unspecified-I38  Indication:     Check for endocarditis  CPT Code:      Echo Complete w Full Doppler-[id number]    Patient History:  Pertinent History: HTN, breast cancer, bacteremia.    Study Detail: The following Echo studies were performed: 2D, M-Mode, Doppler and                color flow.       PHYSICIAN INTERPRETATION:  Left Ventricle: The left ventricular systolic function is normal, with a Copeland's biplane calculated ejection fraction of 66%. There is borderline concentric left ventricular hypertrophy. There are no regional left ventricular wall motion abnormalities. The left ventricular cavity size is normal. There is mildly increased septal and normal posterior left ventricular wall thickness. Left ventricular diastolic filling is indeterminate with normal left atrial filling pressure. There is anterobasal septal hypertrophy with a sigmoid septum.  Left Atrium: The left atrium is mildly dilated.  Right Ventricle: The right ventricle is normal in size. There is low normal right ventricular systolic function. TAPSE= 17.5 mm.  Right Atrium: The right atrium is normal in size.  Aortic Valve: The aortic valve is trileaflet. There are increased aortic valve velocities due to increased flow/dynamic ejection. There is no evidence of aortic valve regurgitation. The peak instantaneous gradient of the aortic valve is 12 mmHg.  Mitral Valve: The mitral valve is normal in structure. There is trace mitral valve regurgitation. There is mild chordal calcification.  Tricuspid Valve: The tricuspid valve is structurally normal. There is mild tricuspid regurgitation. The Doppler estimated RVSP is mild to moderately elevated at 47.6 mmHg.  Pulmonic Valve: The pulmonic valve is not well visualized. The pulmonic valve regurgitation was not well visualized.  Pericardium: Trivial pericardial effusion.  Aorta: The aortic root is normal.  Systemic Veins: The inferior vena cava appears normal in size, with IVC inspiratory collapse greater than 50%.       CONCLUSIONS:    1. No definite valvular vegetations were visualized.   2. The left ventricular systolic function is normal, with a Copeland's biplane calculated ejection fraction of 66%.   3. Left ventricular diastolic filling is indeterminate with normal left atrial filling pressure.   4. There is anterobasal septal hypertrophy with a sigmoid septum.   5. There is low normal right ventricular systolic function.   6. TAPSE= 17.5 mm.   7. The left atrium is mildly dilated.   8. Mild to moderately elevated right ventricular systolic pressure.    QUANTITATIVE DATA SUMMARY:     2D MEASUREMENTS:          Normal Ranges:  Ao Root d:       3.50 cm  (2.0-3.7cm)  LAs:             3.40 cm  (2.7-4.0cm)  IVSd:            1.30 cm  (0.6-1.1cm)  LVPWd:           1.00 cm  (0.6-1.1cm)  LVIDd:           4.40 cm  (3.9-5.9cm)  LVIDs:           2.50 cm  LV Mass Index:   103 g/m2  LVEDV Index:     43 ml/m2  LV % FS          43.2 %       LA VOLUME:                    Normal Ranges:  LA Vol A4C:        66.1 ml    (22+/-6mL/m2)  LA Vol A2C:        66.0 ml  LA Vol BP:         67.7 ml  LA Vol Index A4C:  37.8ml/m2  LA Vol Index A2C:  37.7 ml/m2  LA Vol Index BP:   38.7 ml/m2  LA Area A4C:       20.3 cm2  LA Area A2C:       19.8 cm2  LA Major Axis A4C: 5.3 cm  LA Major Axis A2C: 5.0 cm  LA Volume Index:   38.7 ml/m2       RA VOLUME BY A/L METHOD:          Normal Ranges:  RA Area A4C:             15.5 cm2       LV SYSTOLIC FUNCTION BY 2D PLANIMETRY (MOD):                       Normal Ranges:  EF-A4C View:    65 % (>=55%)  EF-A2C View:    66 %  EF-Biplane:     66 %  LV EF Reported: 66 %       LV DIASTOLIC FUNCTION:             Normal Ranges:  MV Peak E:             0.66 m/s    (0.7-1.2 m/s)  MV Peak A:             0.94 m/s    (0.42-0.7 m/s)  E/A Ratio:             0.70        (1.0-2.2)  MV e'                  0.096 m/s   (>8.0)  MV lateral e'          0.10 m/s  MV medial e'           0.09 m/s  MV A Dur:              103.00 msec  E/e' Ratio:            6.87         (<8.0)       AORTIC VALVE:            Normal Ranges:  AoV Vmax:      1.76 m/s  (<=1.7m/s)  AoV Peak P.4 mmHg (<20mmHg)  LVOT Max Yovani:  1.50 m/s  (<=1.1m/s)  LVOT VTI:      24.30 cm  LVOT Diameter: 2.00 cm   (1.8-2.4cm)  AoV Area,Vmax: 2.68 cm2  (2.5-4.5cm2)       RIGHT VENTRICLE:  RV Basal 3.60 cm  RV Mid   2.90 cm  RV Major 5.7 cm  TAPSE:   17.5 mm  RV s'    0.13 m/s       TRICUSPID VALVE/RVSP:          Normal Ranges:  Peak TR Velocity:     3.34 m/s  RV Syst Pressure:     48 mmHg  (< 30mmHg)  IVC Diam:             1.35 cm       PULMONIC VALVE:          Normal Ranges:  PV Max Yovani:     1.0 m/s  (0.6-0.9m/s)  PV Max P.1 mmHg       [id number] Woodrow Solitario MD  Electronically signed on 2024 at 5:06:01 PM       ** Final **    Collected Specimen Info Organism Amikacin Ampicillin Aztreonam Cefazolin Cefepime Cefotaxime Ceftazidime Ceftriaxone Cefuroxime (oral) Ciprofloxacin Ertapenem Gentamicin Levofloxacin Meropenem   24 Blood culture from Peripheral Venipuncture Escherichia coli                               24 Blood culture from Peripheral Venipuncture Escherichia coli  S  R  R  R  R  R  R  R  R  R  S  R  R  S      Collected Specimen Info Organism Piperacillin/Tazobactam Tobramycin Trimethoprim/Sulfamethoxazole   24 Blood culture from Peripheral Venipuncture Escherichia coli         24 Blood culture from Peripheral Venipuncture Escherichia coli  R  R  S        Physical Exam  Constitutional:       Appearance: Normal appearance.   HENT:      Head: Normocephalic and atraumatic.      Mouth/Throat:      Mouth: Mucous membranes are moist.      Pharynx: Oropharynx is clear.   Eyes:      Extraocular Movements: Extraocular movements intact.      Pupils: Pupils are equal, round, and reactive to light.   Cardiovascular:      Rate and Rhythm: Normal rate and regular rhythm.      Pulses: Normal pulses.      Heart sounds: Normal heart sounds.   Pulmonary:      Effort: Pulmonary effort is normal.  No respiratory distress.      Breath sounds: Normal breath sounds.   Abdominal:      General: Bowel sounds are normal. There is no distension.      Palpations: Abdomen is soft.      Tenderness: There is abdominal tenderness.      Comments: Upper abdominal tenderness to palpation   Skin:     General: Skin is warm and dry.   Neurological:      Mental Status: She is alert and oriented to person, place, and time.   Psychiatric:         Mood and Affect: Mood normal.         Behavior: Behavior normal.          Assessment/Plan      Megan Holt is a 74 y.o. female with a PMHx of pulmonary nodules, tobacco use, HTN, R breast cancer (s/p mastectomy 1993, s/p tamoxifen for 5 years), and recent appendicitis (s/p appendectomy 10/4/24) presenting with urinary incontinence, cough, diarrhea and shortness of breath. Imaging demonstrates likely underlying pneumonia and pyelonephritis. Labs also showing JAYDE and microcytic anemia.      Continuing meropenem for ESBL E. Coli bacteremia with plans to switch to oral Bactrim once patient is more stable. Hemolysis labs still pending.      Updates 12/14:  -Stool ova and parasite pending  -Continue meropenem and oral vancomycin, likely switch to Bactrim once patient imrpoves  -Will follow up infectious workup  -Can consider IV fluid bolus if low blood pressures and poor PO fluid intake  -Will slowly restart blood pressure medications as blood pressure improves    #E. coli bacteremia  #Sepsis  ::Both blood cultures positive for E. Coli S to carbapenem  ::Susceptibility panel: Resistant to Zosyn, cefepime, etc. Susceptible to meropenem, ertapenem, Bactrim, and amkiacin  ::Lactate 6.8>0.4>3.3>0.8  ::Urine culture negative  -Zosyn switched to meropenem 1g q8h based on susceptibility panel  -Can consider IV fluid bolus if low blood pressures and poor PO fluid intake    #Pneumonia  ::1 week history of non-productive cough  ::Chest x-ray: increased lung markings bilaterally  ::CT angio chest: diffuse  bilateral interlobular septal thickening with asymmetric prominence towards right   ::Elevated WBC of 20.2 on admission (neutrophil predominant)  ::Flu/Covid/RSV, MRSA negative; Strep pneumo, legionella negative  ::Lactate 6.8>0.4 following IV fluid bolus  -Sputum, blood, and urine cultures ordered  -Currently on meropenem. Finished 3 day course of azithromycin  -PRN guaifenesin  -DuoNeb q6h     #Pyelonephritis  ::CT A/P w/ contrast: Patchy areas of hypoenhancement in the bilateral renal cortices mild bilateral perinephric edema suggestive of pyelonephritis   ::UA positive for leukocyte esterase  ::Urine culture negative  ::Both blood cultures positive for E. Coli S to carbapenem  ::Susceptibility panel: Resistant to Zosyn, cefepime, etc. Susceptible to meropenem, ertapenem, Bactrim, and amkiacin  -Currently on meropenem     #Microcytic anemia  ::Hgb 6.3, MCV 66 on admission  ::Patient not reporting recent history of bleeds. However, states that she hasn't recently taken her iron supplement  ::Patient reporting no recent colonoscopy (hx of positive Cologuard in 2020)  ::  ::S/p 2 units pRBCs  -Will need outpatient colonoscopy     #Diarrhea  #C diff positive  ::1 week hx of diarrhea  ::S/p 2.25L IV fluids in ED  ::C diff PCR positive, toxin negative   ::Stool pathogen panel negative  -Added oral vancomycin for C diff   -Stool ova and parasite pending     #JAYDE  ::Cr 1.79 (vs baseline Cr 1.0) > 1.19 (12/13)  ::S/p 2.25L IV fluids in ED   ::Patient required IV contrast for workup of sepsis in ED  -Daily RFPs     #Hypokalemia  ::K 2.9 on admission  ::Given oral potassium chloride in ED  -Daily RFPs  -Continue to replete as needed     #Elevated troponin  ::Troponin 184>603>105  ::EKG showed sinus tachycardia  ::  ::Likely related to demand ischemia iso hypotension     #HTN  ::Home meds: amlodipine 5 mg daily, lisinopril 20 mg daily, hydrochlorothiazide 25 mg daily  -Holding home antihypertensives due to drop  in blood pressure while in ED     #Pulmonary nodules  ::Patient has a history of pulmonary nodules  ::New 5 mm nodule noted in R lung  -Repeat CT chest in 6 months recommended     F: PRN  E: PRN  N: Regular diet  A: PIV  DVT ppx: SCDs. Heparin on hold for drop in hgb  Bowel ppx: Miralax     Code Status: Full Code  Emergency contact: LEODAN ALAMO (Daughter) [phone]       Queen GERRY Treviño MD

## 2024-12-15 NOTE — CARE PLAN
The patient's goals for the shift include Patient would like to feel better    The clinical goals for the shift include Patient will remain safe during the shift.      Problem: Safety - Adult  Goal: Free from fall injury  Outcome: Progressing     Problem: Discharge Planning  Goal: Discharge to home or other facility with appropriate resources  Outcome: Progressing     Problem: Chronic Conditions and Co-morbidities  Goal: Patient's chronic conditions and co-morbidity symptoms are monitored and maintained or improved  Outcome: Progressing     Problem: Nutrition  Goal: Oral intake greater than 50%  Outcome: Progressing

## 2024-12-15 NOTE — CARE PLAN
The patient's goals for the shift include Patient would like to feel better    The clinical goals for the shift include Pt safety    Over the shift, the patient did not make progress toward the following goals. Barriers to progression include  Problem: Safety - Adult  Goal: Free from fall injury  12/14/2024 2043 by Xander Morales RN  Outcome: Progressing  12/14/2024 2043 by Xander Morales RN  Outcome: Progressing     Problem: Chronic Conditions and Co-morbidities  Goal: Patient's chronic conditions and co-morbidity symptoms are monitored and maintained or improved  12/14/2024 2043 by Xander Morales RN  Outcome: Progressing  12/14/2024 2043 by Xander Morales RN  Outcome: Progressing     Problem: Nutrition  Goal: Oral intake greater than 50%  12/14/2024 2043 by Xander Morales RN  Outcome: Progressing  12/14/2024 2043 by Xander Morales RN  Outcome: Progressing  Goal: Oral intake greater 75%  Outcome: Progressing  Goal: Consume prescribed supplement  Outcome: Progressing

## 2024-12-16 VITALS
RESPIRATION RATE: 18 BRPM | DIASTOLIC BLOOD PRESSURE: 63 MMHG | HEART RATE: 99 BPM | WEIGHT: 158.07 LBS | OXYGEN SATURATION: 100 % | TEMPERATURE: 98.8 F | HEIGHT: 63 IN | BODY MASS INDEX: 28.01 KG/M2 | SYSTOLIC BLOOD PRESSURE: 135 MMHG

## 2024-12-16 LAB
ALBUMIN SERPL BCP-MCNC: 2.7 G/DL (ref 3.4–5)
ANION GAP SERPL CALC-SCNC: 11 MMOL/L (ref 10–20)
BACTERIA BLD AEROBE CULT: ABNORMAL
BACTERIA BLD CULT: ABNORMAL
BASOPHILS # BLD AUTO: 0.05 X10*3/UL (ref 0–0.1)
BASOPHILS NFR BLD AUTO: 0.5 %
BUN SERPL-MCNC: 9 MG/DL (ref 6–23)
CALCIUM SERPL-MCNC: 8.9 MG/DL (ref 8.6–10.6)
CHLORIDE SERPL-SCNC: 109 MMOL/L (ref 98–107)
CO2 SERPL-SCNC: 24 MMOL/L (ref 21–32)
CREAT SERPL-MCNC: 0.78 MG/DL (ref 0.5–1.05)
EGFRCR SERPLBLD CKD-EPI 2021: 80 ML/MIN/1.73M*2
EOSINOPHIL # BLD AUTO: 0.24 X10*3/UL (ref 0–0.4)
EOSINOPHIL NFR BLD AUTO: 2.2 %
ERYTHROCYTE [DISTWIDTH] IN BLOOD BY AUTOMATED COUNT: 20.7 % (ref 11.5–14.5)
GLUCOSE SERPL-MCNC: 91 MG/DL (ref 74–99)
GRAM STN SPEC: ABNORMAL
HCT VFR BLD AUTO: 24.8 % (ref 36–46)
HGB BLD-MCNC: 7.6 G/DL (ref 12–16)
HYPOCHROMIA BLD QL SMEAR: NORMAL
IMM GRANULOCYTES # BLD AUTO: 0.11 X10*3/UL (ref 0–0.5)
IMM GRANULOCYTES NFR BLD AUTO: 1 % (ref 0–0.9)
LYMPHOCYTES # BLD AUTO: 1.45 X10*3/UL (ref 0.8–3)
LYMPHOCYTES NFR BLD AUTO: 13.3 %
MAGNESIUM SERPL-MCNC: 1.76 MG/DL (ref 1.6–2.4)
MCH RBC QN AUTO: 23 PG (ref 26–34)
MCHC RBC AUTO-ENTMCNC: 30.6 G/DL (ref 32–36)
MCV RBC AUTO: 75 FL (ref 80–100)
MONOCYTES # BLD AUTO: 0.81 X10*3/UL (ref 0.05–0.8)
MONOCYTES NFR BLD AUTO: 7.4 %
NEUTROPHILS # BLD AUTO: 8.23 X10*3/UL (ref 1.6–5.5)
NEUTROPHILS NFR BLD AUTO: 75.6 %
NRBC BLD-RTO: 0 /100 WBCS (ref 0–0)
OVALOCYTES BLD QL SMEAR: NORMAL
PHOSPHATE SERPL-MCNC: 3.8 MG/DL (ref 2.5–4.9)
PLATELET # BLD AUTO: 403 X10*3/UL (ref 150–450)
POLYCHROMASIA BLD QL SMEAR: NORMAL
POTASSIUM SERPL-SCNC: 3.9 MMOL/L (ref 3.5–5.3)
RBC # BLD AUTO: 3.31 X10*6/UL (ref 4–5.2)
RBC MORPH BLD: NORMAL
SCHISTOCYTES BLD QL SMEAR: NORMAL
SODIUM SERPL-SCNC: 140 MMOL/L (ref 136–145)
WBC # BLD AUTO: 10.9 X10*3/UL (ref 4.4–11.3)

## 2024-12-16 PROCEDURE — 83735 ASSAY OF MAGNESIUM: CPT | Performed by: STUDENT IN AN ORGANIZED HEALTH CARE EDUCATION/TRAINING PROGRAM

## 2024-12-16 PROCEDURE — 2500000002 HC RX 250 W HCPCS SELF ADMINISTERED DRUGS (ALT 637 FOR MEDICARE OP, ALT 636 FOR OP/ED): Performed by: STUDENT IN AN ORGANIZED HEALTH CARE EDUCATION/TRAINING PROGRAM

## 2024-12-16 PROCEDURE — 84100 ASSAY OF PHOSPHORUS: CPT | Performed by: STUDENT IN AN ORGANIZED HEALTH CARE EDUCATION/TRAINING PROGRAM

## 2024-12-16 PROCEDURE — 94640 AIRWAY INHALATION TREATMENT: CPT

## 2024-12-16 PROCEDURE — 2500000001 HC RX 250 WO HCPCS SELF ADMINISTERED DRUGS (ALT 637 FOR MEDICARE OP)

## 2024-12-16 PROCEDURE — 2500000002 HC RX 250 W HCPCS SELF ADMINISTERED DRUGS (ALT 637 FOR MEDICARE OP, ALT 636 FOR OP/ED)

## 2024-12-16 PROCEDURE — 1200000002 HC GENERAL ROOM WITH TELEMETRY DAILY

## 2024-12-16 PROCEDURE — 99233 SBSQ HOSP IP/OBS HIGH 50: CPT

## 2024-12-16 PROCEDURE — 85025 COMPLETE CBC W/AUTO DIFF WBC: CPT | Performed by: STUDENT IN AN ORGANIZED HEALTH CARE EDUCATION/TRAINING PROGRAM

## 2024-12-16 PROCEDURE — 36415 COLL VENOUS BLD VENIPUNCTURE: CPT | Performed by: STUDENT IN AN ORGANIZED HEALTH CARE EDUCATION/TRAINING PROGRAM

## 2024-12-16 PROCEDURE — 2500000004 HC RX 250 GENERAL PHARMACY W/ HCPCS (ALT 636 FOR OP/ED): Mod: JZ

## 2024-12-16 RX ORDER — HEPARIN SODIUM 5000 [USP'U]/ML
5000 INJECTION, SOLUTION INTRAVENOUS; SUBCUTANEOUS EVERY 8 HOURS SCHEDULED
Status: DISCONTINUED | OUTPATIENT
Start: 2024-12-16 | End: 2024-12-19 | Stop reason: HOSPADM

## 2024-12-16 RX ORDER — ERTAPENEM 1 G/1
1 INJECTION, POWDER, LYOPHILIZED, FOR SOLUTION INTRAMUSCULAR; INTRAVENOUS EVERY 24 HOURS
Status: DISCONTINUED | OUTPATIENT
Start: 2024-12-16 | End: 2024-12-19 | Stop reason: HOSPADM

## 2024-12-16 ASSESSMENT — COGNITIVE AND FUNCTIONAL STATUS - GENERAL
CLIMB 3 TO 5 STEPS WITH RAILING: A LITTLE
DAILY ACTIVITIY SCORE: 24
MOBILITY SCORE: 23

## 2024-12-16 ASSESSMENT — PAIN SCALES - GENERAL
PAINLEVEL_OUTOF10: 0 - NO PAIN
PAINLEVEL_OUTOF10: 3

## 2024-12-16 ASSESSMENT — PAIN - FUNCTIONAL ASSESSMENT: PAIN_FUNCTIONAL_ASSESSMENT: 0-10

## 2024-12-16 ASSESSMENT — PAIN DESCRIPTION - LOCATION: LOCATION: HEAD

## 2024-12-16 NOTE — PROGRESS NOTES
Fidencio Holt is a 74 y.o. female on day 5 of admission presenting with Community acquired pneumonia, unspecified laterality.      Subjective   No acute overnight events. Patient reporting continued diarrhea however becoming more formed, but no fevers or chills.        Objective     Last Recorded Vitals  /78 (BP Location: Left arm, Patient Position: Lying)   Pulse 89   Temp 37.3 °C (99.1 °F) (Temporal)   Resp 16   Wt 71.7 kg (158 lb 1.1 oz)   SpO2 99%   Intake/Output last 3 Shifts:    Intake/Output Summary (Last 24 hours) at 12/16/2024 1326  Last data filed at 12/16/2024 0951  Gross per 24 hour   Intake 740 ml   Output --   Net 740 ml       Admission Weight  Weight: 71.7 kg (158 lb) (12/11/24 1018)    Daily Weight  12/12/24 : 71.7 kg (158 lb 1.1 oz)    Image Results  Transthoracic Echo (TTE) Complete     [facility], [address] [place] [address]                 Tel [phone] and Fax [phone]    TRANSTHORACIC ECHOCARDIOGRAM REPORT       Patient Name:       FIDENCIO HOLT         Reading Physician:    [id number] Woodrow Solitario MD  Study Date:         12/13/2024          Ordering Provider:    [id number] CASEY  MRN/PID:            [id number]            Fellow:  Accession#:         [id number]        Nurse:  Date of Birth/Age:  1950 / 74      Sonographer:          Deep malik                                     [facility]  Gender assigned at  F                   Additional Staff:  Birth:  Height:             160.02 cm           Admit Date:  Weight:             71.67 kg            Admission Status:     Inpatient - STAT  BSA / BMI:          1.75 m2 / 27.99     Encounter#:           [id number]                      kg/m2  Blood Pressure:     130/77 mmHg         Department Location:  [place] [address]    Study Type:    TRANSTHORACIC ECHO  (TTE) COMPLETE  Diagnosis/ICD: Endocarditis, valve unspecified-I38  Indication:    Check for endocarditis  CPT Code:      Echo Complete w Full Doppler-[id number]    Patient History:  Pertinent History: HTN, breast cancer, bacteremia.    Study Detail: The following Echo studies were performed: 2D, M-Mode, Doppler and                color flow.       PHYSICIAN INTERPRETATION:  Left Ventricle: The left ventricular systolic function is normal, with a Copeland's biplane calculated ejection fraction of 66%. There is borderline concentric left ventricular hypertrophy. There are no regional left ventricular wall motion abnormalities. The left ventricular cavity size is normal. There is mildly increased septal and normal posterior left ventricular wall thickness. Left ventricular diastolic filling is indeterminate with normal left atrial filling pressure. There is anterobasal septal hypertrophy with a sigmoid septum.  Left Atrium: The left atrium is mildly dilated.  Right Ventricle: The right ventricle is normal in size. There is low normal right ventricular systolic function. TAPSE= 17.5 mm.  Right Atrium: The right atrium is normal in size.  Aortic Valve: The aortic valve is trileaflet. There are increased aortic valve velocities due to increased flow/dynamic ejection. There is no evidence of aortic valve regurgitation. The peak instantaneous gradient of the aortic valve is 12 mmHg.  Mitral Valve: The mitral valve is normal in structure. There is trace mitral valve regurgitation. There is mild chordal calcification.  Tricuspid Valve: The tricuspid valve is structurally normal. There is mild tricuspid regurgitation. The Doppler estimated RVSP is mild to moderately elevated at 47.6 mmHg.  Pulmonic Valve: The pulmonic valve is not well visualized. The pulmonic valve regurgitation was not well visualized.  Pericardium: Trivial pericardial effusion.  Aorta: The aortic root is normal.  Systemic Veins: The inferior vena cava appears  normal in size, with IVC inspiratory collapse greater than 50%.       CONCLUSIONS:   1. No definite valvular vegetations were visualized.   2. The left ventricular systolic function is normal, with a Copeland's biplane calculated ejection fraction of 66%.   3. Left ventricular diastolic filling is indeterminate with normal left atrial filling pressure.   4. There is anterobasal septal hypertrophy with a sigmoid septum.   5. There is low normal right ventricular systolic function.   6. TAPSE= 17.5 mm.   7. The left atrium is mildly dilated.   8. Mild to moderately elevated right ventricular systolic pressure.    QUANTITATIVE DATA SUMMARY:     2D MEASUREMENTS:          Normal Ranges:  Ao Root d:       3.50 cm  (2.0-3.7cm)  LAs:             3.40 cm  (2.7-4.0cm)  IVSd:            1.30 cm  (0.6-1.1cm)  LVPWd:           1.00 cm  (0.6-1.1cm)  LVIDd:           4.40 cm  (3.9-5.9cm)  LVIDs:           2.50 cm  LV Mass Index:   103 g/m2  LVEDV Index:     43 ml/m2  LV % FS          43.2 %       LA VOLUME:                    Normal Ranges:  LA Vol A4C:        66.1 ml    (22+/-6mL/m2)  LA Vol A2C:        66.0 ml  LA Vol BP:         67.7 ml  LA Vol Index A4C:  37.8ml/m2  LA Vol Index A2C:  37.7 ml/m2  LA Vol Index BP:   38.7 ml/m2  LA Area A4C:       20.3 cm2  LA Area A2C:       19.8 cm2  LA Major Axis A4C: 5.3 cm  LA Major Axis A2C: 5.0 cm  LA Volume Index:   38.7 ml/m2       RA VOLUME BY A/L METHOD:          Normal Ranges:  RA Area A4C:             15.5 cm2       LV SYSTOLIC FUNCTION BY 2D PLANIMETRY (MOD):                       Normal Ranges:  EF-A4C View:    65 % (>=55%)  EF-A2C View:    66 %  EF-Biplane:     66 %  LV EF Reported: 66 %       LV DIASTOLIC FUNCTION:             Normal Ranges:  MV Peak E:             0.66 m/s    (0.7-1.2 m/s)  MV Peak A:             0.94 m/s    (0.42-0.7 m/s)  E/A Ratio:             0.70        (1.0-2.2)  MV e'                  0.096 m/s   (>8.0)  MV lateral e'          0.10 m/s  MV medial e'            0.09 m/s  MV A Dur:              103.00 msec  E/e' Ratio:            6.87        (<8.0)       AORTIC VALVE:            Normal Ranges:  AoV Vmax:      1.76 m/s  (<=1.7m/s)  AoV Peak P.4 mmHg (<20mmHg)  LVOT Max Yovani:  1.50 m/s  (<=1.1m/s)  LVOT VTI:      24.30 cm  LVOT Diameter: 2.00 cm   (1.8-2.4cm)  AoV Area,Vmax: 2.68 cm2  (2.5-4.5cm2)       RIGHT VENTRICLE:  RV Basal 3.60 cm  RV Mid   2.90 cm  RV Major 5.7 cm  TAPSE:   17.5 mm  RV s'    0.13 m/s       TRICUSPID VALVE/RVSP:          Normal Ranges:  Peak TR Velocity:     3.34 m/s  RV Syst Pressure:     48 mmHg  (< 30mmHg)  IVC Diam:             1.35 cm       PULMONIC VALVE:          Normal Ranges:  PV Max Yovani:     1.0 m/s  (0.6-0.9m/s)  PV Max P.1 mmHg       [id number] Woodrow Solitario MD  Electronically signed on 2024 at 5:06:01 PM       ** Final **    Collected Specimen Info Organism Amikacin Ampicillin Aztreonam Cefazolin Cefepime Cefotaxime Ceftazidime Ceftriaxone Cefuroxime (oral) Ciprofloxacin Ertapenem Gentamicin Levofloxacin Meropenem   24 Blood culture from Peripheral Venipuncture Escherichia coli                               24 Blood culture from Peripheral Venipuncture Escherichia coli  S  R  R  R  R  R  R  R  R  R  S  R  R  S      Collected Specimen Info Organism Piperacillin/Tazobactam Tobramycin Trimethoprim/Sulfamethoxazole   24 Blood culture from Peripheral Venipuncture Escherichia coli         24 Blood culture from Peripheral Venipuncture Escherichia coli  R  R  S        Physical Exam  Constitutional:       Appearance: Normal appearance.   HENT:      Head: Normocephalic and atraumatic.      Mouth/Throat:      Mouth: Mucous membranes are moist.      Pharynx: Oropharynx is clear.   Eyes:      Extraocular Movements: Extraocular movements intact.      Pupils: Pupils are equal, round, and reactive to light.   Cardiovascular:      Rate and Rhythm: Normal rate and regular rhythm.      Pulses: Normal pulses.       Heart sounds: Normal heart sounds.   Pulmonary:      Effort: Pulmonary effort is normal. No respiratory distress.      Breath sounds: Normal breath sounds.   Abdominal:      General: Bowel sounds are normal. There is no distension.      Palpations: Abdomen is soft.      Tenderness: There is abdominal tenderness.      Comments: Upper abdominal tenderness to palpation   Skin:     General: Skin is warm and dry.   Neurological:      Mental Status: She is alert and oriented to person, place, and time.   Psychiatric:         Mood and Affect: Mood normal.         Behavior: Behavior normal.          Assessment/Plan      Megan Holt is a 74 y.o. female with a PMHx of pulmonary nodules, tobacco use, HTN, R breast cancer (s/p mastectomy 1993, s/p tamoxifen for 5 years), and recent appendicitis (s/p appendectomy 10/4/24) presenting with urinary incontinence, cough, diarrhea and shortness of breath. Imaging demonstrates likely underlying pneumonia and pyelonephritis. Labs also showing JAYDE and microcytic anemia.      Continuing meropenem for ESBL E. Coli bacteremia with plans to switch to oral Bactrim once patient is more stable. Hemolysis labs still pending.      Updates 12/16:  -Stool ova and parasite pending  -Continue meropenem and oral vancomycin, likely switch to Bactrim once patient imrpoves  -Will follow up infectious workup  -Can consider IV fluid bolus if low blood pressures and poor PO fluid intake  -Will slowly restart blood pressure medications as blood pressure improves  -pft ordered 12/16    #E. coli bacteremia  #Sepsis  ::Both blood cultures positive for E. Coli S to carbapenem  ::Susceptibility panel: Resistant to Zosyn, cefepime, etc. Susceptible to meropenem, ertapenem, Bactrim, and amkiacin  ::Lactate 6.8>0.4>3.3>0.8  ::Urine culture negative  -Zosyn switched to meropenem 1g q8h based on susceptibility panel  -Can consider IV fluid bolus if low blood pressures and poor PO fluid intake    #Pneumonia  ::1 week  history of non-productive cough  ::Chest x-ray: increased lung markings bilaterally  ::CT angio chest: diffuse bilateral interlobular septal thickening with asymmetric prominence towards right   ::Elevated WBC of 20.2 on admission (neutrophil predominant)  ::Flu/Covid/RSV, MRSA negative; Strep pneumo, legionella negative  ::Lactate 6.8>0.4 following IV fluid bolus  -Sputum, blood, and urine cultures ordered  -Currently on meropenem. Finished 3 day course of azithromycin  -PRN guaifenesin  -DuoNeb q6h     #Pyelonephritis  ::CT A/P w/ contrast: Patchy areas of hypoenhancement in the bilateral renal cortices mild bilateral perinephric edema suggestive of pyelonephritis   ::UA positive for leukocyte esterase  ::Urine culture negative  ::Both blood cultures positive for E. Coli S to carbapenem  ::Susceptibility panel: Resistant to Zosyn, cefepime, etc. Susceptible to meropenem, ertapenem, Bactrim, and amkiacin  -Currently on meropenem     #Microcytic anemia  ::Hgb 6.3, MCV 66 on admission  ::Patient not reporting recent history of bleeds. However, states that she hasn't recently taken her iron supplement  ::Patient reporting no recent colonoscopy (hx of positive Cologuard in 2020)  ::  ::S/p 2 units pRBCs  -Will need outpatient colonoscopy     #Diarrhea  #C diff positive  ::1 week hx of diarrhea  ::S/p 2.25L IV fluids in ED  ::C diff PCR positive, toxin negative   ::Stool pathogen panel negative  -Added oral vancomycin for C diff   -Stool ova and parasite pending     #JAYDE  ::Cr 1.79 (vs baseline Cr 1.0) > 1.19 (12/13)  ::S/p 2.25L IV fluids in ED   ::Patient required IV contrast for workup of sepsis in ED  -Daily RFPs     #Hypokalemia  ::K 2.9 on admission  ::Given oral potassium chloride in ED  -Daily RFPs  -Continue to replete as needed     #Elevated troponin  ::Troponin 184>603>105  ::EKG showed sinus tachycardia  ::  ::Likely related to demand ischemia iso hypotension     #HTN  ::Home meds: amlodipine 5  mg daily, lisinopril 20 mg daily, hydrochlorothiazide 25 mg daily  -Holding home antihypertensives due to drop in blood pressure while in ED     #Pulmonary nodules  ::Patient has a history of pulmonary nodules  ::New 5 mm nodule noted in R lung  -Repeat CT chest in 6 months recommended     F: PRN  E: PRN  N: Regular diet  A: PIV  DVT ppx: SCDs. Heparin on hold for drop in hgb  Bowel ppx: Miralax     Code Status: Full Code  Emergency contact: LEODAN ALAMO (Daughter) [phone]       Shira Moran MD

## 2024-12-16 NOTE — PROGRESS NOTES
12/16/24 141 Transitional Care Coordinator Notes:    Transitional Care Coordination Progress Note:  Patient discussed during interdisciplinary rounds.   Team members present: MD and TCC  Plan per Medical/Surgical team: Team continuing to manage patient blood pressure. Patient is still receiving IV ATB. PT/OT pending evaluation.  Payor: [employer]  Discharge disposition: TBD  Potential Barriers: none  ADOD: 2-4 days                        Assessment/Plan   Assessment & Plan  Community acquired pneumonia, unspecified laterality               Dolores Lynn RN

## 2024-12-16 NOTE — CARE PLAN
The patient's goals for the shift include Patient would like to feel better    The clinical goals for the shift include Patient will remain safe during the shift.    Problem: Safety - Adult  Goal: Free from fall injury  Outcome: Progressing     Problem: Chronic Conditions and Co-morbidities  Goal: Patient's chronic conditions and co-morbidity symptoms are monitored and maintained or improved  Outcome: Progressing     Problem: Fall/Injury  Goal: Not fall by end of shift  Outcome: Progressing  Goal: Be free from injury by end of the shift  Outcome: Progressing

## 2024-12-17 LAB
ALBUMIN SERPL BCP-MCNC: 2.8 G/DL (ref 3.4–5)
ANION GAP SERPL CALC-SCNC: 12 MMOL/L (ref 10–20)
BACTERIA BLD CULT: NORMAL
BASOPHILS # BLD AUTO: 0.04 X10*3/UL (ref 0–0.1)
BASOPHILS NFR BLD AUTO: 0.4 %
BUN SERPL-MCNC: 7 MG/DL (ref 6–23)
CALCIUM SERPL-MCNC: 8.8 MG/DL (ref 8.6–10.6)
CHLORIDE SERPL-SCNC: 109 MMOL/L (ref 98–107)
CO2 SERPL-SCNC: 21 MMOL/L (ref 21–32)
CREAT SERPL-MCNC: 0.8 MG/DL (ref 0.5–1.05)
CRYPTOSP AG STL QL IA: NEGATIVE
EGFRCR SERPLBLD CKD-EPI 2021: 77 ML/MIN/1.73M*2
EOSINOPHIL # BLD AUTO: 0.13 X10*3/UL (ref 0–0.4)
EOSINOPHIL NFR BLD AUTO: 1.3 %
ERYTHROCYTE [DISTWIDTH] IN BLOOD BY AUTOMATED COUNT: 21.5 % (ref 11.5–14.5)
G LAMBLIA AG STL QL IA: NEGATIVE
GLUCOSE SERPL-MCNC: 94 MG/DL (ref 74–99)
HCT VFR BLD AUTO: 26.3 % (ref 36–46)
HGB BLD-MCNC: 7.8 G/DL (ref 12–16)
HYPOCHROMIA BLD QL SMEAR: NORMAL
IMM GRANULOCYTES # BLD AUTO: 0.09 X10*3/UL (ref 0–0.5)
IMM GRANULOCYTES NFR BLD AUTO: 0.9 % (ref 0–0.9)
LYMPHOCYTES # BLD AUTO: 1.56 X10*3/UL (ref 0.8–3)
LYMPHOCYTES NFR BLD AUTO: 15.1 %
MAGNESIUM SERPL-MCNC: 1.93 MG/DL (ref 1.6–2.4)
MCH RBC QN AUTO: 23.1 PG (ref 26–34)
MCHC RBC AUTO-ENTMCNC: 29.7 G/DL (ref 32–36)
MCV RBC AUTO: 78 FL (ref 80–100)
MONOCYTES # BLD AUTO: 0.56 X10*3/UL (ref 0.05–0.8)
MONOCYTES NFR BLD AUTO: 5.4 %
NEUTROPHILS # BLD AUTO: 7.92 X10*3/UL (ref 1.6–5.5)
NEUTROPHILS NFR BLD AUTO: 76.9 %
NRBC BLD-RTO: 0 /100 WBCS (ref 0–0)
PHOSPHATE SERPL-MCNC: 2.8 MG/DL (ref 2.5–4.9)
PLATELET # BLD AUTO: 402 X10*3/UL (ref 150–450)
POTASSIUM SERPL-SCNC: 4 MMOL/L (ref 3.5–5.3)
RBC # BLD AUTO: 3.37 X10*6/UL (ref 4–5.2)
RBC MORPH BLD: NORMAL
SODIUM SERPL-SCNC: 138 MMOL/L (ref 136–145)
WBC # BLD AUTO: 10.3 X10*3/UL (ref 4.4–11.3)

## 2024-12-17 PROCEDURE — 2500000001 HC RX 250 WO HCPCS SELF ADMINISTERED DRUGS (ALT 637 FOR MEDICARE OP)

## 2024-12-17 PROCEDURE — 94640 AIRWAY INHALATION TREATMENT: CPT

## 2024-12-17 PROCEDURE — 85025 COMPLETE CBC W/AUTO DIFF WBC: CPT | Performed by: STUDENT IN AN ORGANIZED HEALTH CARE EDUCATION/TRAINING PROGRAM

## 2024-12-17 PROCEDURE — 36415 COLL VENOUS BLD VENIPUNCTURE: CPT | Performed by: STUDENT IN AN ORGANIZED HEALTH CARE EDUCATION/TRAINING PROGRAM

## 2024-12-17 PROCEDURE — 99232 SBSQ HOSP IP/OBS MODERATE 35: CPT

## 2024-12-17 PROCEDURE — 2500000002 HC RX 250 W HCPCS SELF ADMINISTERED DRUGS (ALT 637 FOR MEDICARE OP, ALT 636 FOR OP/ED)

## 2024-12-17 PROCEDURE — 2500000004 HC RX 250 GENERAL PHARMACY W/ HCPCS (ALT 636 FOR OP/ED)

## 2024-12-17 PROCEDURE — 1200000002 HC GENERAL ROOM WITH TELEMETRY DAILY

## 2024-12-17 PROCEDURE — 80069 RENAL FUNCTION PANEL: CPT | Performed by: STUDENT IN AN ORGANIZED HEALTH CARE EDUCATION/TRAINING PROGRAM

## 2024-12-17 PROCEDURE — 97161 PT EVAL LOW COMPLEX 20 MIN: CPT | Mod: GP

## 2024-12-17 PROCEDURE — 83735 ASSAY OF MAGNESIUM: CPT | Performed by: STUDENT IN AN ORGANIZED HEALTH CARE EDUCATION/TRAINING PROGRAM

## 2024-12-17 PROCEDURE — 97530 THERAPEUTIC ACTIVITIES: CPT | Mod: GP

## 2024-12-17 RX ORDER — FLUTICASONE FUROATE AND VILANTEROL 200; 25 UG/1; UG/1
1 POWDER RESPIRATORY (INHALATION)
Status: DISCONTINUED | OUTPATIENT
Start: 2024-12-17 | End: 2024-12-19 | Stop reason: HOSPADM

## 2024-12-17 RX ORDER — IPRATROPIUM BROMIDE AND ALBUTEROL SULFATE 2.5; .5 MG/3ML; MG/3ML
3 SOLUTION RESPIRATORY (INHALATION)
Status: DISCONTINUED | OUTPATIENT
Start: 2024-12-17 | End: 2024-12-17

## 2024-12-17 RX ORDER — IPRATROPIUM BROMIDE AND ALBUTEROL SULFATE 2.5; .5 MG/3ML; MG/3ML
3 SOLUTION RESPIRATORY (INHALATION)
Status: DISCONTINUED | OUTPATIENT
Start: 2024-12-17 | End: 2024-12-19 | Stop reason: HOSPADM

## 2024-12-17 ASSESSMENT — COGNITIVE AND FUNCTIONAL STATUS - GENERAL
TURNING FROM BACK TO SIDE WHILE IN FLAT BAD: A LITTLE
MOVING TO AND FROM BED TO CHAIR: A LITTLE
TOILETING: A LITTLE
CLIMB 3 TO 5 STEPS WITH RAILING: A LITTLE
MOVING FROM LYING ON BACK TO SITTING ON SIDE OF FLAT BED WITH BEDRAILS: A LITTLE
STANDING UP FROM CHAIR USING ARMS: A LITTLE
MOBILITY SCORE: 23
CLIMB 3 TO 5 STEPS WITH RAILING: A LITTLE
WALKING IN HOSPITAL ROOM: A LITTLE
MOBILITY SCORE: 18
DAILY ACTIVITIY SCORE: 23

## 2024-12-17 ASSESSMENT — PAIN - FUNCTIONAL ASSESSMENT
PAIN_FUNCTIONAL_ASSESSMENT: 0-10
PAIN_FUNCTIONAL_ASSESSMENT: 0-10

## 2024-12-17 ASSESSMENT — PAIN SCALES - GENERAL
PAINLEVEL_OUTOF10: 0 - NO PAIN
PAINLEVEL_OUTOF10: 4
PAINLEVEL_OUTOF10: 0 - NO PAIN

## 2024-12-17 ASSESSMENT — PAIN DESCRIPTION - LOCATION: LOCATION: HEAD

## 2024-12-17 NOTE — PROGRESS NOTES
Physical Therapy    Physical Therapy Evaluation & Treatment    Patient Name: Megan Holt  MRN: [id number]  Department: [place] [address]  Room: [id number]-  Today's Date: 12/17/2024   Time Calculation  Start Time: 1045  Stop Time: 1115  Time Calculation (min): 30 min    Assessment/Plan   PT Assessment  PT Assessment Results: Decreased strength, Decreased endurance, Decreased mobility  Rehab Prognosis: Good  Barriers to Discharge Home: Physical needs  Physical Needs: Stair navigation into home limited by function/safety  End of Session Communication: Bedside nurse  Assessment Comment: Pt demos decreased strength and endurance, will benefit from skilled PT to address all mobility deficits.  End of Session Patient Position: Bed, 3 rail up, Alarm off, caregiver present   IP OR SWING BED PT PLAN  Inpatient or Swing Bed: Inpatient  PT Plan  Treatment/Interventions: Bed mobility, Transfer training, Gait training, Stair training, Balance training, Strengthening, Endurance training, Therapeutic exercise, Therapeutic activity  PT Plan: Ongoing PT  PT Frequency: 3 times per week  PT Discharge Recommendations: Low intensity level of continued care (family assist)  PT Recommended Transfer Status: Contact guard, Assistive device  PT - OK to Discharge: Yes (POC/goals/discharge rec intensity created)      Subjective     General Visit Information:  General  Reason for Referral: PNA, sepsis, pyelonephritis  Past Medical History Relevant to Rehab: appendicitis with perforation s/p appecentctomy 10/2024, Breast CA s/p mastectomy, HTN  Prior to Session Communication: Bedside nurse  General Comment: Pt agreeable to participate, reports has been up to and from BSC though felt limited by tele and IV connections (though currently not attached).  Completing mobility with CGA-> (S), possible slight desat with amb though not sustained.  Will continue to follow.  Home Living:  Home Living  Type of Home: House (2nd floor duplex)  Lives With: Alone  Home  Adaptive Equipment: Walker rolling or standard  Home Layout: One level  Home Access: Stairs to enter with rails  Entrance Stairs-Number of Steps: 16  Prior Level of Function:  Prior Function Per Pt/Caregiver Report  Receives Help From: Family (children)  Ambulatory Assistance:  (since recent surgery, has been more homebound, amb with whw household distances mod (I))  Precautions:  Precautions  Hearing/Visual Limitations: wearing glasses, hearing appears WFL  Medical Precautions: Fall precautions    Vital Signs (Past 2hrs)        Date/Time Vitals Session Patient Position Pulse Resp SpO2 BP MAP (mmHg)    12/17/24 1045 During PT  --  --  --  --  --  --                      Objective   Pain:  Pain Assessment  Pain Assessment: 0-10  0-10 (Numeric) Pain Score: 0 - No pain  Cognition:  Cognition  Arousal/Alertness: Appropriate responses to stimuli  Orientation Level: Oriented X4  Following Commands: Follows all commands and directions without difficulty    General Assessments:     Activity Tolerance  Endurance: Tolerates 10 - 20 min exercise with multiple rests  Activity Tolerance Comments: does fatigue with amb and standing  activities    Sensation  Light Touch: No apparent deficits        Perception  Inattention/Neglect: Appears intact  Initiation: Appears intact  Motor Planning: Appears intact  Perseveration: Not present    Coordination  Movements are Fluid and Coordinated: Yes    Postural Control  Postural Control: Within Functional Limits    Static Sitting Balance  Static Sitting-Balance Support: Feet supported, No upper extremity supported  Static Sitting-Level of Assistance: Distant supervision    Static Standing Balance  Static Standing-Balance Support: No upper extremity supported  Static Standing-Level of Assistance: Contact guard, Close supervision  Dynamic Standing Balance  Dynamic Standing-Balance Support: No upper extremity supported  Dynamic Standing-Level of Assistance: Contact guard, Close  supervision  Functional Assessments:  Bed Mobility  Bed Mobility: Yes  Bed Mobility 1  Bed Mobility 1: Supine to sitting  Level of Assistance 1: Close supervision  Bed Mobility Comments 1: Completes slowly  Bed Mobility 2  Bed Mobility Comments 2: remained sitting at EOB with needs in reach    Transfers  Transfer: Yes  Transfer 1  Transfer From 1: Bed to  Transfer to 1: Stand  Transfer Level of Assistance 1: Contact guard  Transfers 4  Transfer From 4: Stand to  Transfer to 4: Bed  Transfer Level of Assistance 4: Close supervision    Ambulation/Gait Training  Ambulation/Gait Training Performed: Yes  Ambulation/Gait Training 1  Surface 1: Level tile  Device 1: No device  Assistance 1: Contact guard, Close supervision, Minimal verbal cues  Quality of Gait 1: Decreased step length, Shuffling gait, Forward flexed posture  Comments/Distance (ft) 1: 15 ft  Extremity/Trunk Assessments:  RLE   RLE : Exceptions to WFL  Strength RLE  RLE Overall Strength: Greater than or equal to 3/5 as evidenced by functional mobility  LLE   LLE : Exceptions to WFL  Strength LLE  LLE Overall Strength: Greater than or equal to 3/5 as evidenced by functional mobility  Treatments:  Ambulation/Gait Training  Ambulation/Gait Training Performed: Yes  Ambulation/Gait Training 2  Surface 2: Level tile  Device 2:  (furniture walks)  Assistance 2: Contact guard, Close supervision, Minimal verbal cues  Quality of Gait 2:  (slower pace, take short standing rest break group home through, furniture walking)  Comments/Distance (ft) 2: 15 ft  Transfers  Transfer: Yes  Transfers 2  Transfer From 2: Stand to  Transfer to 2: Commode-standard  Transfer Level of Assistance 2: Close supervision  Transfers 3  Transfer From 3: Commode-standard to  Transfer to 3: Stand  Transfer Level of Assistance 3: Close supervision    Outcome Measures:  [facility] Basic Mobility  Turning from your back to your side while in a flat bed without using bedrails: A little  Moving from lying  on your back to sitting on the side of a flat bed without using bedrails: A little  Moving to and from bed to chair (including a wheelchair): A little  Standing up from a chair using your arms (e.g. wheelchair or bedside chair): A little  To walk in hospital room: A little  Climbing 3-5 steps with railing: A little  Basic Mobility - Total Score: 18    Encounter Problems       Encounter Problems (Active)       Balance       Patient will maintain balance to allow for safe mobility mod (I), LRAD.       Start:  12/17/24    Expected End:  12/31/24               Mobility       STG - Patient will ambulate with mod (I), LRAD, 150ft x2.       Start:  12/17/24    Expected End:  12/31/24            STG - Patient will ascend and descend a flight of stairs with (S).        Start:  12/17/24    Expected End:  12/31/24               PT Transfers       STG - Patient will perform bed mobility (I).        Start:  12/17/24    Expected End:  12/31/24            STG - Patient will transfer sit to and from stand (I).        Start:  12/17/24    Expected End:  12/31/24                   Education Documentation  Mobility Training, taught by Karey Damon PT at 12/17/2024 12:22 PM.  Learner: Patient  Readiness: Acceptance  Method: Explanation  Response: Verbalizes Understanding  Comment: PT POC, continue to increased mobility while inpatient      12/17/24 at 12:23 PM - Karey Damon PT

## 2024-12-17 NOTE — PROGRESS NOTES
Fidencio Holt is a 74 y.o. female on day 6 of admission presenting with Community acquired pneumonia, unspecified laterality.      Subjective   NAEON.    Patient reporting continued diarrhea however becoming more formed, but no fevers or chills.     SOB improving. Pt remained off supplemental O2 overnight.        Objective     Last Recorded Vitals  /76 (BP Location: Left arm, Patient Position: Lying)   Pulse 90   Temp 36.6 °C (97.9 °F) (Temporal)   Resp 18   Wt 71.7 kg (158 lb 1.1 oz)   SpO2 94%   Intake/Output last 3 Shifts:    Intake/Output Summary (Last 24 hours) at 12/17/2024 1107  Last data filed at 12/17/2024 1002  Gross per 24 hour   Intake 880 ml   Output --   Net 880 ml       Admission Weight  Weight: 71.7 kg (158 lb) (12/11/24 1018)    Daily Weight  12/12/24 : 71.7 kg (158 lb 1.1 oz)    Image Results  Transthoracic Echo (TTE) Complete     [facility], [address] [place] [address]                 Tel [phone] and Fax [phone]    TRANSTHORACIC ECHOCARDIOGRAM REPORT       Patient Name:       FIDENCIO HOLT         Reading Physician:    [id number] Woodrow Solitario MD  Study Date:         12/13/2024          Ordering Provider:    [id number] CASEY  MRN/PID:            [id number]            Fellow:  Accession#:         [id number]        Nurse:  Date of Birth/Age:  1950 / 74      Sonographer:          Deep malik                                     [facility]  Gender assigned at  F                   Additional Staff:  Birth:  Height:             160.02 cm           Admit Date:  Weight:             71.67 kg            Admission Status:     Inpatient - STAT  BSA / BMI:          1.75 m2 / 27.99     Encounter#:           [id number]                      kg/m2  Blood Pressure:     130/77 mmHg         Department Location:   [facility]    Study Type:    TRANSTHORACIC ECHO (TTE) COMPLETE  Diagnosis/ICD: Endocarditis, valve unspecified-I38  Indication:    Check for endocarditis  CPT Code:      Echo Complete w Full Doppler-[id number]    Patient History:  Pertinent History: HTN, breast cancer, bacteremia.    Study Detail: The following Echo studies were performed: 2D, M-Mode, Doppler and                color flow.       PHYSICIAN INTERPRETATION:  Left Ventricle: The left ventricular systolic function is normal, with a Copeland's biplane calculated ejection fraction of 66%. There is borderline concentric left ventricular hypertrophy. There are no regional left ventricular wall motion abnormalities. The left ventricular cavity size is normal. There is mildly increased septal and normal posterior left ventricular wall thickness. Left ventricular diastolic filling is indeterminate with normal left atrial filling pressure. There is anterobasal septal hypertrophy with a sigmoid septum.  Left Atrium: The left atrium is mildly dilated.  Right Ventricle: The right ventricle is normal in size. There is low normal right ventricular systolic function. TAPSE= 17.5 mm.  Right Atrium: The right atrium is normal in size.  Aortic Valve: The aortic valve is trileaflet. There are increased aortic valve velocities due to increased flow/dynamic ejection. There is no evidence of aortic valve regurgitation. The peak instantaneous gradient of the aortic valve is 12 mmHg.  Mitral Valve: The mitral valve is normal in structure. There is trace mitral valve regurgitation. There is mild chordal calcification.  Tricuspid Valve: The tricuspid valve is structurally normal. There is mild tricuspid regurgitation. The Doppler estimated RVSP is mild to moderately elevated at 47.6 mmHg.  Pulmonic Valve: The pulmonic valve is not well visualized. The pulmonic valve regurgitation was not well visualized.  Pericardium: Trivial pericardial effusion.  Aorta: The aortic root is  normal.  Systemic Veins: The inferior vena cava appears normal in size, with IVC inspiratory collapse greater than 50%.       CONCLUSIONS:   1. No definite valvular vegetations were visualized.   2. The left ventricular systolic function is normal, with a Copeland's biplane calculated ejection fraction of 66%.   3. Left ventricular diastolic filling is indeterminate with normal left atrial filling pressure.   4. There is anterobasal septal hypertrophy with a sigmoid septum.   5. There is low normal right ventricular systolic function.   6. TAPSE= 17.5 mm.   7. The left atrium is mildly dilated.   8. Mild to moderately elevated right ventricular systolic pressure.    QUANTITATIVE DATA SUMMARY:     2D MEASUREMENTS:          Normal Ranges:  Ao Root d:       3.50 cm  (2.0-3.7cm)  LAs:             3.40 cm  (2.7-4.0cm)  IVSd:            1.30 cm  (0.6-1.1cm)  LVPWd:           1.00 cm  (0.6-1.1cm)  LVIDd:           4.40 cm  (3.9-5.9cm)  LVIDs:           2.50 cm  LV Mass Index:   103 g/m2  LVEDV Index:     43 ml/m2  LV % FS          43.2 %       LA VOLUME:                    Normal Ranges:  LA Vol A4C:        66.1 ml    (22+/-6mL/m2)  LA Vol A2C:        66.0 ml  LA Vol BP:         67.7 ml  LA Vol Index A4C:  37.8ml/m2  LA Vol Index A2C:  37.7 ml/m2  LA Vol Index BP:   38.7 ml/m2  LA Area A4C:       20.3 cm2  LA Area A2C:       19.8 cm2  LA Major Axis A4C: 5.3 cm  LA Major Axis A2C: 5.0 cm  LA Volume Index:   38.7 ml/m2       RA VOLUME BY A/L METHOD:          Normal Ranges:  RA Area A4C:             15.5 cm2       LV SYSTOLIC FUNCTION BY 2D PLANIMETRY (MOD):                       Normal Ranges:  EF-A4C View:    65 % (>=55%)  EF-A2C View:    66 %  EF-Biplane:     66 %  LV EF Reported: 66 %       LV DIASTOLIC FUNCTION:             Normal Ranges:  MV Peak E:             0.66 m/s    (0.7-1.2 m/s)  MV Peak A:             0.94 m/s    (0.42-0.7 m/s)  E/A Ratio:             0.70        (1.0-2.2)  MV e'                  0.096 m/s    (>8.0)  MV lateral e'          0.10 m/s  MV medial e'           0.09 m/s  MV A Dur:              103.00 msec  E/e' Ratio:            6.87        (<8.0)       AORTIC VALVE:            Normal Ranges:  AoV Vmax:      1.76 m/s  (<=1.7m/s)  AoV Peak P.4 mmHg (<20mmHg)  LVOT Max Yovani:  1.50 m/s  (<=1.1m/s)  LVOT VTI:      24.30 cm  LVOT Diameter: 2.00 cm   (1.8-2.4cm)  AoV Area,Vmax: 2.68 cm2  (2.5-4.5cm2)       RIGHT VENTRICLE:  RV Basal 3.60 cm  RV Mid   2.90 cm  RV Major 5.7 cm  TAPSE:   17.5 mm  RV s'    0.13 m/s       TRICUSPID VALVE/RVSP:          Normal Ranges:  Peak TR Velocity:     3.34 m/s  RV Syst Pressure:     48 mmHg  (< 30mmHg)  IVC Diam:             1.35 cm       PULMONIC VALVE:          Normal Ranges:  PV Max Yovani:     1.0 m/s  (0.6-0.9m/s)  PV Max P.1 mmHg       [id number] Woodrow Solitario MD  Electronically signed on 2024 at 5:06:01 PM       ** Final **    Collected Specimen Info Organism Amikacin Ampicillin Aztreonam Cefazolin Cefepime Cefotaxime Ceftazidime Ceftriaxone Cefuroxime (oral) Ciprofloxacin Ertapenem Gentamicin Levofloxacin Meropenem   24 Blood culture from Peripheral Venipuncture Escherichia coli                               24 Blood culture from Peripheral Venipuncture Escherichia coli  S  R  R  R  R  R  R  R  R  R  S  R  R  S      Collected Specimen Info Organism Piperacillin/Tazobactam Tobramycin Trimethoprim/Sulfamethoxazole   24 Blood culture from Peripheral Venipuncture Escherichia coli         24 Blood culture from Peripheral Venipuncture Escherichia coli  R  R  S        Physical Exam  Constitutional:       Appearance: Normal appearance.   HENT:      Head: Normocephalic and atraumatic.      Mouth/Throat:      Mouth: Mucous membranes are moist.      Pharynx: Oropharynx is clear.   Eyes:      Extraocular Movements: Extraocular movements intact.      Pupils: Pupils are equal, round, and reactive to light.   Cardiovascular:      Rate and Rhythm: Normal  rate and regular rhythm.      Pulses: Normal pulses.      Heart sounds: Normal heart sounds.   Pulmonary:      Effort: Pulmonary effort is normal. No respiratory distress.      Breath sounds: Normal breath sounds.   Abdominal:      General: Bowel sounds are normal. There is no distension.      Palpations: Abdomen is soft.      Tenderness: There is abdominal tenderness.      Comments: Upper abdominal tenderness to palpation   Skin:     General: Skin is warm and dry.   Neurological:      Mental Status: She is alert and oriented to person, place, and time.   Psychiatric:         Mood and Affect: Mood normal.         Behavior: Behavior normal.          Assessment/Plan      Megan Holt is a 74 y.o. female with a PMHx of pulmonary nodules, tobacco use, HTN, R breast cancer (s/p mastectomy 1993, s/p tamoxifen for 5 years), and recent appendicitis (s/p appendectomy 10/4/24) presenting with urinary incontinence, cough, diarrhea and shortness of breath. Imaging demonstrates likely underlying pneumonia and pyelonephritis. Labs also showing JAYDE and microcytic anemia.      Continuing meropenem for ESBL E. Coli bacteremia with plans to switch to oral Bactrim once patient is more stable.      Updates 12/17:  -Stool ova and parasite pending  -Continue meropenem and oral vancomycin, likely switch to Bactrim once patient imrpoves  -Will follow up infectious workup  -Can consider IV fluid bolus if low blood pressures and poor PO fluid intake  -Will slowly restart blood pressure medications as blood pressure improves  -pft ordered 12/16- will be completed prior to discharge     #E. coli bacteremia  #Sepsis  ::Both blood cultures positive for E. Coli S to carbapenem  ::Susceptibility panel: Resistant to Zosyn, cefepime, etc. Susceptible to meropenem, ertapenem, Bactrim, and amkiacin  ::Lactate 6.8>0.4>3.3>0.8  ::Urine culture negative  -Zosyn switched to meropenem 1g q8h based on susceptibility panel  -Can consider IV fluid bolus if low  blood pressures and poor PO fluid intake    #Pneumonia  ::1 week history of non-productive cough  ::Chest x-ray: increased lung markings bilaterally  ::CT angio chest: diffuse bilateral interlobular septal thickening with asymmetric prominence towards right   ::Elevated WBC of 20.2 on admission (neutrophil predominant)  ::Flu/Covid/RSV, MRSA negative; Strep pneumo, legionella negative  ::Lactate 6.8>0.4 following IV fluid bolus  -Sputum, blood, and urine cultures ordered  -Currently on meropenem. Finished 3 day course of azithromycin  -PRN guaifenesin  -DuoNeb TID     #Pyelonephritis  ::CT A/P w/ contrast: Patchy areas of hypoenhancement in the bilateral renal cortices mild bilateral perinephric edema suggestive of pyelonephritis   ::UA positive for leukocyte esterase  ::Urine culture negative  ::Both blood cultures positive for E. Coli S to carbapenem  ::Susceptibility panel: Resistant to Zosyn, cefepime, etc. Susceptible to meropenem, ertapenem, Bactrim, and amkiacin  -Currently on meropenem     #Microcytic anemia  ::Hgb 6.3, MCV 66 on admission  ::Patient not reporting recent history of bleeds. However, states that she hasn't recently taken her iron supplement  ::Patient reporting no recent colonoscopy (hx of positive Cologuard in 2020)  ::  ::S/p 2 units pRBCs  -Will need outpatient colonoscopy     #Diarrhea  #C diff positive  ::1 week hx of diarrhea  ::S/p 2.25L IV fluids in ED  ::C diff PCR positive, toxin negative   ::Stool pathogen panel negative  -Added oral vancomycin for C diff   -Stool ova and parasite pending     #JAYDE  ::Cr 1.79 (vs baseline Cr 1.0) > 1.19 (12/13)  ::S/p 2.25L IV fluids in ED   ::Patient required IV contrast for workup of sepsis in ED  -Daily RFPs     #Hypokalemia  ::K 2.9 on admission  ::Given oral potassium chloride in ED  -Daily RFPs  -Continue to replete as needed     #Elevated troponin  ::Troponin 184>603>105  ::EKG showed sinus tachycardia  ::  ::Likely related to  demand ischemia iso hypotension     #HTN  ::Home meds: amlodipine 5 mg daily, lisinopril 20 mg daily, hydrochlorothiazide 25 mg daily  -Holding home antihypertensives due to drop in blood pressure while in ED     #Pulmonary nodules  ::Patient has a history of pulmonary nodules  ::New 5 mm nodule noted in R lung  -Repeat CT chest in 6 months recommended     F: PRN  E: PRN  N: Regular diet  A: PIV  DVT ppx: SCDs. Heparin on hold for drop in hgb  Bowel ppx: Miralax     Code Status: Full Code  Emergency contact: LEODAN ALAMO (Daughter) [phone]       Shira Moran MD

## 2024-12-17 NOTE — CARE PLAN
The patient's goals for the shift include Patient would like to feel better    The clinical goals for the shift include patient will remain safe and hds during shift      Problem: Safety - Adult  Goal: Free from fall injury  Outcome: Progressing     Problem: Nutrition  Goal: Oral intake greater than 50%  Outcome: Progressing     Problem: Fall/Injury  Goal: Not fall by end of shift  Outcome: Progressing  Goal: Be free from injury by end of the shift  Outcome: Progressing

## 2024-12-18 LAB
ALBUMIN SERPL BCP-MCNC: 2.9 G/DL (ref 3.4–5)
ANION GAP SERPL CALC-SCNC: 13 MMOL/L (ref 10–20)
BASOPHILS # BLD AUTO: 0.04 X10*3/UL (ref 0–0.1)
BASOPHILS NFR BLD AUTO: 0.4 %
BUN SERPL-MCNC: 6 MG/DL (ref 6–23)
CALCIUM SERPL-MCNC: 9.2 MG/DL (ref 8.6–10.6)
CHLORIDE SERPL-SCNC: 108 MMOL/L (ref 98–107)
CO2 SERPL-SCNC: 22 MMOL/L (ref 21–32)
CREAT SERPL-MCNC: 0.73 MG/DL (ref 0.5–1.05)
EGFRCR SERPLBLD CKD-EPI 2021: 86 ML/MIN/1.73M*2
EOSINOPHIL # BLD AUTO: 0.08 X10*3/UL (ref 0–0.4)
EOSINOPHIL NFR BLD AUTO: 0.8 %
ERYTHROCYTE [DISTWIDTH] IN BLOOD BY AUTOMATED COUNT: 21.5 % (ref 11.5–14.5)
GLUCOSE SERPL-MCNC: 82 MG/DL (ref 74–99)
HCT VFR BLD AUTO: 25.5 % (ref 36–46)
HGB BLD-MCNC: 7.6 G/DL (ref 12–16)
HYPOCHROMIA BLD QL SMEAR: NORMAL
IMM GRANULOCYTES # BLD AUTO: 0.07 X10*3/UL (ref 0–0.5)
IMM GRANULOCYTES NFR BLD AUTO: 0.7 % (ref 0–0.9)
LYMPHOCYTES # BLD AUTO: 1.14 X10*3/UL (ref 0.8–3)
LYMPHOCYTES NFR BLD AUTO: 11.4 %
MAGNESIUM SERPL-MCNC: 1.77 MG/DL (ref 1.6–2.4)
MCH RBC QN AUTO: 22.9 PG (ref 26–34)
MCHC RBC AUTO-ENTMCNC: 29.8 G/DL (ref 32–36)
MCV RBC AUTO: 77 FL (ref 80–100)
MONOCYTES # BLD AUTO: 0.43 X10*3/UL (ref 0.05–0.8)
MONOCYTES NFR BLD AUTO: 4.3 %
NEUTROPHILS # BLD AUTO: 8.27 X10*3/UL (ref 1.6–5.5)
NEUTROPHILS NFR BLD AUTO: 82.4 %
NRBC BLD-RTO: 0 /100 WBCS (ref 0–0)
PHOSPHATE SERPL-MCNC: 3.7 MG/DL (ref 2.5–4.9)
PLATELET # BLD AUTO: 428 X10*3/UL (ref 150–450)
POTASSIUM SERPL-SCNC: 4 MMOL/L (ref 3.5–5.3)
RBC # BLD AUTO: 3.32 X10*6/UL (ref 4–5.2)
RBC MORPH BLD: NORMAL
SODIUM SERPL-SCNC: 139 MMOL/L (ref 136–145)
WBC # BLD AUTO: 10 X10*3/UL (ref 4.4–11.3)

## 2024-12-18 PROCEDURE — 2500000001 HC RX 250 WO HCPCS SELF ADMINISTERED DRUGS (ALT 637 FOR MEDICARE OP)

## 2024-12-18 PROCEDURE — 36415 COLL VENOUS BLD VENIPUNCTURE: CPT | Performed by: STUDENT IN AN ORGANIZED HEALTH CARE EDUCATION/TRAINING PROGRAM

## 2024-12-18 PROCEDURE — 94640 AIRWAY INHALATION TREATMENT: CPT

## 2024-12-18 PROCEDURE — 2500000002 HC RX 250 W HCPCS SELF ADMINISTERED DRUGS (ALT 637 FOR MEDICARE OP, ALT 636 FOR OP/ED)

## 2024-12-18 PROCEDURE — 80069 RENAL FUNCTION PANEL: CPT | Performed by: STUDENT IN AN ORGANIZED HEALTH CARE EDUCATION/TRAINING PROGRAM

## 2024-12-18 PROCEDURE — 2500000004 HC RX 250 GENERAL PHARMACY W/ HCPCS (ALT 636 FOR OP/ED)

## 2024-12-18 PROCEDURE — 1200000002 HC GENERAL ROOM WITH TELEMETRY DAILY

## 2024-12-18 PROCEDURE — 85025 COMPLETE CBC W/AUTO DIFF WBC: CPT | Performed by: STUDENT IN AN ORGANIZED HEALTH CARE EDUCATION/TRAINING PROGRAM

## 2024-12-18 PROCEDURE — 99232 SBSQ HOSP IP/OBS MODERATE 35: CPT

## 2024-12-18 PROCEDURE — 83735 ASSAY OF MAGNESIUM: CPT | Performed by: STUDENT IN AN ORGANIZED HEALTH CARE EDUCATION/TRAINING PROGRAM

## 2024-12-18 ASSESSMENT — COGNITIVE AND FUNCTIONAL STATUS - GENERAL
DAILY ACTIVITIY SCORE: 24
DAILY ACTIVITIY SCORE: 24
MOBILITY SCORE: 24
MOBILITY SCORE: 24

## 2024-12-18 ASSESSMENT — PAIN SCALES - GENERAL: PAINLEVEL_OUTOF10: 3

## 2024-12-18 NOTE — PROGRESS NOTES
Fidencio Holt is a 74 y.o. female on day 7 of admission presenting with Community acquired pneumonia, unspecified laterality.      Subjective   Overnight patient developed sore throat and febrile episode.     This morning patient was afebrile with mild sore throat. Denied subjective fever, chills, night sweats on morning evaluation.     Patient reporting continued diarrhea however becoming more formed and slowing down per patient with less frequency of bowel movements    SOB improving. Pt remained off supplemental O2 overnight.        Objective     Last Recorded Vitals  /71   Pulse 100   Temp 37.6 °C (99.7 °F)   Resp 16   Wt 71.7 kg (158 lb 1.1 oz)   SpO2 (!) 87%   Intake/Output last 3 Shifts:    Intake/Output Summary (Last 24 hours) at 12/18/2024 0806  Last data filed at 12/18/2024 0600  Gross per 24 hour   Intake 1410 ml   Output 0 ml   Net 1410 ml       Admission Weight  Weight: 71.7 kg (158 lb) (12/11/24 1018)    Daily Weight  12/12/24 : 71.7 kg (158 lb 1.1 oz)    Image Results  Transthoracic Echo (TTE) [facility], [address] [place] [address]                 Tel [phone] and Fax [phone]    TRANSTHORACIC ECHOCARDIOGRAM REPORT       Patient Name:       FIDENCIO HOLT         Reading Physician:    [id number] Woodrow Solitario MD  Study Date:         12/13/2024          Ordering Provider:    [id number] MILAGROS CASEY  MRN/PID:            [id number]            Fellow:  Accession#:         [id number]        Nurse:  Date of Birth/Age:  1950 / 74      Sonographer:          Deep malik                                     [facility]  Gender assigned at  F                   Additional Staff:  Birth:  Height:             160.02 cm           Admit Date:  Weight:             71.67 kg            Admission Status:     Inpatient -  STAT  BSA / BMI:          1.75 m2 / 27.99     Encounter#:           [id number]                      kg/m2  Blood Pressure:     130/77 mmHg         Department Location:  [place] [address]    Study Type:    TRANSTHORACIC ECHO (TTE) COMPLETE  Diagnosis/ICD: Endocarditis, valve unspecified-I38  Indication:    Check for endocarditis  CPT Code:      Echo Complete w Full [id number]    Patient History:  Pertinent History: HTN, breast cancer, bacteremia.    Study Detail: The following Echo studies were performed: 2D, M-Mode, Doppler and                color flow.       PHYSICIAN INTERPRETATION:  Left Ventricle: The left ventricular systolic function is normal, with a Copeland's biplane calculated ejection fraction of 66%. There is borderline concentric left ventricular hypertrophy. There are no regional left ventricular wall motion abnormalities. The left ventricular cavity size is normal. There is mildly increased septal and normal posterior left ventricular wall thickness. Left ventricular diastolic filling is indeterminate with normal left atrial filling pressure. There is anterobasal septal hypertrophy with a sigmoid septum.  Left Atrium: The left atrium is mildly dilated.  Right Ventricle: The right ventricle is normal in size. There is low normal right ventricular systolic function. TAPSE= 17.5 mm.  Right Atrium: The right atrium is normal in size.  Aortic Valve: The aortic valve is trileaflet. There are increased aortic valve velocities due to increased flow/dynamic ejection. There is no evidence of aortic valve regurgitation. The peak instantaneous gradient of the aortic valve is 12 mmHg.  Mitral Valve: The mitral valve is normal in structure. There is trace mitral valve regurgitation. There is mild chordal calcification.  Tricuspid Valve: The tricuspid valve is structurally normal. There is mild tricuspid regurgitation. The Doppler estimated RVSP is mild to moderately elevated at 47.6 mmHg.  Pulmonic Valve: The  pulmonic valve is not well visualized. The pulmonic valve regurgitation was not well visualized.  Pericardium: Trivial pericardial effusion.  Aorta: The aortic root is normal.  Systemic Veins: The inferior vena cava appears normal in size, with IVC inspiratory collapse greater than 50%.       CONCLUSIONS:   1. No definite valvular vegetations were visualized.   2. The left ventricular systolic function is normal, with a Copeland's biplane calculated ejection fraction of 66%.   3. Left ventricular diastolic filling is indeterminate with normal left atrial filling pressure.   4. There is anterobasal septal hypertrophy with a sigmoid septum.   5. There is low normal right ventricular systolic function.   6. TAPSE= 17.5 mm.   7. The left atrium is mildly dilated.   8. Mild to moderately elevated right ventricular systolic pressure.    QUANTITATIVE DATA SUMMARY:     2D MEASUREMENTS:          Normal Ranges:  Ao Root d:       3.50 cm  (2.0-3.7cm)  LAs:             3.40 cm  (2.7-4.0cm)  IVSd:            1.30 cm  (0.6-1.1cm)  LVPWd:           1.00 cm  (0.6-1.1cm)  LVIDd:           4.40 cm  (3.9-5.9cm)  LVIDs:           2.50 cm  LV Mass Index:   103 g/m2  LVEDV Index:     43 ml/m2  LV % FS          43.2 %       LA VOLUME:                    Normal Ranges:  LA Vol A4C:        66.1 ml    (22+/-6mL/m2)  LA Vol A2C:        66.0 ml  LA Vol BP:         67.7 ml  LA Vol Index A4C:  37.8ml/m2  LA Vol Index A2C:  37.7 ml/m2  LA Vol Index BP:   38.7 ml/m2  LA Area A4C:       20.3 cm2  LA Area A2C:       19.8 cm2  LA Major Axis A4C: 5.3 cm  LA Major Axis A2C: 5.0 cm  LA Volume Index:   38.7 ml/m2       RA VOLUME BY A/L METHOD:          Normal Ranges:  RA Area A4C:             15.5 cm2       LV SYSTOLIC FUNCTION BY 2D PLANIMETRY (MOD):                       Normal Ranges:  EF-A4C View:    65 % (>=55%)  EF-A2C View:    66 %  EF-Biplane:     66 %  LV EF Reported: 66 %       LV DIASTOLIC FUNCTION:             Normal Ranges:  MV Peak E:              0.66 m/s    (0.7-1.2 m/s)  MV Peak A:             0.94 m/s    (0.42-0.7 m/s)  E/A Ratio:             0.70        (1.0-2.2)  MV e'                  0.096 m/s   (>8.0)  MV lateral e'          0.10 m/s  MV medial e'           0.09 m/s  MV A Dur:              103.00 msec  E/e' Ratio:            6.87        (<8.0)       AORTIC VALVE:            Normal Ranges:  AoV Vmax:      1.76 m/s  (<=1.7m/s)  AoV Peak P.4 mmHg (<20mmHg)  LVOT Max Yovani:  1.50 m/s  (<=1.1m/s)  LVOT VTI:      24.30 cm  LVOT Diameter: 2.00 cm   (1.8-2.4cm)  AoV Area,Vmax: 2.68 cm2  (2.5-4.5cm2)       RIGHT VENTRICLE:  RV Basal 3.60 cm  RV Mid   2.90 cm  RV Major 5.7 cm  TAPSE:   17.5 mm  RV s'    0.13 m/s       TRICUSPID VALVE/RVSP:          Normal Ranges:  Peak TR Velocity:     3.34 m/s  RV Syst Pressure:     48 mmHg  (< 30mmHg)  IVC Diam:             1.35 cm       PULMONIC VALVE:          Normal Ranges:  PV Max Yovani:     1.0 m/s  (0.6-0.9m/s)  PV Max P.1 mmHg       [id number] Woodrow Solitario MD  Electronically signed on 2024 at 5:06:01 PM       ** Final **    Collected Specimen Info Organism Amikacin Ampicillin Aztreonam Cefazolin Cefepime Cefotaxime Ceftazidime Ceftriaxone Cefuroxime (oral) Ciprofloxacin Ertapenem Gentamicin Levofloxacin Meropenem   24 Blood culture from Peripheral Venipuncture Escherichia coli                               24 Blood culture from Peripheral Venipuncture Escherichia coli  S  R  R  R  R  R  R  R  R  R  S  R  R  S      Collected Specimen Info Organism Piperacillin/Tazobactam Tobramycin Trimethoprim/Sulfamethoxazole   24 Blood culture from Peripheral Venipuncture Escherichia coli         24 Blood culture from Peripheral Venipuncture Escherichia coli  R  R  S        Physical Exam  Constitutional:       Appearance: Normal appearance.   HENT:      Head: Normocephalic and atraumatic.      Mouth/Throat:      Mouth: Mucous membranes are moist.      Pharynx: Oropharynx is clear.   Eyes:       Extraocular Movements: Extraocular movements intact.      Pupils: Pupils are equal, round, and reactive to light.   Cardiovascular:      Rate and Rhythm: Normal rate and regular rhythm.      Pulses: Normal pulses.      Heart sounds: Normal heart sounds.   Pulmonary:      Effort: Pulmonary effort is normal. No respiratory distress.      Breath sounds: Normal breath sounds.   Abdominal:      General: Bowel sounds are normal. There is no distension.      Palpations: Abdomen is soft.      Tenderness: There is abdominal tenderness.   Skin:     General: Skin is warm and dry.   Neurological:      Mental Status: She is alert and oriented to person, place, and time.   Psychiatric:         Mood and Affect: Mood normal.         Behavior: Behavior normal.          Assessment/Plan      Megan Holt is a 74 y.o. female with a PMHx of pulmonary nodules, tobacco use, HTN, R breast cancer (s/p mastectomy 1993, s/p tamoxifen for 5 years), and recent appendicitis (s/p appendectomy 10/4/24) presenting with urinary incontinence, cough, diarrhea and shortness of breath. Imaging demonstrates likely underlying pneumonia and pyelonephritis. Labs also showing JAYDE and microcytic anemia.      Continuing meropenem for ESBL E. Coli bacteremia with plans to switch to oral Bactrim once patient is more stable.      Updates 12/18:  - Developed sore throat last night with febrile episode however afebrile as of this AM. Will try lozenges   - Stool crypto and giardia - Neg.   - Stool ova and parasite pending  - PT recommends- Low intensity level of continued care (family assist)   - Continue meropenem and oral vancomycin, likely switch to Bactrim once patient improves  - Can consider IV fluid bolus if low blood pressures and poor PO fluid intake  - Will slowly restart blood pressure medications as blood pressure improves  - pft ordered 12/16- will be completed prior to discharge     #E. coli bacteremia  #Sepsis  ::Both blood cultures positive for E.  Coli S to carbapenem  ::Susceptibility panel: Resistant to Zosyn, cefepime, etc. Susceptible to meropenem, ertapenem, Bactrim, and amkiacin  ::Lactate 6.8>0.4>3.3>0.8  ::Urine culture negative  -Zosyn switched to meropenem 1g q8h based on susceptibility panel  -Can consider IV fluid bolus if low blood pressures and poor PO fluid intake    #Pneumonia  ::1 week history of non-productive cough  ::Chest x-ray: increased lung markings bilaterally  ::CT angio chest: diffuse bilateral interlobular septal thickening with asymmetric prominence towards right   ::Elevated WBC of 20.2 on admission (neutrophil predominant)  ::Flu/Covid/RSV, MRSA negative; Strep pneumo, legionella negative  ::Lactate 6.8>0.4 following IV fluid bolus  -Sputum, blood, and urine cultures ordered  -Currently on meropenem. Finished 3 day course of azithromycin  -PRN guaifenesin  -DuoNeb TID     #Pyelonephritis  ::CT A/P w/ contrast: Patchy areas of hypoenhancement in the bilateral renal cortices mild bilateral perinephric edema suggestive of pyelonephritis   ::UA positive for leukocyte esterase  ::Urine culture negative  ::Both blood cultures positive for E. Coli S to carbapenem  ::Susceptibility panel: Resistant to Zosyn, cefepime, etc. Susceptible to meropenem, ertapenem, Bactrim, and amkiacin  -Currently on meropenem     #Microcytic anemia  ::Hgb 6.3, MCV 66 on admission  ::Patient not reporting recent history of bleeds. However, states that she hasn't recently taken her iron supplement  ::Patient reporting no recent colonoscopy (hx of positive Cologuard in 2020)  ::  ::S/p 2 units pRBCs  -Will need outpatient colonoscopy     #Diarrhea  #C diff positive  ::1 week hx of diarrhea  ::S/p 2.25L IV fluids in ED  ::C diff PCR positive, toxin negative   ::Stool pathogen panel negative  -Added oral vancomycin for C diff   -Stool ova and parasite pending     #JAYDE  ::Cr 1.79 (vs baseline Cr 1.0) > 1.19 (12/13)  ::S/p 2.25L IV fluids in ED   ::Patient  required IV contrast for workup of sepsis in ED  -Daily RFPs     #Hypokalemia  ::K 2.9 on admission  ::Given oral potassium chloride in ED  -Daily RFPs  -Continue to replete as needed     #Elevated troponin  ::Troponin 184>603>105  ::EKG showed sinus tachycardia  ::  ::Likely related to demand ischemia iso hypotension     #HTN  ::Home meds: amlodipine 5 mg daily, lisinopril 20 mg daily, hydrochlorothiazide 25 mg daily  -Holding home antihypertensives due to drop in blood pressure while in ED     #Pulmonary nodules  ::Patient has a history of pulmonary nodules  ::New 5 mm nodule noted in R lung  -Repeat CT chest in 6 months recommended     F: PRN  E: PRN  N: Regular diet  A: PIV  DVT ppx: SCDs. Heparin on hold for drop in hgb  Bowel ppx: Miralax     Code Status: Full Code  Emergency contact: LEODAN ALAMO (Daughter) [phone]       Shira Moran MD

## 2024-12-18 NOTE — CARE PLAN
The patient's goals for the shift include Patient would like to feel better    The clinical goals for the shift include Pt will remain hds and free from injury during shift    Problem: Safety - Adult  Goal: Free from fall injury  Outcome: Progressing     Problem: Chronic Conditions and Co-morbidities  Goal: Patient's chronic conditions and co-morbidity symptoms are monitored and maintained or improved  Outcome: Progressing     Problem: Nutrition  Goal: Lab values WNL  Outcome: Progressing     Problem: Nutrition  Goal: Electrolytes WNL  Outcome: Progressing     Problem: Fall/Injury  Goal: Not fall by end of shift  Outcome: Progressing

## 2024-12-19 ENCOUNTER — PHARMACY VISIT (OUTPATIENT)
Dept: PHARMACY | Facility: CLINIC | Age: 74
End: 2024-12-19
Payer: MEDICARE

## 2024-12-19 ENCOUNTER — HOME HEALTH ADMISSION (OUTPATIENT)
Dept: HOME HEALTH SERVICES | Facility: HOME HEALTH | Age: 74
End: 2024-12-19
Payer: MEDICARE

## 2024-12-19 ENCOUNTER — DOCUMENTATION (OUTPATIENT)
Dept: HOME HEALTH SERVICES | Facility: HOME HEALTH | Age: 74
End: 2024-12-19
Payer: MEDICARE

## 2024-12-19 VITALS
HEART RATE: 93 BPM | OXYGEN SATURATION: 92 % | RESPIRATION RATE: 14 BRPM | BODY MASS INDEX: 28.01 KG/M2 | DIASTOLIC BLOOD PRESSURE: 83 MMHG | SYSTOLIC BLOOD PRESSURE: 146 MMHG | HEIGHT: 63 IN | TEMPERATURE: 98.6 F | WEIGHT: 158.07 LBS

## 2024-12-19 LAB
ALBUMIN SERPL BCP-MCNC: 3 G/DL (ref 3.4–5)
ANION GAP SERPL CALC-SCNC: 14 MMOL/L (ref 10–20)
BASOPHILS # BLD AUTO: 0.04 X10*3/UL (ref 0–0.1)
BASOPHILS NFR BLD AUTO: 0.5 %
BUN SERPL-MCNC: 6 MG/DL (ref 6–23)
CALCIUM SERPL-MCNC: 9 MG/DL (ref 8.6–10.6)
CHLORIDE SERPL-SCNC: 106 MMOL/L (ref 98–107)
CO2 SERPL-SCNC: 23 MMOL/L (ref 21–32)
CREAT SERPL-MCNC: 0.68 MG/DL (ref 0.5–1.05)
EGFRCR SERPLBLD CKD-EPI 2021: >90 ML/MIN/1.73M*2
EOSINOPHIL # BLD AUTO: 0.07 X10*3/UL (ref 0–0.4)
EOSINOPHIL NFR BLD AUTO: 0.8 %
ERYTHROCYTE [DISTWIDTH] IN BLOOD BY AUTOMATED COUNT: 21.8 % (ref 11.5–14.5)
GLUCOSE SERPL-MCNC: 81 MG/DL (ref 74–99)
HCT VFR BLD AUTO: 25 % (ref 36–46)
HGB BLD-MCNC: 7.7 G/DL (ref 12–16)
HYPOCHROMIA BLD QL SMEAR: NORMAL
IMM GRANULOCYTES # BLD AUTO: 0.05 X10*3/UL (ref 0–0.5)
IMM GRANULOCYTES NFR BLD AUTO: 0.6 % (ref 0–0.9)
LYMPHOCYTES # BLD AUTO: 1.57 X10*3/UL (ref 0.8–3)
LYMPHOCYTES NFR BLD AUTO: 17.7 %
MAGNESIUM SERPL-MCNC: 1.84 MG/DL (ref 1.6–2.4)
MCH RBC QN AUTO: 22.6 PG (ref 26–34)
MCHC RBC AUTO-ENTMCNC: 30.8 G/DL (ref 32–36)
MCV RBC AUTO: 74 FL (ref 80–100)
MONOCYTES # BLD AUTO: 0.56 X10*3/UL (ref 0.05–0.8)
MONOCYTES NFR BLD AUTO: 6.3 %
NEUTROPHILS # BLD AUTO: 6.58 X10*3/UL (ref 1.6–5.5)
NEUTROPHILS NFR BLD AUTO: 74.1 %
NRBC BLD-RTO: 0 /100 WBCS (ref 0–0)
OVALOCYTES BLD QL SMEAR: NORMAL
PHOSPHATE SERPL-MCNC: 4.1 MG/DL (ref 2.5–4.9)
PLATELET # BLD AUTO: 437 X10*3/UL (ref 150–450)
POLYCHROMASIA BLD QL SMEAR: NORMAL
POTASSIUM SERPL-SCNC: 3.6 MMOL/L (ref 3.5–5.3)
RBC # BLD AUTO: 3.4 X10*6/UL (ref 4–5.2)
RBC MORPH BLD: NORMAL
SODIUM SERPL-SCNC: 139 MMOL/L (ref 136–145)
WBC # BLD AUTO: 8.9 X10*3/UL (ref 4.4–11.3)

## 2024-12-19 PROCEDURE — RXMED WILLOW AMBULATORY MEDICATION CHARGE

## 2024-12-19 PROCEDURE — 36415 COLL VENOUS BLD VENIPUNCTURE: CPT | Performed by: STUDENT IN AN ORGANIZED HEALTH CARE EDUCATION/TRAINING PROGRAM

## 2024-12-19 PROCEDURE — 2500000002 HC RX 250 W HCPCS SELF ADMINISTERED DRUGS (ALT 637 FOR MEDICARE OP, ALT 636 FOR OP/ED)

## 2024-12-19 PROCEDURE — 83735 ASSAY OF MAGNESIUM: CPT | Performed by: STUDENT IN AN ORGANIZED HEALTH CARE EDUCATION/TRAINING PROGRAM

## 2024-12-19 PROCEDURE — 85025 COMPLETE CBC W/AUTO DIFF WBC: CPT | Performed by: STUDENT IN AN ORGANIZED HEALTH CARE EDUCATION/TRAINING PROGRAM

## 2024-12-19 PROCEDURE — 2500000004 HC RX 250 GENERAL PHARMACY W/ HCPCS (ALT 636 FOR OP/ED)

## 2024-12-19 PROCEDURE — 80069 RENAL FUNCTION PANEL: CPT | Performed by: STUDENT IN AN ORGANIZED HEALTH CARE EDUCATION/TRAINING PROGRAM

## 2024-12-19 PROCEDURE — 99238 HOSP IP/OBS DSCHRG MGMT 30/<: CPT

## 2024-12-19 PROCEDURE — 2500000001 HC RX 250 WO HCPCS SELF ADMINISTERED DRUGS (ALT 637 FOR MEDICARE OP)

## 2024-12-19 RX ORDER — SULFAMETHOXAZOLE AND TRIMETHOPRIM 800; 160 MG/1; MG/1
1 TABLET ORAL DAILY
Qty: 10 TABLET | Refills: 0 | Status: SHIPPED | OUTPATIENT
Start: 2024-12-19 | End: 2024-12-29

## 2024-12-19 RX ORDER — ALBUTEROL SULFATE 0.83 MG/ML
3 SOLUTION RESPIRATORY (INHALATION) ONCE
OUTPATIENT
Start: 2024-12-19 | End: 2024-12-19

## 2024-12-19 RX ORDER — ALBUTEROL SULFATE 90 UG/1
1 INHALANT RESPIRATORY (INHALATION) ONCE
OUTPATIENT
Start: 2024-12-19

## 2024-12-19 RX ORDER — VANCOMYCIN HYDROCHLORIDE 125 MG/1
125 CAPSULE ORAL 4 TIMES DAILY
Qty: 56 CAPSULE | Refills: 0 | Status: SHIPPED | OUTPATIENT
Start: 2024-12-30 | End: 2025-01-13

## 2024-12-19 RX ORDER — FLUTICASONE FUROATE AND VILANTEROL 200; 25 UG/1; UG/1
1 POWDER RESPIRATORY (INHALATION)
Qty: 60 EACH | Refills: 0 | Status: SHIPPED | OUTPATIENT
Start: 2024-12-20 | End: 2024-12-20

## 2024-12-19 ASSESSMENT — PAIN SCALES - GENERAL
PAINLEVEL_OUTOF10: 0 - NO PAIN

## 2024-12-19 ASSESSMENT — PAIN SCALES - PAIN ASSESSMENT IN ADVANCED DEMENTIA (PAINAD)
CONSOLABILITY: NO NEED TO CONSOLE
BREATHING: NORMAL
TOTALSCORE: 0
BODYLANGUAGE: RELAXED
FACIALEXPRESSION: SMILING OR INEXPRESSIVE

## 2024-12-19 ASSESSMENT — PAIN SCALES - WONG BAKER
WONGBAKER_NUMERICALRESPONSE: NO HURT

## 2024-12-19 NOTE — HH CARE COORDINATION
Home Care received a Referral for Physical Therapy. We have processed the referral for a Start of Care on 12/21/24.     If you have any questions or concerns, please feel free to contact us at [phone]. Follow the prompts, enter your five digit zip code, and you will be directed to your care team on [facility] 3.

## 2024-12-19 NOTE — DISCHARGE SUMMARY
Discharge Diagnosis  Community acquired pneumonia, unspecified laterality    Issues Requiring Follow-Up  -Please follow up with the primary care clinic regarding management of your severe anemia. We recommend that you get a colonoscopy to determine if you have a bleed inside your GI tract  -Please follow up with your primary care doctor regarding the new pulmonary nodule found in your right lung. We recommend that you get another CT of your lungs in 6 months to see if this nodule changes in size.   -Spirogram     Discharge Meds     Medication List      START taking these medications     fluticasone furoate-vilanteroL 200-25 mcg/dose inhaler; Commonly known   as: Breo Ellipta; Inhale 1 puff once daily.; Start taking on: December 20, 2024   sulfamethoxazole-trimethoprim 800-160 mg tablet; Commonly known as:   Bactrim DS; Take 1 tablet by mouth once daily for 10 days.   vancomycin 125 mg capsule; Commonly known as: Vancocin; Take 1 capsule   (125 mg) by mouth 4 times a day for 14 days. Do not fill before December 30, 2024.; Start taking on: December 30, 2024     CONTINUE taking these medications     amLODIPine 5 mg tablet; Commonly known as: Norvasc   hydroCHLOROthiazide 25 mg tablet; Commonly known as: HYDRODiuril   ibuprofen 800 mg tablet   lisinopril 20 mg tablet   MULTIVITAMIN ORAL       Test Results Pending At Discharge  Pending Labs       Order Current Status    Direct Antiglobulin Test Collected (12/11/24 1621)    Reticulocytes Collected (12/11/24 1621)    Ova and Parasite Examination In process    Ova/Para + Giardia/Cryptosporidium Antigen In process            Hospital Course  73 yo woman with a PMHx of pulmonary nodules, tobacco use, HTN, R breast cancer (s/p mastectomy 1993, s/p tamoxifen for 5 years), and recent appendicitis (s/p appendectomy 10/4/24) presenting on 12/11/24 with urinary incontinence, cough, and shortness of breath.    Per EMS, patient was hypoxic to the low 80s on room air and was  placed on 4L O2 with improvement to the mid 90s, as well as a HR in the 150s-160s. In the ED, the patient had vitals of T 39.5 C, , RR 22, /70, SpO2 93% on 4L O2. Labs were notable for WBC 20.2, hgb 6.3, MCV 66, K 2.8, bicarb 16, BUN 43, Cr 1.79 (vs baseline Cr 1.0), glucose 174, , trop 184>603>105, lactate 6.8.     The patient was started on 2.25L NS, PO potassium chloride, Tylenol, vancomycin and Zosyn. Home antihypertensives were held due to concern for sepsis. Repeat lactate was 0.4. Blood cultures were collected. Covid, RSV and flu panels were negative.     UA was positive for leukocyte esterase and WBCs. EKG showed sinus tachycardia. Chest x-ray showed increased lung markings bilaterally suggestive of possible atypical/viral pneumonia. CT angio chest showed no evidence of PEs, moderate centrilobular pulmonary edema, a new 5 mm pulmonary nodule, and diffuse bilateral interlobular septal thickening. CT A/P showed evidence of pyelonephritis and calcified uterine leiomyomas. Azithromycin was added to antibiotic course.    Blood cultures were positive for E. Coli sensitive to Bactrim, meropenem, ertapenem, and amikacin. Resistant to all other tested antibiotics. IV Zosyn was switched to IV meropenem. Stool ova and parasite test ordered due to continued diarrhea however was negative. Patient improved on antibiotics and was discharged home.     Issues to Follow Up:  -Severe microcytic anemia of unknown cause. Recommend colonoscopy as patient had a positive Cologuard in 10/2020  -New 5 mm pulmonary nodule seen in R lung on imaging 12/11/24. Recommend CT chest in 6 months    Pertinent Physical Exam At Time of Discharge  Physical Exam  Constitutional:       Appearance: Normal appearance.   HENT:      Head: Normocephalic and atraumatic.      Mouth/Throat:      Mouth: Mucous membranes are moist.      Pharynx: Oropharynx is clear.   Eyes:      Extraocular Movements: Extraocular movements intact.       Pupils: Pupils are equal, round, and reactive to light.   Cardiovascular:      Rate and Rhythm: Normal rate and regular rhythm.      Pulses: Normal pulses.      Heart sounds: Normal heart sounds.   Pulmonary:      Effort: Pulmonary effort is normal. No respiratory distress.      Breath sounds: Normal breath sounds.   Abdominal:      General: Bowel sounds are normal. There is no distension.      Palpations: Abdomen is soft.      Tenderness: There is minimal abdominal tenderness  Skin:     General: Skin is warm and dry.   Neurological:      Mental Status: She is alert and oriented to person, place, and time.   Psychiatric:         Mood and Affect: Mood normal.         Behavior: Behavior normal.     Outpatient Follow-Up  Future Appointments   Date Time Provider Department Center   12/23/2024  1:00 PM [id number] PFT  2 [id number]   1/2/2025  9:00 AM Pallavi Sharma, MD [id number]         Shira Moran MD   minute(s)

## 2024-12-19 NOTE — PROGRESS NOTES
12/19/24 1357   Discharge Planning   Home or Post Acute Services In home services   Type of Home Care Services Home PT;Home OT   Expected Discharge Disposition Home H   Does the patient need discharge transport arranged? No     Per team, patient will discharge today. Final home care orders sent to [facility]. Patient will discharge on oral antibiotics. Family will provide transportation home.    SOC confirmed for 12/21    Dolores Lynn RN, BSN  Transitional Care Coordinator

## 2024-12-19 NOTE — CARE PLAN
Problem: Safety - Adult  Goal: Free from fall injury  Outcome: Progressing     Problem: Discharge Planning  Goal: Discharge to home or other facility with appropriate resources  Outcome: Progressing     Problem: Chronic Conditions and Co-morbidities  Goal: Patient's chronic conditions and co-morbidity symptoms are monitored and maintained or improved  Outcome: Progressing     Problem: Nutrition  Goal: Oral intake greater than 50%  Outcome: Progressing  Goal: Oral intake greater 75%  Outcome: Progressing  Goal: Consume prescribed supplement  Outcome: Progressing  Goal: Adequate PO fluid intake  Outcome: Progressing  Goal: Nutrition support goals are met within 48 hrs  Outcome: Progressing  Goal: Nutrition support is meeting 75% of nutrient needs  Outcome: Progressing  Goal: Tube feed tolerance  Outcome: Progressing  Goal: Lab values WNL  Outcome: Progressing  Goal: Electrolytes WNL  Outcome: Progressing  Goal: Promote healing  Outcome: Progressing  Goal: Maintain stable weight  Outcome: Progressing  Goal: Reduce weight from edema/fluid  Outcome: Progressing  Goal: Gradual weight gain  Outcome: Progressing  Goal: Improve ostomy output  Outcome: Progressing     Problem: Fall/Injury  Goal: Not fall by end of shift  Outcome: Progressing  Goal: Be free from injury by end of the shift  Outcome: Progressing  Goal: Verbalize understanding of personal risk factors for fall in the hospital  Outcome: Progressing  Goal: Verbalize understanding of risk factor reduction measures to prevent injury from fall in the home  Outcome: Progressing  Goal: Use assistive devices by end of the shift  Outcome: Progressing  Goal: Pace activities to prevent fatigue by end of the shift  Outcome: Progressing   The patient's goals for the shift include Patient would like to feel better    The clinical goals for the shift include no falls this shift , no dificulty with breathing    Over the shift, the patient did not make progress toward the  following goals. Barriers to progression include c-diff. Recommendations to address these barriers include vanco.

## 2024-12-20 RX ORDER — FLUTICASONE FUROATE AND VILANTEROL 200; 25 UG/1; UG/1
1 POWDER RESPIRATORY (INHALATION)
Qty: 60 EACH | Refills: 0 | Status: SHIPPED | OUTPATIENT
Start: 2024-12-20

## 2024-12-23 ENCOUNTER — HOSPITAL ENCOUNTER (OUTPATIENT)
Dept: RESPIRATORY THERAPY | Facility: HOSPITAL | Age: 74
Discharge: HOME | End: 2024-12-23
Payer: MEDICARE

## 2024-12-23 ENCOUNTER — APPOINTMENT (OUTPATIENT)
Dept: RESPIRATORY THERAPY | Facility: HOSPITAL | Age: 74
End: 2024-12-23
Payer: MEDICARE

## 2024-12-23 DIAGNOSIS — J43.9 PULMONARY EMPHYSEMA, UNSPECIFIED EMPHYSEMA TYPE (MULTI): ICD-10-CM

## 2024-12-23 LAB
MGC ASCENT PFT - FEV1 - POST: 1.4
MGC ASCENT PFT - FEV1 - PRE: 1.34
MGC ASCENT PFT - FEV1 - PREDICTED: 2.05
MGC ASCENT PFT - FVC - POST: 1.96
MGC ASCENT PFT - FVC - PRE: 2.03
MGC ASCENT PFT - FVC - PREDICTED: 2.65

## 2024-12-23 PROCEDURE — 94726 PLETHYSMOGRAPHY LUNG VOLUMES: CPT

## 2024-12-23 PROCEDURE — 94060 EVALUATION OF WHEEZING: CPT | Performed by: STUDENT IN AN ORGANIZED HEALTH CARE EDUCATION/TRAINING PROGRAM

## 2024-12-23 PROCEDURE — 94726 PLETHYSMOGRAPHY LUNG VOLUMES: CPT | Performed by: STUDENT IN AN ORGANIZED HEALTH CARE EDUCATION/TRAINING PROGRAM

## 2024-12-23 PROCEDURE — 94729 DIFFUSING CAPACITY: CPT | Performed by: STUDENT IN AN ORGANIZED HEALTH CARE EDUCATION/TRAINING PROGRAM

## 2024-12-24 LAB — O+P STL MICRO: NEGATIVE

## 2024-12-30 ENCOUNTER — TELEPHONE (OUTPATIENT)
Dept: HOME HEALTH SERVICES | Facility: HOME HEALTH | Age: 74
End: 2024-12-30
Payer: MEDICARE

## 2024-12-30 NOTE — TELEPHONE ENCOUNTER
Hello,    Your recent home care referral for Megan Holt has been made a Non Admit with  Home Care due to Inability to Contact Patient. If you have further questions, feel free to reach out to our office at [phone].     Thank you,   [facility]

## 2025-01-02 ENCOUNTER — PHARMACY VISIT (OUTPATIENT)
Dept: PHARMACY | Facility: CLINIC | Age: 75
End: 2025-01-02
Payer: MEDICARE

## 2025-01-02 ENCOUNTER — OFFICE VISIT (OUTPATIENT)
Dept: PULMONOLOGY | Facility: HOSPITAL | Age: 75
End: 2025-01-02
Payer: MEDICARE

## 2025-01-02 VITALS
HEART RATE: 90 BPM | OXYGEN SATURATION: 98 % | TEMPERATURE: 96.3 F | SYSTOLIC BLOOD PRESSURE: 127 MMHG | DIASTOLIC BLOOD PRESSURE: 81 MMHG

## 2025-01-02 DIAGNOSIS — J44.9 CHRONIC OBSTRUCTIVE PULMONARY DISEASE, UNSPECIFIED COPD TYPE (MULTI): Primary | ICD-10-CM

## 2025-01-02 PROCEDURE — RXMED WILLOW AMBULATORY MEDICATION CHARGE

## 2025-01-02 PROCEDURE — 99215 OFFICE O/P EST HI 40 MIN: CPT | Performed by: STUDENT IN AN ORGANIZED HEALTH CARE EDUCATION/TRAINING PROGRAM

## 2025-01-02 RX ORDER — NICOTINE POLACRILEX 2 MG/1
LOZENGE ORAL
Qty: 200 LOZENGE | Refills: 2 | Status: SHIPPED | OUTPATIENT
Start: 2025-01-02

## 2025-01-02 RX ORDER — TIOTROPIUM BROMIDE 18 UG/1
1 CAPSULE ORAL; RESPIRATORY (INHALATION)
Qty: 30 CAPSULE | Refills: 5 | Status: SHIPPED | OUTPATIENT
Start: 2025-01-02 | End: 2025-07-01

## 2025-01-02 RX ORDER — ALBUTEROL SULFATE 90 UG/1
2 INHALANT RESPIRATORY (INHALATION) EVERY 4 HOURS PRN
Qty: 8.5 G | Refills: 5 | Status: SHIPPED | OUTPATIENT
Start: 2025-01-02 | End: 2026-01-02

## 2025-01-02 RX ORDER — DIPHENHYDRAMINE HCL 25 MG
4 CAPSULE ORAL AS NEEDED
Qty: 100 EACH | Refills: 3 | Status: SHIPPED | OUTPATIENT
Start: 2025-01-02 | End: 2025-02-01

## 2025-01-02 ASSESSMENT — ENCOUNTER SYMPTOMS
SEIZURES: 0
NECK PAIN: 0
BRUISES/BLEEDS EASILY: 0
NECK STIFFNESS: 0
WOUND: 0
ABDOMINAL PAIN: 0
COUGH: 0
PALPITATIONS: 0
DYSURIA: 0
CONSTIPATION: 0
DIZZINESS: 0
FLANK PAIN: 0
TROUBLE SWALLOWING: 0
CHEST TIGHTNESS: 0
VOICE CHANGE: 0
HEMATURIA: 0
FEVER: 0
JOINT SWELLING: 0
WHEEZING: 0
VOMITING: 0
NAUSEA: 0
DIARRHEA: 0
POLYDIPSIA: 0
SHORTNESS OF BREATH: 0
DIFFICULTY URINATING: 0
CHILLS: 0
BACK PAIN: 0
WEAKNESS: 0
BLOOD IN STOOL: 0
NUMBNESS: 0
UNEXPECTED WEIGHT CHANGE: 0

## 2025-01-02 ASSESSMENT — PAIN SCALES - GENERAL: PAINLEVEL_OUTOF10: 0-NO PAIN

## 2025-01-02 NOTE — PATIENT INSTRUCTIONS
Thank you for visiting the Pulmonary Clinic today. If you have questions or concerns, please call the Pulmonary Department at [phone]. For clinic appointments, please call [phone].     YOUR BREATHING MEDICATIONS:   -Please start spiriva once per day  -Please also use albuterol as needed up to 4 times per day for shortness of breath    DISCUSSION IN CLINIC:   -Please start using a new inhaler called Spiriva once every day  -You can also use an inhaler called albuterol (red colored) up to 4 times per day as needed  -Please call [phone] to schedule a walking test to make sure you do not need any oxygen  -Please call [phone] to schedule your repeat CT chest scan in 3 months, so we can make sure the nodules look okay!  -Great job quitting smoking! I ordered some gum and lozenges to help you keep away from the cigarettes     RETURN TO CLINIC: 3 months     Pallavi Sharma, MD  Pulmonary and Critical Care Fellow  Div of Pulmonary, Critical Care and Sleep Medicine

## 2025-01-02 NOTE — PROGRESS NOTES
Department of Medicine I Division of Pulmonary, Critical Care, and Sleep Medicine   [id number] [place] [address] [address] [place] [address]  Phone: [phone]  Fax: [phone]    History of Present Illness   Megan Holt is a 74 y.o. female with PMH pulmonary nodules current smoker, HTN, R breast ca s/p mastectomy in 1993, presenting to pulmonary clinic for follow up of nodules.    She was last seen 6/10/2024 by Dr. Sweeney at which point her PFT 8/2023 showed normal spirometry and lung volumes with low DLCO. Largest nodule on CT chest done 11/2022 for lung ca screening showed a 7mm nodule and repeat 6/2023 was unchanged. Plan was for follow up in 6 months and referral was made to smoking cessation counseling. She was also found to be anemic and colonoscopy was recommended.     She was recently admitted to the hospital with worsening anemia and colonoscopy was recommended again. She was found to have E.coli bacteremia and possible UTI. She improved with abx. She was found to have a new 5mm nodule on CT 12/2024. She was discharged with PCP follow up.    For her pulmonary symptoms, she is not currently taking any inhalers. She was supposed to be discharged with Breo but says she did not have it. She has quit smokin since October 2024 and reports having occasional urges to smoke so is requesting gum/lozenges but may have had some insurance issues when her PCP tried to order these for her. She otherwise reports no respiratory symptoms at this time. She denies any cough/phlegm, SOB, PLEITEZ, chest pressure, or hemoptysis. No fever/chills, B symptoms.        Review of Systems  Review of Systems   Constitutional:  Negative for chills, fever and unexpected weight change.   HENT:  Negative for congestion, drooling, postnasal drip, trouble swallowing and voice change.    Respiratory:  Negative for cough, chest tightness, shortness of breath and wheezing.    Cardiovascular:  Negative for chest pain, palpitations and leg  "swelling.   Gastrointestinal:  Negative for abdominal pain, blood in stool, constipation, diarrhea, nausea and vomiting.   Endocrine: Negative for cold intolerance, heat intolerance, polydipsia and polyuria.   Genitourinary:  Negative for difficulty urinating, dysuria, flank pain and hematuria.   Musculoskeletal:  Negative for back pain, joint swelling, neck pain and neck stiffness.   Skin:  Negative for rash and wound.   Neurological:  Negative for dizziness, seizures, syncope, weakness and numbness.   Hematological:  Does not bruise/bleed easily.         Past Medical History     Past Medical History:   Diagnosis Date    Breast cancer (Multi)     Current smoker 02/26/2024         Immunizations     Immunization History   Administered Date(s) Administered    Moderna SARS-CoV-2 Vaccination 05/13/2021, 06/10/2021, 02/16/2022       Medications and Allergies     Current Outpatient Medications   Medication Instructions    amLODIPine (NORVASC) 5 mg, oral, Daily    fluticasone furoate-vilanteroL (Breo Ellipta) 200-25 mcg/dose inhaler 1 puff, inhalation, Daily RT    hydroCHLOROthiazide (HYDRODIURIL) 25 mg, oral, Daily    ibuprofen 800 mg, oral, Every 8 hours PRN    lisinopril 20 mg, oral, Daily    MULTIVITAMIN ORAL 1 tablet, oral, Daily    vancomycin (Vancocin) 125 mg capsule Take 1 capsule (125 mg) by mouth 4 times a day for 14 days. Do not fill before December 30, 2024.        Allergies:  Patient has no allergy information on record.    Social History   She reports that she has been smoking cigarettes. She quit smokeless tobacco use about 4 weeks ago. She reports current alcohol use. She reports that she does not currently use drugs after having used the following drugs: Marijuana and \"Crack\" cocaine.    Current smoker - >50 years smoking 1ppd, now down to 5 cigarettes per day     Family History     Family History   Problem Relation Name Age of Onset    Breast cancer Sister           Surgical History   She has a past " surgical history that includes Other surgical history (03/24/2022) and Mastectomy.    Physical Exam     Vitals:    01/02/25 0841   BP: 127/81   Pulse: 90   Temp: 35.7 °C (96.3 °F)   SpO2: 98%          Physical Exam  Constitutional:       Appearance: She is not ill-appearing.   Cardiovascular:      Rate and Rhythm: Normal rate and regular rhythm.      Pulses: Normal pulses.      Heart sounds: Normal heart sounds. No murmur heard.  Pulmonary:      Effort: Pulmonary effort is normal. No respiratory distress.      Breath sounds: Normal breath sounds. No wheezing or rhonchi.   Chest:      Chest wall: No tenderness.   Abdominal:      General: Abdomen is flat. Bowel sounds are normal. There is no distension.      Palpations: Abdomen is soft.      Tenderness: There is no abdominal tenderness. There is no guarding or rebound.   Musculoskeletal:         General: No swelling or tenderness.      Right lower leg: No edema.      Left lower leg: No edema.   Skin:     General: Skin is warm and dry.   Neurological:      General: No focal deficit present.      Mental Status: She is alert and oriented to person, place, and time. Mental status is at baseline.         Results   Pulmonary Function Tests:  PFT: 8/17/2023  normal spirometry & lung volumes. DLCO 38% (uncorrected)    PFT: 12/23/2024  Normal spirometry, no BD response. Moderate restriction (72% TLC), low DLCO    6MWT: 8/17/2023  walked 317 meters, SpO2 99% >> 95%     Chest Radiograph:  XR chest 1 view 12/11/2024    Impression  1. Increased lung markings bilaterally. Atypical/viral pneumonia is  highly differential. A component of mild edema may be present as well      Chest CT Scan:  CT angio chest for pulmonary embolism 12/11/2024      Impression  1. No evidence of acute pulmonary embolism.  2. Diffuse bilateral interlobular septal thickening with asymmetric  prominence towards right. In part, findings may be secondary to low  lung volumes/poor inspiratory effort; however,  pulmonary edema and/or  superimposed atypical infection cannot be excluded.  3. Moderate centrilobular predominant emphysema.  4. Interval development of a 5 mm pulmonary nodule within the right  middle lobe. Recommend follow-up CT of the chest in 6 months to  evaluate for stability  5. Other non-acute, chronic findings as described above.         ECHO:  No results found for this or any previous visit from the past 365 days.       Labs:  Lab Results   Component Value Date    WBC 8.9 12/19/2024    HGB 7.7 (L) 12/19/2024    HCT 25.0 (L) 12/19/2024    MCV 74 (L) 12/19/2024     12/19/2024       Lab Results   Component Value Date    CREATININE 0.68 12/19/2024    BUN 6 12/19/2024     12/19/2024    K 3.6 12/19/2024     12/19/2024    CO2 23 12/19/2024        Lab Results   Component Value Date    SEDRATE 17 04/16/2018        IgE: None  Respiratory Allergy Panel: None  AEC:   Eosinophils Absolute (x10*3/uL)   Date Value   12/19/2024 0.07     Eosinophils Absolute, Manual (x10*3/uL)   Date Value   12/15/2024 0.10     Eosinophils %, Manual (%)   Date Value   12/15/2024 0.9     Eosinophils % (%)   Date Value   12/19/2024 0.8       Cultures    Susceptibility data from last 90 days.  Collected Specimen Info Organism Amikacin Ampicillin Aztreonam Cefazolin Cefepime Cefotaxime Ceftazidime Ceftriaxone Cefuroxime (oral) Ciprofloxacin Ertapenem Gentamicin Levofloxacin Meropenem   12/13/24 Blood culture from Peripheral Venipuncture Escherichia coli  I  R  R  R  R  R  R  R  R  R  S  R  R  S   12/11/24 Blood culture from Peripheral Venipuncture Escherichia coli                 12/11/24 Blood culture from Peripheral Venipuncture Escherichia coli  S  R  R  R  R  R  R  R  R  R  S  R  R  S     Collected Specimen Info Organism Piperacillin/Tazobactam Tobramycin Trimethoprim/Sulfamethoxazole   12/13/24 Blood culture from Peripheral Venipuncture Escherichia coli  S  R  S   12/11/24 Blood culture from Peripheral Venipuncture  Escherichia coli      12/11/24 Blood culture from Peripheral Venipuncture Escherichia coli  R  R  S        Assessment and Plan     Megan Holt is a 74 y.o. female with PMH pulmonary nodules current smoker, HTN, R breast ca s/p mastectomy in 1993, presenting to pulmonary clinic for follow up of nodules.    She had a CT chest about 1.5 years ago and then another one recently with her hospital stay which do show increasing interstitial and ground glass process. The nodule is 5mm and small but given other lung changes, she should have repeat CT in 3 months to ensure resolution of these other findings. She also is not currently on any inhalers but should at least be on spiriva, though with restriction and no worsening symptoms unclear if this will be of significant benefit. Would definitely take her off of steroid inhalers at this time.     #moderate restriction   #emphysema  #possible CPFE  #hx smoking, quit 10/2024  #lung nodules    -repeat PFT 12/2024 with moderate restriction and emphysema, likely CPFE is present   -will schedule 6MWT  -repeat CT 12/2024 with stable nodules and 1 new 5mm nodule along with worsening interstitial and ground glass changes - will repeat CT chest in 3 months to assess for resolution   -currently on Breo after discharge - switch to Spiriva + albuterol PRN. No current need for ICS given no hx asthma  -congratulated on quitting smoking. Ordered gum and lozenges to help her maintain remission     Follow-up:  3 months after CT chest    Pallavi Sharma, MD

## 2025-02-19 ENCOUNTER — PHARMACY VISIT (OUTPATIENT)
Dept: PHARMACY | Facility: CLINIC | Age: 75
End: 2025-02-19
Payer: MEDICARE

## 2025-02-19 PROCEDURE — RXMED WILLOW AMBULATORY MEDICATION CHARGE

## 2025-02-28 ENCOUNTER — OFFICE VISIT (OUTPATIENT)
Dept: OBSTETRICS AND GYNECOLOGY | Facility: HOSPITAL | Age: 75
End: 2025-02-28
Payer: MEDICARE

## 2025-02-28 VITALS
WEIGHT: 148 LBS | HEIGHT: 63 IN | BODY MASS INDEX: 26.22 KG/M2 | SYSTOLIC BLOOD PRESSURE: 131 MMHG | DIASTOLIC BLOOD PRESSURE: 79 MMHG

## 2025-02-28 DIAGNOSIS — D25.9 UTERINE LEIOMYOMA, UNSPECIFIED LOCATION: Primary | ICD-10-CM

## 2025-02-28 DIAGNOSIS — N81.4 PROLAPSE OF UTERUS: ICD-10-CM

## 2025-02-28 PROCEDURE — 3008F BODY MASS INDEX DOCD: CPT | Performed by: NURSE PRACTITIONER

## 2025-02-28 PROCEDURE — 1159F MED LIST DOCD IN RCRD: CPT | Performed by: NURSE PRACTITIONER

## 2025-02-28 PROCEDURE — 99214 OFFICE O/P EST MOD 30 MIN: CPT | Performed by: NURSE PRACTITIONER

## 2025-02-28 PROCEDURE — 99204 OFFICE O/P NEW MOD 45 MIN: CPT | Performed by: NURSE PRACTITIONER

## 2025-02-28 PROCEDURE — 1126F AMNT PAIN NOTED NONE PRSNT: CPT | Performed by: NURSE PRACTITIONER

## 2025-02-28 RX ORDER — CONJUGATED ESTROGENS 0.62 MG/G
0.5 CREAM VAGINAL 2 TIMES WEEKLY
Qty: 12 G | Refills: 3 | Status: SHIPPED | OUTPATIENT
Start: 2025-03-03 | End: 2026-03-03

## 2025-02-28 ASSESSMENT — PAIN SCALES - GENERAL: PAINLEVEL_OUTOF10: 0-NO PAIN

## 2025-02-28 ASSESSMENT — ENCOUNTER SYMPTOMS
RECTAL PAIN: 0
CONSTIPATION: 0
DIARRHEA: 1
ABDOMINAL DISTENTION: 0
DIFFICULTY URINATING: 0
FREQUENCY: 0

## 2025-02-28 NOTE — PROGRESS NOTES
Subjective   Patient ID: Megan Holt is a 74 y.o. female who presents for vaginal lump (Pt concerned about lump on vaginal  area, noticed 1 month ago. /Referred by Dr. 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  HPI  74-year-old female here today referred by her [facility] provider with a complaint of lumps in her vagina and a history of a fibroid uterus.      Patient reports she went to her provider and told her that she felt a lump in her vaginal area and was concerned she had a cyst or some other problem.  The area is tender but not interfering with urination or bowel movements.    Patient's history reviewed and has been challenging over the past few years with an appendectomy, pneumonia, and C. difficile.    Patient is given birth to 4 children and she states that none of them were over 7 pounds.  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  She does have a partner but they have not been sexually active for approximately 6 months.  Review of Systems   Gastrointestinal:  Positive for diarrhea. Negative for abdominal distention, constipation and rectal pain.   Genitourinary:  Positive for pelvic pain. Negative for difficulty urinating, frequency and vaginal bleeding.   All other systems reviewed and are negative.      Objective   Physical Exam  Constitutional:       Appearance: She is normal weight.   Genitourinary:     Exam position: Lithotomy position.      Labia:         Right: No rash.         Left: No rash.       Vagina: Prolapsed vaginal walls present.      Uterus: Enlarged, tender and with uterine prolapse.       Adnexa:         Right: Tenderness present.         Left: Tenderness present.       Comments: Cervix noted at introitus.  Uterine prolapse with enlarged uterus  Musculoskeletal:      Cervical back: Normal range of motion.   Neurological:      Mental Status: She is alert.   Psychiatric:         Mood and Affect: Mood normal.         Assessment/Plan    Problem List Items Addressed This Visit    None  Visit Diagnoses         Codes    Uterine leiomyoma, unspecified location    -  Primary D25.9    Relevant Orders    US PELVIS TRANSABDOMINAL WITH TRANSVAGINAL    Prolapse of uterus     N81.4    Relevant Medications    estrogens, conjugated, (Premarin) vaginal cream (Start on 3/3/2025)    Other Relevant Orders    Referral to Urogynecology                 ELIEZER Rodriguez-CNP 02/28/25 11:20 AM

## 2025-03-05 ENCOUNTER — HOSPITAL ENCOUNTER (OUTPATIENT)
Dept: RADIOLOGY | Facility: HOSPITAL | Age: 75
Discharge: HOME | End: 2025-03-05
Payer: MEDICARE

## 2025-03-05 DIAGNOSIS — D25.9 UTERINE LEIOMYOMA, UNSPECIFIED LOCATION: ICD-10-CM

## 2025-03-05 PROCEDURE — 76856 US EXAM PELVIC COMPLETE: CPT

## 2025-03-07 ENCOUNTER — TELEPHONE (OUTPATIENT)
Dept: OBSTETRICS AND GYNECOLOGY | Facility: HOSPITAL | Age: 75
End: 2025-03-07

## 2025-03-25 ENCOUNTER — PHARMACY VISIT (OUTPATIENT)
Dept: PHARMACY | Facility: CLINIC | Age: 75
End: 2025-03-25
Payer: MEDICARE

## 2025-03-25 PROCEDURE — RXMED WILLOW AMBULATORY MEDICATION CHARGE

## 2025-03-26 ENCOUNTER — OFFICE VISIT (OUTPATIENT)
Dept: OBSTETRICS AND GYNECOLOGY | Facility: CLINIC | Age: 75
End: 2025-03-26
Payer: MEDICARE

## 2025-03-26 VITALS
BODY MASS INDEX: 25.13 KG/M2 | HEIGHT: 64 IN | WEIGHT: 147.2 LBS | HEART RATE: 96 BPM | SYSTOLIC BLOOD PRESSURE: 117 MMHG | DIASTOLIC BLOOD PRESSURE: 76 MMHG

## 2025-03-26 DIAGNOSIS — N81.4 PROLAPSE OF UTERUS: Primary | ICD-10-CM

## 2025-03-26 DIAGNOSIS — N39.0 RECURRENT UTI: ICD-10-CM

## 2025-03-26 LAB
POC APPEARANCE, URINE: CLEAR
POC BILIRUBIN, URINE: NEGATIVE
POC BLOOD, URINE: NEGATIVE
POC COLOR, URINE: YELLOW
POC GLUCOSE, URINE: NEGATIVE MG/DL
POC KETONES, URINE: NEGATIVE MG/DL
POC LEUKOCYTES, URINE: ABNORMAL
POC NITRITE,URINE: POSITIVE
POC PH, URINE: 6 PH
POC PROTEIN, URINE: ABNORMAL MG/DL
POC SPECIFIC GRAVITY, URINE: 1.02
POC UROBILINOGEN, URINE: 0.2 EU/DL

## 2025-03-26 PROCEDURE — 99213 OFFICE O/P EST LOW 20 MIN: CPT | Performed by: NURSE PRACTITIONER

## 2025-03-26 PROCEDURE — 81003 URINALYSIS AUTO W/O SCOPE: CPT | Mod: QW | Performed by: NURSE PRACTITIONER

## 2025-03-26 ASSESSMENT — ENCOUNTER SYMPTOMS
MUSCULOSKELETAL NEGATIVE: 1
GASTROINTESTINAL NEGATIVE: 1
PSYCHIATRIC NEGATIVE: 1
CARDIOVASCULAR NEGATIVE: 1
DIFFICULTY URINATING: 1
EYES NEGATIVE: 1
CONSTITUTIONAL NEGATIVE: 1
ENDOCRINE NEGATIVE: 1
RESPIRATORY NEGATIVE: 1
NEUROLOGICAL NEGATIVE: 1

## 2025-03-26 ASSESSMENT — PAIN SCALES - GENERAL: PAINLEVEL_OUTOF10: 0-NO PAIN

## 2025-03-26 NOTE — PROGRESS NOTES
Referring Physician: Nuzhat Santo APRN*     General medical background:  Admission from ED on 2024 for community acquired pneumonia    Obstetric/Gynecologic History:  Megan Holt has a history of : 4. Para: 4. No history of  section(s).     Past Evaluation and Treatment:  Past evaluation includes: This problem has not been previously evaluated  Past treatment includes: This problem has not been previously treated    Review of Systems   Constitutional: Negative.    HENT: Negative.     Eyes: Negative.    Respiratory: Negative.     Cardiovascular: Negative.    Gastrointestinal: Negative.    Endocrine: Negative.    Genitourinary:  Positive for difficulty urinating and urgency.   Musculoskeletal: Negative.    Neurological: Negative.    Psychiatric/Behavioral: Negative.          HPI  75 year old female presenting as a new patient referral from Nuzhat Santo APRN* for evaluation of uterine prolapse. She recently saw ELIEZER Vasquez in OBGYN on 2025 and her uterus was enlarged upon exam and ordered pelvic U/S for further evaluation.   Her daughter is with her.    Record Review:   - 3/5/2025 pelvic U/S IMPRESSION:   1. The uterus measures 4.80 cm x 4.64 cm x 8.37 cm.   2. Uterus with calcified leiomyomas as described above limiting evaluation of the uterine parenchyma.  3. Nonvisualization of the bilateral ovaries with limited evaluation of the bilateral adnexa due to extensive shadowing.    Prolapse Symptoms:  - The patient reports having a vaginal bulge that became symptomatic in January-2025.   - Recently hospitalized in 2024 for C. Diff and pneumonia.   - She has a hx of uterine fibroids.     Urinary Symptoms:  - Patient endorses urinary urgency and does not respond fast enough so she experience small volume UUI on her way to the bathroom occasionally. She wears panty liners for this sometimes.   - Nocturia 2x/night.   - She reports difficulty urinating described as  urinary spraying due to the size of her POP.  - No episodes of gross hematuria.    - Drinks 1 cup of coffee in the morning and the remainder of her fluids is drinking water during the day.     OBGYN History and Symptoms:  - , x4   - Postmenopausal and denies any episodes of PMB.   - Personal hx of breat cancer s/p R mastectomy.   - She is not currently sexually active due to her vaginal prolapse. She would prefer to return to sexual intercourse after her POP is better managed.     Social History:  - She lives alone  - Occasional minimal smoker 1-2 cigarettes per day.     Testing results:  PVR results are available and reviewed:  8mL by bladder U/S  UA results available and reviewed    Laboratory:   Urine dipstick shows +leukocytes, +nitrites, +protein.        Physical Exam:   Physical Exam  Constitutional:       General: She is not in acute distress.     Appearance: Normal appearance.   Genitourinary:      Vulva, bladder and urethral meatus normal.      Genitourinary Comments: No excoriations over the cervix.       Right Labia: No lesions.     Left Labia: No lesions.     No vaginal discharge, bleeding or ulceration.      Apical vaginal prolapse present.     Mild vaginal atrophy present.       Right Adnexa: not tender and no mass present.     Left Adnexa: not tender and no mass present.     No cervical motion tenderness.      Uterus is not enlarged, fixed or tender.      No uterine mass detected.     No urethral tenderness or mass present.      Bladder is tender.   POP-Q measurements were:      Aa: Ba: C: +3     gH: pB: TVL:      Ap: Bp: D:      Pelvic exam was performed with patient standing.   HENT:      Head: Normocephalic and atraumatic.   Pulmonary:      Effort: Pulmonary effort is normal.   Psychiatric:         Mood and Affect: Mood normal.         Behavior: Behavior normal.         Thought Content: Thought content normal.         Judgment: Judgment normal.          Assessment and Plan  75 year old  female with Marc and stage 3 uterine prolapse. Comorbidities include: Hx of breast cancer s/p right mastectomy, nicotine dependence, HTN, and asthma.     Diagnoses:  #1 Recurrent UTI  #2 Uterine prolapse, stage 3    Plan:  1. Uterine prolapse, stage 3  - PVR = 8mL by bladder U/S.  - Upon exam today her Pop-Q demonstrated C: +3 with standing straining exam. This is consistent with an isolated uterine prolapse.   - Reviewed risk factors, natural history and etiology of prolapse.   - Discussed management options for prolapse including expectant management, PFPT, pessary fitting, and surgery.   - We discussed that PFPT would help strengthen the PF muscles with an overall goal to help improve the symptoms of the vaginal bulge and prevent POP from worsening over time.   - We discussed the benefits of fitting her with a pessary which is a small flexible silicone ring that is placed intravaginally to help support the pelvic organs to prevent the symptomatic vaginal bulge. There is no increased risk of UTI or vaginal infections with using a pessary. She may learn how to manage the pessary at home on her own with taking the pessary in/out prior to intercourse and recommended maintenance at least every 2 weeks or she may return to clinic every 3 months for us to perform her pessary maintenance.   - We discussed there are different POP repair surgical approaches to consider given that she wishes to maintain the ability to be sexually active in the future such as laparoscopic abdominal surgery that involves placing polypropylene mesh (i.e. SCP + DOUGLAS) or a native tissue repair that does not involve mesh but rather uses sutures with the intent of the body to scar over to suspend the pelvic organs that can be performed with or without a hysterectomy (i.e. USLS +/- TVH and APR). For either surgery, it involves 6 weeks of postoperative pelvic rest and no heavy lifting > 5-10 pounds.   > She is interested in a POP surgical repair and  will return to discuss surgical approach options with Dr. Israel.     2. Marc  - POCT UA with +nitrites.   - Upon exam today her bladder was tender to palpate and the cystitis is probably due to having an acute UTI.   - We are sending her urine sample for culture to assess the type of bacteria and antibiotic sensitives.     RTC for the first available appointment slot with Dr. Israel for a POP repair surgical consultation.     Scribe Attestation  By signing my name below, I, Jose Maria Hatch, attest that this documentation has been prepared under the direction and in the presence of LEONIDES Zapien on 03/26/2025 at 3:18 PM.   I, LEONIDES Zapien, personally performed the services described in this documentation which was scribed virtually and I confirm that it is both accurate and complete.       LEONIDES Zapien

## 2025-03-29 LAB — BACTERIA UR CULT: ABNORMAL

## 2025-03-31 DIAGNOSIS — N39.0 RECURRENT UTI: Primary | ICD-10-CM

## 2025-03-31 RX ORDER — SULFAMETHOXAZOLE AND TRIMETHOPRIM 800; 160 MG/1; MG/1
1 TABLET ORAL 2 TIMES DAILY
Qty: 10 TABLET | Refills: 0 | Status: SHIPPED | OUTPATIENT
Start: 2025-03-31 | End: 2025-04-01 | Stop reason: SDUPTHER

## 2025-04-01 ENCOUNTER — PHARMACY VISIT (OUTPATIENT)
Dept: PHARMACY | Facility: CLINIC | Age: 75
End: 2025-04-01
Payer: MEDICARE

## 2025-04-01 ENCOUNTER — TELEPHONE (OUTPATIENT)
Dept: OBSTETRICS AND GYNECOLOGY | Facility: CLINIC | Age: 75
End: 2025-04-01
Payer: MEDICARE

## 2025-04-01 DIAGNOSIS — N39.0 RECURRENT UTI: ICD-10-CM

## 2025-04-01 PROCEDURE — RXMED WILLOW AMBULATORY MEDICATION CHARGE

## 2025-04-01 RX ORDER — SULFAMETHOXAZOLE AND TRIMETHOPRIM 800; 160 MG/1; MG/1
1 TABLET ORAL 2 TIMES DAILY
Qty: 10 TABLET | Refills: 0 | Status: SHIPPED | OUTPATIENT
Start: 2025-04-01 | End: 2025-04-06

## 2025-04-01 NOTE — TELEPHONE ENCOUNTER
Asked that sulfamethoxazole 800 mg-trimethoprim 160 mg tablet (Bactrim DS) prescription be redirected to  [facility].

## 2025-04-01 NOTE — TELEPHONE ENCOUNTER
Request received for   Requested Prescriptions     Pending Prescriptions Disp Refills    sulfamethoxazole-trimethoprim (Bactrim DS) 800-160 mg tablet 10 tablet 0     Sig: Take 1 tablet by mouth 2 times a day for 5 days.       Megan Yanet was last seen 3/26/2025.

## 2025-04-01 NOTE — TELEPHONE ENCOUNTER
Result Communication    Resulted Orders   POCT UA Automated manually resulted   Result Value Ref Range    POC Color, Urine Yellow Straw, Yellow, Light-Yellow    POC Appearance, Urine Clear Clear    POC Glucose, Urine NEGATIVE NEGATIVE mg/dl    POC Bilirubin, Urine NEGATIVE NEGATIVE    POC Ketones, Urine NEGATIVE NEGATIVE mg/dl    POC Specific Gravity, Urine 1.025 1.005 - 1.035    POC Blood, Urine NEGATIVE NEGATIVE    POC PH, Urine 6.0 No Reference Range Established PH    POC Protein, Urine TRACE (A) NEGATIVE mg/dl    POC Urobilinogen, Urine 0.2 0.2, 1.0 EU/DL    Poc Nitrite, Urine POSITIVE (A) NEGATIVE    POC Leukocytes, Urine TRACE (A) NEGATIVE   Urine Culture   Result Value Ref Range    CULTURE, URINE, ROUTINE SEE NOTE (A)       Comment:          CULTURE, URINE, ROUTINE         Micro Number:      [id number]    Test Status:       Final    Specimen Source:   Urine, clean catch    Specimen Quality:  Adequate    Result:            50,000-100,000 CFU/mL of Klebsiella pneumoniae                              K.pneumoniae                            ----------------                            INT   PATTI     AMOX/CLAVULANATE       S     4     AMP/SULBACTAM          S     4     CEFAZOLIN              NR    <=4 **2     CEFEPIME               S     <=0.12     CEFTAZIDIME            S     <=1     CEFTRIAXONE            S     <=0.25     CIPROFLOXACIN          S     <=0.06     GENTAMICIN             S     <=1     IMIPENEM               S     <=0.25     LEVOFLOXACIN           S     <=0.12     MEROPENEM              S     <=0.25     NITROFURANTOIN         S     32     PIP/TAZOBACTAM         S     <=4     TRIMETHOPRIM/SULFA     S     <=20    S = Susceptible  I = Intermediate  R = Resistant  NS = Not susceptible  SDD = Susceptible Dose Dependent   * = Not Tested  NR = Not Reported  **NN = See Therapy Comments      THERAPY COMMENTS        Note 1:      For infections other than uncomplicated UTI      caused by E. coli, K. pneumoniae or P.  mirabilis:      Cefazolin is resistant if PATTI > or = 8 mcg/mL.      (Distinguishing susceptible versus intermediate      for isolates with PATTI < or = 4 mcg/mL requires      additional testing.)        Note 2:      For uncomplicated UTI caused by E. coli,      K. pneumoniae or P. mirabilis: Cefazolin is      susceptible if PATTI <32 mcg/mL and predicts      susceptible to the oral agents cefaclor, cefdinir,      cefpodoxime, cefprozil, cefuroxime, cephalexin      and loracarbef.         5:15 PM      Results were successfully communicated with the patient and they acknowledged their understanding.    Megan Holt informed of urine culture results and the generic form of Antibiotic: Bactrim was sent to local pharmacy per Maru Sales CNP. Questions answered and understanding verbalized.

## 2025-04-01 NOTE — TELEPHONE ENCOUNTER
----- Message from Maru Sales sent at 3/31/2025  4:59 PM EDT -----  UTI, will treat with Bactrim, sent to pharmacy. Please call and tel her

## 2025-04-29 ENCOUNTER — PHARMACY VISIT (OUTPATIENT)
Dept: PHARMACY | Facility: CLINIC | Age: 75
End: 2025-04-29
Payer: MEDICARE

## 2025-04-29 PROCEDURE — RXMED WILLOW AMBULATORY MEDICATION CHARGE

## 2025-06-02 ENCOUNTER — OFFICE VISIT (OUTPATIENT)
Dept: OBSTETRICS AND GYNECOLOGY | Facility: CLINIC | Age: 75
End: 2025-06-02
Payer: MEDICARE

## 2025-06-02 ENCOUNTER — PREP FOR PROCEDURE (OUTPATIENT)
Dept: OBSTETRICS AND GYNECOLOGY | Facility: CLINIC | Age: 75
End: 2025-06-02

## 2025-06-02 VITALS
BODY MASS INDEX: 25.51 KG/M2 | WEIGHT: 149.4 LBS | HEIGHT: 64 IN | DIASTOLIC BLOOD PRESSURE: 79 MMHG | HEART RATE: 76 BPM | SYSTOLIC BLOOD PRESSURE: 128 MMHG

## 2025-06-02 DIAGNOSIS — D21.9 FIBROIDS: ICD-10-CM

## 2025-06-02 DIAGNOSIS — N81.4 UTEROVAGINAL PROLAPSE: Primary | ICD-10-CM

## 2025-06-02 DIAGNOSIS — N81.4 PROLAPSE OF UTERUS: ICD-10-CM

## 2025-06-02 DIAGNOSIS — N39.41 URGE INCONTINENCE OF URINE: ICD-10-CM

## 2025-06-02 DIAGNOSIS — R10.9 ABDOMINAL PAIN, UNSPECIFIED ABDOMINAL LOCATION: ICD-10-CM

## 2025-06-02 DIAGNOSIS — N81.4 CYSTOCELE WITH UTERINE PROLAPSE: Primary | ICD-10-CM

## 2025-06-02 LAB
POC APPEARANCE, URINE: CLEAR
POC BILIRUBIN, URINE: NEGATIVE
POC BLOOD, URINE: NEGATIVE
POC COLOR, URINE: YELLOW
POC GLUCOSE, URINE: NEGATIVE MG/DL
POC KETONES, URINE: NEGATIVE MG/DL
POC LEUKOCYTES, URINE: NEGATIVE
POC NITRITE,URINE: NEGATIVE
POC PH, URINE: 6 PH
POC PROTEIN, URINE: ABNORMAL MG/DL
POC SPECIFIC GRAVITY, URINE: 1.02
POC UROBILINOGEN, URINE: 0.2 EU/DL

## 2025-06-02 PROCEDURE — 1159F MED LIST DOCD IN RCRD: CPT | Performed by: OBSTETRICS & GYNECOLOGY

## 2025-06-02 PROCEDURE — 99215 OFFICE O/P EST HI 40 MIN: CPT | Performed by: OBSTETRICS & GYNECOLOGY

## 2025-06-02 PROCEDURE — 51798 US URINE CAPACITY MEASURE: CPT | Performed by: OBSTETRICS & GYNECOLOGY

## 2025-06-02 PROCEDURE — 99215 OFFICE O/P EST HI 40 MIN: CPT | Mod: 25 | Performed by: OBSTETRICS & GYNECOLOGY

## 2025-06-02 PROCEDURE — 1125F AMNT PAIN NOTED PAIN PRSNT: CPT | Performed by: OBSTETRICS & GYNECOLOGY

## 2025-06-02 PROCEDURE — 81003 URINALYSIS AUTO W/O SCOPE: CPT | Mod: QW | Performed by: OBSTETRICS & GYNECOLOGY

## 2025-06-02 RX ORDER — GABAPENTIN 600 MG/1
600 TABLET ORAL ONCE
OUTPATIENT
Start: 2025-06-02 | End: 2025-06-02

## 2025-06-02 RX ORDER — CELECOXIB 400 MG/1
400 CAPSULE ORAL ONCE
OUTPATIENT
Start: 2025-06-02 | End: 2025-06-02

## 2025-06-02 RX ORDER — PHENAZOPYRIDINE HYDROCHLORIDE 200 MG/1
200 TABLET, FILM COATED ORAL ONCE
OUTPATIENT
Start: 2025-06-02 | End: 2025-06-02

## 2025-06-02 RX ORDER — ACETAMINOPHEN 325 MG/1
975 TABLET ORAL ONCE
OUTPATIENT
Start: 2025-06-02 | End: 2025-06-02

## 2025-06-02 ASSESSMENT — ENCOUNTER SYMPTOMS
DEPRESSION: 0
PSYCHIATRIC NEGATIVE: 1
MUSCULOSKELETAL NEGATIVE: 1
EYES NEGATIVE: 1
RESPIRATORY NEGATIVE: 1
CARDIOVASCULAR NEGATIVE: 1
NEUROLOGICAL NEGATIVE: 1
OCCASIONAL FEELINGS OF UNSTEADINESS: 0
ABDOMINAL PAIN: 1
LOSS OF SENSATION IN FEET: 0
ENDOCRINE NEGATIVE: 1
CONSTITUTIONAL NEGATIVE: 1

## 2025-06-02 ASSESSMENT — PAIN SCALES - GENERAL: PAINLEVEL_OUTOF10: 6

## 2025-06-02 NOTE — LETTER
Marichuy 3, 2025     [place]  [address] [address]  Department Of Medicine-Pulmonary  [facility] [address]    Patient: Megan Holt   YOB: 1950   Date of Visit: 6/2/2025       Dear Dr. Pallavi Sharma, MD:    Thank you for referring Megan Holt to me for evaluation. Below are my notes for this consultation.  If you have questions, please do not hesitate to call me. I look forward to following your patient along with you.       Sincerely,     Susana Israel MD      CC: No Recipients  ______________________________________________________________________________________    Urogynecology  Provider:  Susana Israel MD  [phone]    ASSESSMENT AND PLAN:   75 year old female with MPP, abdominal pain, and stage 3 uterovaginal prolapse. Comorbidities include: COPD, HTN, personal hx of R breast cancer s/p mastectomy, hx of laparoscopic appendectomy on 10/4/2024, and personal hx of smoking >20 pack years.   Accompanied by her daughter today.    Diagnoses:  #1 Uterovaginal prolapse, stage 3  #2 Abdominal pain  #3 Myofascial pelvic pain   #4 Uterine fibroids     Plan:  1. Uterovaginal prolapse, stage 3  - PVR = 120mL by bladder U/S.  - Plan to order imaging to assess her abdominal pain prior to counseling about POP surgical repair options.   - Reassured that POP does not cause abdominopelvic pain like she is currently experiencing.   - Recommended UDS to evaluate for any presence of occult RADHA to determine any need for an anti-incontinence procedure (I.e. TVT or Bulkamid) at the time of her POP repair surgery.   - We recommended BSO at the time of her TLH to reduce the risk of developing ovarian cancer in the future.   > Patient may need pulmonology clearance due to having a pulmonary nodule in the setting of her having COPD emphysema. Of note, the patient is planning to see new pulmonologist on 6/6/2025.  - Combined case surgical OR request sent for Dr. Miranda to complete TLH and BSO with   Jhonatan to complete USLS vs sacrocolpopexy (patient discussion on these options after CT scan results), APR, perineorrhaphy, +/- TVT (based on UDN testing and patient preference), and cystoscopy @ [facility].     2. Abdominal pain, MPP, hx of uterine fibroids, hx of lx appendectomy on 10/4/2024  - Upon exam she had severe tenderness with palpation to the right of the ectocervix. There is a large fibroid present. Tenderness with motion of the uterus and cervix. The PF muscles are diffusely short, hypertonic, and tender upon light palpation.   - Reviewed her most recent imaging from pelvic US on 3/5/2025 that demonstrated a small uterus measuring 4x4x8 cm in size which is normal. Endometrial thickness is 0.33cm which is normal.   - Ordered CT Abdomen/Pelvis WITHOUT IV contrast as the patient is allergic to iodinated contrast media.   > We will order imaging to assess the uterus size and anatomy due to presence of uterine fibroids in the setting of abdominopelvic pain.   > [facility] her to be added on to our combine case surgery @ [facility] with a plan for her to do TLH/BSO at the time of our POP repair surgery if her pain is related to her uterus.     I did review her images from the pelvic US as well and her CT in 12/2024. There eis no comment on the pelvic structures in the 12/2024 CT report but the uterus does appear enlarged and deviating to the right on the images.     Follow up in 2-4 weeks with Dr. Israel to discuss CT results, UDN results, and to finalize surgical planning.     Scribe Attestation  By signing my name below, I, Jose Maria Hatch, attest that this documentation has been prepared under the direction and in the presence of Susana Israel MD on 06/02/2025 at 3:47 PM.     Agree with above. I Dr. Israel, personally performed the services described in the documentation which was scribed virtually and confirm it is both complete and  accurate.  Susana Israel MD        Problem List Items Addressed This Visit    None  Visit Diagnoses         Uterovaginal prolapse    -  Primary    Relevant Orders    Measure post void residual (Completed)    POCT UA Automated manually resulted (Completed)    Uroflowmetry      Prolapse of uterus          Abdominal pain, unspecified abdominal location        Relevant Orders    CT abdomen pelvis wo IV contrast      Urge incontinence of urine        Relevant Orders    Uroflowmetry                I spent a total of eConsult Time: 40 minutes in face to face and non face to face time.        Susana Israel MD        HISTORY OF PRESENT ILLNESS:   75 year old female presenting in follow up for uterine prolapse and UUI.     Records Review:   - Last saw Cb 3/2025 as new pt for uterine POP     Aa: Ba: C: +3     gH: pB: TVL:      Ap: Bp: D:   Interested in proceeding with surgery  Was admitted for pneumonia in 2024    Prolapse Symptoms:  - She feels a vaginal bulge.   - Patient endorses right sided abdominopelvic pain and believes it is related to her POP. She has a hx of uterine fibroids.   - Previously had a laparoscopic appendectomy in 2024.      Urinary Symptoms:   - She reports sensation of incomplete bladder emptying.   - No RADHA with coughing, laughing, and sneezing.   - Patient denies any UUI episodes.   - Endorses abdominal pain.   - Recently completed antibiotics for an acute UTI.   - She reports having small volume voids.     OBGYN History:   - , x4   - Postmenopausal       Past Medical History:    Medical History[1]       Past Surgical History:     Surgical History[2]      Medications:     Prior to Admission medications    Medication Sig Start Date End Date Taking? Authorizing Provider   albuterol (Ventolin HFA) 90 mcg/actuation inhaler Inhale 2 puffs every 4 hours if needed for wheezing or shortness of breath. 25  Pallavi Sharma, MD   amLODIPine (Norvasc) 5 mg tablet Take 1  tablet (5 mg) by mouth once daily.    Historical Provider, MD   estrogens, conjugated, (Premarin) vaginal cream Insert 0.5 g into the vagina 2 times a week. 3/3/25 3/3/26  ELIEZER Rodriguez-ROSHAN   hydroCHLOROthiazide (HYDRODiuril) 25 mg tablet Take 1 tablet (25 mg) by mouth once daily.    Historical Provider, MD   ibuprofen 800 mg tablet Take 1 tablet (800 mg) by mouth every 8 hours if needed for mild pain (1 - 3).    Historical Provider, MD   lisinopril 20 mg tablet Take 1 tablet (20 mg) by mouth once daily.    Historical Provider, MD   MULTIVITAMIN ORAL Take 1 tablet by mouth once daily.    Historical Provider, MD   nicotine polacrilex (Commit) 2 mg lozenge Weeks 1-6: 1 lozenge every 1-2 hrs (max: 5 every 6 hours; 20/day). Weeks 7-9: 1 melquiades every 2-4 hrs (max: 5 every 6 hours; 20/day). Weeks 10-12: 1 every 4-8 hrs (max: 5 every 6 hours; 20/day). 1/2/25   Pallavi Sharma, MD   nicotine polacrilex (Nicorette) 4 mg gum Chew 1 each (4 mg) if needed for smoking cessation. Weeks 1 to 6: Chew 1 piece of gum every 1 to 2 hours (maximum: 24 pieces/day); to increase chances of quitting, chew at least 9 pieces/day during the first 6 weeks. Weeks 7 to 9: Chew 1 piece of gum every 2 to 4 hours (maximum: 24 pieces/day). Weeks 10 to 12: Chew 1 piece of gum every 4 to 8 hours (maximum: 24 pieces/day). 1/2/25 2/1/25  Pallavi Sharma, MD   tiotropium (Spiriva) 18 mcg inhalation capsule Place 1 capsule (18 mcg) into inhaler and inhale once daily. 1/2/25 7/1/25  Pallavi Sharma, MD        ROS  Review of Systems   Constitutional: Negative.    HENT: Negative.     Eyes: Negative.    Respiratory: Negative.     Cardiovascular: Negative.    Gastrointestinal:  Positive for abdominal pain.   Endocrine: Negative.    Genitourinary:  Positive for pelvic pain.   Musculoskeletal: Negative.    Neurological: Negative.    Psychiatric/Behavioral: Negative.          Blood, Urine   Date Value Ref Range Status   12/11/2024 0.06 (1+) (A) NEGATIVE Final  "    POC Blood, Urine   Date Value Ref Range Status   03/26/2025 NEGATIVE NEGATIVE Final     Poc Nitrite, Urine   Date Value Ref Range Status   03/26/2025 POSITIVE (A) NEGATIVE Final     Urobilinogen, Urine   Date Value Ref Range Status   12/11/2024 Normal Normal mg/dL Final     POC Urobilinogen, Urine   Date Value Ref Range Status   03/26/2025 0.2 0.2, 1.0 EU/DL Final     POC Leukocytes, Urine   Date Value Ref Range Status   03/26/2025 TRACE (A) NEGATIVE Final         PHYSICAL EXAM:    /79 (BP Location: Left arm, Patient Position: Sitting)   Pulse 76   Ht 1.626 m (5' 4\")   Wt 67.8 kg (149 lb 6.4 oz)   LMP  (LMP Unknown)   BMI 25.64 kg/m²   No LMP recorded (lmp unknown). Patient is postmenopausal.      Declines chaperone for physical exam.      Well developed, well nourished, in no apparent distress.   Neurologic/Psychiatric:  Awake, Alert and Oriented times 3.  Affect normal.   Abdomen: RUQ and RLQ tenderness present.     GENITAL/URINARY:     External Genitalia:  The patient has normal appearing external genitalia, normal skenes and bartholins glands, and a normal hair distribution.  Her vulva is without lesions, erythema or discharge.  It is non-tender with appropriate sensation.     Urethral Meatus:  Size normal, Location normal, Lesions absent, Prolapse absent.    Urethra:  Fullness absent, Masses absent.    Bladder:  Fullness absent, Masses absent, Tenderness absent.    Vagina:  General appearance normal, Estrogen effect normal, Discharge absent, Lesions absent.     Cervix: Normal, no discharge.   Uterus:  normal size, mobile, and tender, discomfort with palpation the right of the cervix and with motion of the uterus. There is a large fibroid palpable. Posterior and right lateral or this may be the uterine body   Adnexa: normal, no masses or tenderness over the bilateral adnexa     Anus/Perineum:  Lesions absent and masses absent. Normal appearing anus and perineum.     Stress urinary incontinence not " demonstrable.         POP-Q:  Stage: 2  Position: standing straining     Aa: -1       Ba:  C: +4   Gh:  Pb:  TVL: 10         Ap: -2 Bp:  D: 0             Pelvic pain: The pelvic floor muscles are diffusely short, hypertonic, and tender.       Data and DIAGNOSTIC STUDIES REVIEWED   Imaging  No results found.    Cardiology, Vascular, and Other Imaging  No other imaging results found for the past 7 days     Lab Results   Component Value Date    URINECULTURE SEE NOTE (A) 03/26/2025      Lab Results   Component Value Date    GLUCOSE 81 12/19/2024    CALCIUM 9.0 12/19/2024     12/19/2024    K 3.6 12/19/2024    CO2 23 12/19/2024     12/19/2024    BUN 6 12/19/2024    CREATININE 0.68 12/19/2024     Lab Results   Component Value Date    WBC 8.9 12/19/2024    HGB 7.7 (L) 12/19/2024    HCT 25.0 (L) 12/19/2024    MCV 74 (L) 12/19/2024     12/19/2024          Susana Israel MD           [1]  Past Medical History:  Diagnosis Date   • Breast cancer (Multi)    • Current smoker 02/26/2024   [2]  Past Surgical History:  Procedure Laterality Date   • MASTECTOMY     • OTHER SURGICAL HISTORY  03/24/2022    Right mastectomy

## 2025-06-02 NOTE — PROGRESS NOTES
Urogynecology  Provider:  Susana Israel MD  [phone]    ASSESSMENT AND PLAN:   75 year old female with MPP, abdominal pain, and stage 3 uterovaginal prolapse. Comorbidities include: COPD, HTN, personal hx of R breast cancer s/p mastectomy, hx of laparoscopic appendectomy on 10/4/2024, and personal hx of smoking >20 pack years.   Accompanied by her daughter today.    Diagnoses:  #1 Uterovaginal prolapse, stage 3  #2 Abdominal pain  #3 Myofascial pelvic pain   #4 Uterine fibroids     Plan:  1. Uterovaginal prolapse, stage 3  - PVR = 120mL by bladder U/S.  - Plan to order imaging to assess her abdominal pain prior to counseling about POP surgical repair options.   - Reassured that POP does not cause abdominopelvic pain like she is currently experiencing.   - Recommended UDS to evaluate for any presence of occult RADHA to determine any need for an anti-incontinence procedure (I.e. TVT or Bulkamid) at the time of her POP repair surgery.   - We recommended BSO at the time of her TLH to reduce the risk of developing ovarian cancer in the future.   > Patient may need pulmonology clearance due to having a pulmonary nodule in the setting of her having COPD emphysema. Of note, the patient is planning to see new pulmonologist on 6/6/2025.  - Combined case surgical OR request sent for Dr. Miranda to complete TLH and BSO with Dr. Israel to complete USLS vs sacrocolpopexy (patient discussion on these options after CT scan results), APR, perineorrhaphy, +/- TVT (based on UDN testing and patient preference), and cystoscopy @ [facility].     2. Abdominal pain, MPP, hx of uterine fibroids, hx of lx appendectomy on 10/4/2024  - Upon exam she had severe tenderness with palpation to the right of the ectocervix. There is a large fibroid present. Tenderness with motion of the uterus and cervix. The PF muscles are diffusely short, hypertonic, and tender upon light palpation.   - Reviewed her most recent imaging from pelvic US  on 3/5/2025 that demonstrated a small uterus measuring 4x4x8 cm in size which is normal. Endometrial thickness is 0.33cm which is normal.   - Ordered CT Abdomen/Pelvis WITHOUT IV contrast as the patient is allergic to iodinated contrast media.   > We will order imaging to assess the uterus size and anatomy due to presence of uterine fibroids in the setting of abdominopelvic pain.   > Plan to reach out to Dr. Miranda in [facility] for her to be added on to our combine case surgery @ [facility] with a plan for her to do TLH/BSO at the time of our POP repair surgery if her pain is related to her uterus.     I did review her images from the pelvic US as well and her CT in 12/2024. There eis no comment on the pelvic structures in the 12/2024 CT report but the uterus does appear enlarged and deviating to the right on the images.     Follow up in 2-4 weeks with Dr. Israel to discuss CT results, UDN results, and to finalize surgical planning.     Scribe Attestation  By signing my name below, I, Kaz Polo, Scribe, attest that this documentation has been prepared under the direction and in the presence of Susana Israel MD on 06/02/2025 at 3:47 PM.     Agree with above. I Dr. Israel, personally performed the services described in the documentation which was scribed virtually and confirm it is both complete and accurate.  Susana Israel MD        Problem List Items Addressed This Visit    None  Visit Diagnoses         Uterovaginal prolapse    -  Primary    Relevant Orders    Measure post void residual (Completed)    POCT UA Automated manually resulted (Completed)    Uroflowmetry      Prolapse of uterus          Abdominal pain, unspecified abdominal location        Relevant Orders    CT abdomen pelvis wo IV contrast      Urge incontinence of urine        Relevant Orders    Uroflowmetry                I spent a total of eConsult Time: 40 minutes in face to face and non face to face time.        Susana Israel  MD        HISTORY OF PRESENT ILLNESS:   75 year old female presenting in follow up for uterine prolapse and UUI.     Records Review:   - Last saw Cb 3/2025 as new pt for uterine POP     Aa: Ba: C: +3     gH: pB: TVL:      Ap: Bp: D:   Interested in proceeding with surgery  Was admitted for pneumonia in 2024    Prolapse Symptoms:  - She feels a vaginal bulge.   - Patient endorses right sided abdominopelvic pain and believes it is related to her POP. She has a hx of uterine fibroids.   - Previously had a laparoscopic appendectomy in 2024.      Urinary Symptoms:   - She reports sensation of incomplete bladder emptying.   - No RADHA with coughing, laughing, and sneezing.   - Patient denies any UUI episodes.   - Endorses abdominal pain.   - Recently completed antibiotics for an acute UTI.   - She reports having small volume voids.     OBGYN History:   - , x4   - Postmenopausal       Past Medical History:    Medical History[1]       Past Surgical History:     Surgical History[2]      Medications:     Prior to Admission medications    Medication Sig Start Date End Date Taking? Authorizing Provider   albuterol (Ventolin HFA) 90 mcg/actuation inhaler Inhale 2 puffs every 4 hours if needed for wheezing or shortness of breath. 25  Pallavi Sharma, MD   amLODIPine (Norvasc) 5 mg tablet Take 1 tablet (5 mg) by mouth once daily.    Historical Provider, MD   estrogens, conjugated, (Premarin) vaginal cream Insert 0.5 g into the vagina 2 times a week. 3/3/25 3/3/26  ELIEZER Rodriguez-ROSHAN   hydroCHLOROthiazide (HYDRODiuril) 25 mg tablet Take 1 tablet (25 mg) by mouth once daily.    Historical Provider, MD   ibuprofen 800 mg tablet Take 1 tablet (800 mg) by mouth every 8 hours if needed for mild pain (1 - 3).    Historical Provider, MD   lisinopril 20 mg tablet Take 1 tablet (20 mg) by mouth once daily.    Historical Provider, MD   MULTIVITAMIN ORAL Take 1 tablet by mouth once daily.    Historical  "MD Nitin   nicotine polacrilex (Commit) 2 mg lozenge Weeks 1-6: 1 lozenge every 1-2 hrs (max: 5 every 6 hours; 20/day). Weeks 7-9: 1 melquiades every 2-4 hrs (max: 5 every 6 hours; 20/day). Weeks 10-12: 1 every 4-8 hrs (max: 5 every 6 hours; 20/day). 1/2/25   Pallavi Sharma, MD   nicotine polacrilex (Nicorette) 4 mg gum Chew 1 each (4 mg) if needed for smoking cessation. Weeks 1 to 6: Chew 1 piece of gum every 1 to 2 hours (maximum: 24 pieces/day); to increase chances of quitting, chew at least 9 pieces/day during the first 6 weeks. Weeks 7 to 9: Chew 1 piece of gum every 2 to 4 hours (maximum: 24 pieces/day). Weeks 10 to 12: Chew 1 piece of gum every 4 to 8 hours (maximum: 24 pieces/day). 1/2/25 2/1/25  Pallavi Sharma, MD   tiotropium (Spiriva) 18 mcg inhalation capsule Place 1 capsule (18 mcg) into inhaler and inhale once daily. 1/2/25 7/1/25  Pallavi Sharma, MD        ROS  Review of Systems   Constitutional: Negative.    HENT: Negative.     Eyes: Negative.    Respiratory: Negative.     Cardiovascular: Negative.    Gastrointestinal:  Positive for abdominal pain.   Endocrine: Negative.    Genitourinary:  Positive for pelvic pain.   Musculoskeletal: Negative.    Neurological: Negative.    Psychiatric/Behavioral: Negative.          Blood, Urine   Date Value Ref Range Status   12/11/2024 0.06 (1+) (A) NEGATIVE Final     POC Blood, Urine   Date Value Ref Range Status   03/26/2025 NEGATIVE NEGATIVE Final     Poc Nitrite, Urine   Date Value Ref Range Status   03/26/2025 POSITIVE (A) NEGATIVE Final     Urobilinogen, Urine   Date Value Ref Range Status   12/11/2024 Normal Normal mg/dL Final     POC Urobilinogen, Urine   Date Value Ref Range Status   03/26/2025 0.2 0.2, 1.0 EU/DL Final     POC Leukocytes, Urine   Date Value Ref Range Status   03/26/2025 TRACE (A) NEGATIVE Final         PHYSICAL EXAM:    /79 (BP Location: Left arm, Patient Position: Sitting)   Pulse 76   Ht 1.626 m (5' 4\")   Wt 67.8 kg (149 lb 6.4 oz)  "  LMP  (LMP Unknown)   BMI 25.64 kg/m²   No LMP recorded (lmp unknown). Patient is postmenopausal.      Declines chaperone for physical exam.      Well developed, well nourished, in no apparent distress.   Neurologic/Psychiatric:  Awake, Alert and Oriented times 3.  Affect normal.   Abdomen: RUQ and RLQ tenderness present.     GENITAL/URINARY:     External Genitalia:  The patient has normal appearing external genitalia, normal skenes and bartholins glands, and a normal hair distribution.  Her vulva is without lesions, erythema or discharge.  It is non-tender with appropriate sensation.     Urethral Meatus:  Size normal, Location normal, Lesions absent, Prolapse absent.    Urethra:  Fullness absent, Masses absent.    Bladder:  Fullness absent, Masses absent, Tenderness absent.    Vagina:  General appearance normal, Estrogen effect normal, Discharge absent, Lesions absent.     Cervix: Normal, no discharge.   Uterus:  normal size, mobile, and tender, discomfort with palpation the right of the cervix and with motion of the uterus. There is a large fibroid palpable. Posterior and right lateral or this may be the uterine body   Adnexa: normal, no masses or tenderness over the bilateral adnexa     Anus/Perineum:  Lesions absent and masses absent. Normal appearing anus and perineum.     Stress urinary incontinence not demonstrable.         POP-Q:  Stage: 2  Position: standing straining     Aa: -1       Ba:  C: +4   Gh:  Pb:  TVL: 10         Ap: -2 Bp:  D: 0             Pelvic pain: The pelvic floor muscles are diffusely short, hypertonic, and tender.       Data and DIAGNOSTIC STUDIES REVIEWED   Imaging  No results found.    Cardiology, Vascular, and Other Imaging  No other imaging results found for the past 7 days     Lab Results   Component Value Date    URINECULTURE SEE NOTE (A) 03/26/2025      Lab Results   Component Value Date    GLUCOSE 81 12/19/2024    CALCIUM 9.0 12/19/2024     12/19/2024    K 3.6 12/19/2024     CO2 23 12/19/2024     12/19/2024    BUN 6 12/19/2024    CREATININE 0.68 12/19/2024     Lab Results   Component Value Date    WBC 8.9 12/19/2024    HGB 7.7 (L) 12/19/2024    HCT 25.0 (L) 12/19/2024    MCV 74 (L) 12/19/2024     12/19/2024          Susana Israel MD         [1]   Past Medical History:  Diagnosis Date    Breast cancer (Multi)     Current smoker 02/26/2024   [2]   Past Surgical History:  Procedure Laterality Date    MASTECTOMY      OTHER SURGICAL HISTORY  03/24/2022    Right mastectomy

## 2025-06-02 NOTE — LETTER
Marichuy 3, 2025     Maru Sales, ELIEZER-CNP  [address] [facility], [address]  [facility] [address]    Patient: Megan Holt   YOB: 1950   Date of Visit: 6/2/2025       Dear Dr. Maru Sales, APRN-CNP:    Thank you for referring Megan Holt to me for evaluation. Below are my notes for this consultation.  If you have questions, please do not hesitate to call me. I look forward to following your patient along with you.       Sincerely,     Susana Israel MD      CC: No Recipients  ______________________________________________________________________________________    Urogynecology  Provider:  Susana Israel MD  [phone]    ASSESSMENT AND PLAN:   75 year old female with MPP, abdominal pain, and stage 3 uterovaginal prolapse. Comorbidities include: COPD, HTN, personal hx of R breast cancer s/p mastectomy, hx of laparoscopic appendectomy on 10/4/2024, and personal hx of smoking >20 pack years.   Accompanied by her daughter today.    Diagnoses:  #1 Uterovaginal prolapse, stage 3  #2 Abdominal pain  #3 Myofascial pelvic pain   #4 Uterine fibroids     Plan:  1. Uterovaginal prolapse, stage 3  - PVR = 120mL by bladder U/S.  - Plan to order imaging to assess her abdominal pain prior to counseling about POP surgical repair options.   - Reassured that POP does not cause abdominopelvic pain like she is currently experiencing.   - Recommended UDS to evaluate for any presence of occult RADHA to determine any need for an anti-incontinence procedure (I.e. TVT or Bulkamid) at the time of her POP repair surgery.   - We recommended BSO at the time of her TLH to reduce the risk of developing ovarian cancer in the future.   > Patient may need pulmonology clearance due to having a pulmonary nodule in the setting of her having COPD emphysema. Of note, the patient is planning to see new pulmonologist on 6/6/2025.  - Combined case surgical OR request sent for Dr. Miranda to  complete TLH and BSO with Dr. Israel to complete USLS vs sacrocolpopexy (patient discussion on these options after CT scan results), APR, perineorrhaphy, +/- TVT (based on UDN testing and patient preference), and cystoscopy @ [facility].     2. Abdominal pain, MPP, hx of uterine fibroids, hx of lx appendectomy on 10/4/2024  - Upon exam she had severe tenderness with palpation to the right of the ectocervix. There is a large fibroid present. Tenderness with motion of the uterus and cervix. The PF muscles are diffusely short, hypertonic, and tender upon light palpation.   - Reviewed her most recent imaging from pelvic US on 3/5/2025 that demonstrated a small uterus measuring 4x4x8 cm in size which is normal. Endometrial thickness is 0.33cm which is normal.   - Ordered CT Abdomen/Pelvis WITHOUT IV contrast as the patient is allergic to iodinated contrast media.   > We will order imaging to assess the uterus size and anatomy due to presence of uterine fibroids in the setting of abdominopelvic pain.   > Plan to [facility] her to be added on to our combine case surgery @ [facility] with a plan for her to do TLH/BSO at the time of our POP repair surgery if her pain is related to her uterus.     I did review her images from the pelvic US as well and her CT in 12/2024. There eis no comment on the pelvic structures in the 12/2024 CT report but the uterus does appear enlarged and deviating to the right on the images.     Follow up in 2-4 weeks with Dr. Israel to discuss CT results, UDN results, and to finalize surgical planning.     Scribe Attestation  By signing my name below, I, Jose Maria Hatch, attest that this documentation has been prepared under the direction and in the presence of Susana Israel MD on 06/02/2025 at 3:47 PM.     Agree with above. I Dr. Israel, personally performed the services described in the documentation which was scribed virtually and confirm it is both  complete and accurate.  Susana Israel MD        Problem List Items Addressed This Visit    None  Visit Diagnoses         Uterovaginal prolapse    -  Primary    Relevant Orders    Measure post void residual (Completed)    POCT UA Automated manually resulted (Completed)    Uroflowmetry      Prolapse of uterus          Abdominal pain, unspecified abdominal location        Relevant Orders    CT abdomen pelvis wo IV contrast      Urge incontinence of urine        Relevant Orders    Uroflowmetry                I spent a total of eConsult Time: 40 minutes in face to face and non face to face time.        Susana Israel MD        HISTORY OF PRESENT ILLNESS:   75 year old female presenting in follow up for uterine prolapse and UUI.     Records Review:   - Last saw Cb 3/2025 as new pt for uterine POP     Aa: Ba: C: +3     gH: pB: TVL:      Ap: Bp: D:   Interested in proceeding with surgery  Was admitted for pneumonia in 2024    Prolapse Symptoms:  - She feels a vaginal bulge.   - Patient endorses right sided abdominopelvic pain and believes it is related to her POP. She has a hx of uterine fibroids.   - Previously had a laparoscopic appendectomy in 2024.      Urinary Symptoms:   - She reports sensation of incomplete bladder emptying.   - No RADHA with coughing, laughing, and sneezing.   - Patient denies any UUI episodes.   - Endorses abdominal pain.   - Recently completed antibiotics for an acute UTI.   - She reports having small volume voids.     OBGYN History:   - , x4   - Postmenopausal       Past Medical History:    Medical History[1]       Past Surgical History:     Surgical History[2]      Medications:     Prior to Admission medications    Medication Sig Start Date End Date Taking? Authorizing Provider   albuterol (Ventolin HFA) 90 mcg/actuation inhaler Inhale 2 puffs every 4 hours if needed for wheezing or shortness of breath. 25  Pallavi Sharma, MD   amLODIPine (Norvasc) 5 mg  tablet Take 1 tablet (5 mg) by mouth once daily.    Historical Provider, MD   estrogens, conjugated, (Premarin) vaginal cream Insert 0.5 g into the vagina 2 times a week. 3/3/25 3/3/26  ELIEZER Rodriguez-CNP   hydroCHLOROthiazide (HYDRODiuril) 25 mg tablet Take 1 tablet (25 mg) by mouth once daily.    Historical Provider, MD   ibuprofen 800 mg tablet Take 1 tablet (800 mg) by mouth every 8 hours if needed for mild pain (1 - 3).    Historical Provider, MD   lisinopril 20 mg tablet Take 1 tablet (20 mg) by mouth once daily.    Historical Provider, MD   MULTIVITAMIN ORAL Take 1 tablet by mouth once daily.    Historical Provider, MD   nicotine polacrilex (Commit) 2 mg lozenge Weeks 1-6: 1 lozenge every 1-2 hrs (max: 5 every 6 hours; 20/day). Weeks 7-9: 1 melquiades every 2-4 hrs (max: 5 every 6 hours; 20/day). Weeks 10-12: 1 every 4-8 hrs (max: 5 every 6 hours; 20/day). 1/2/25   Pallavi Sharma, MD   nicotine polacrilex (Nicorette) 4 mg gum Chew 1 each (4 mg) if needed for smoking cessation. Weeks 1 to 6: Chew 1 piece of gum every 1 to 2 hours (maximum: 24 pieces/day); to increase chances of quitting, chew at least 9 pieces/day during the first 6 weeks. Weeks 7 to 9: Chew 1 piece of gum every 2 to 4 hours (maximum: 24 pieces/day). Weeks 10 to 12: Chew 1 piece of gum every 4 to 8 hours (maximum: 24 pieces/day). 1/2/25 2/1/25  Pallavi Sharma, MD   tiotropium (Spiriva) 18 mcg inhalation capsule Place 1 capsule (18 mcg) into inhaler and inhale once daily. 1/2/25 7/1/25  Pallavi Sharma, MD        ROS  Review of Systems   Constitutional: Negative.    HENT: Negative.     Eyes: Negative.    Respiratory: Negative.     Cardiovascular: Negative.    Gastrointestinal:  Positive for abdominal pain.   Endocrine: Negative.    Genitourinary:  Positive for pelvic pain.   Musculoskeletal: Negative.    Neurological: Negative.    Psychiatric/Behavioral: Negative.          Blood, Urine   Date Value Ref Range Status   12/11/2024 0.06 (1+) (A)  "NEGATIVE Final     POC Blood, Urine   Date Value Ref Range Status   03/26/2025 NEGATIVE NEGATIVE Final     Poc Nitrite, Urine   Date Value Ref Range Status   03/26/2025 POSITIVE (A) NEGATIVE Final     Urobilinogen, Urine   Date Value Ref Range Status   12/11/2024 Normal Normal mg/dL Final     POC Urobilinogen, Urine   Date Value Ref Range Status   03/26/2025 0.2 0.2, 1.0 EU/DL Final     POC Leukocytes, Urine   Date Value Ref Range Status   03/26/2025 TRACE (A) NEGATIVE Final         PHYSICAL EXAM:    /79 (BP Location: Left arm, Patient Position: Sitting)   Pulse 76   Ht 1.626 m (5' 4\")   Wt 67.8 kg (149 lb 6.4 oz)   LMP  (LMP Unknown)   BMI 25.64 kg/m²   No LMP recorded (lmp unknown). Patient is postmenopausal.      Declines chaperone for physical exam.      Well developed, well nourished, in no apparent distress.   Neurologic/Psychiatric:  Awake, Alert and Oriented times 3.  Affect normal.   Abdomen: RUQ and RLQ tenderness present.     GENITAL/URINARY:     External Genitalia:  The patient has normal appearing external genitalia, normal skenes and bartholins glands, and a normal hair distribution.  Her vulva is without lesions, erythema or discharge.  It is non-tender with appropriate sensation.     Urethral Meatus:  Size normal, Location normal, Lesions absent, Prolapse absent.    Urethra:  Fullness absent, Masses absent.    Bladder:  Fullness absent, Masses absent, Tenderness absent.    Vagina:  General appearance normal, Estrogen effect normal, Discharge absent, Lesions absent.     Cervix: Normal, no discharge.   Uterus:  normal size, mobile, and tender, discomfort with palpation the right of the cervix and with motion of the uterus. There is a large fibroid palpable. Posterior and right lateral or this may be the uterine body   Adnexa: normal, no masses or tenderness over the bilateral adnexa     Anus/Perineum:  Lesions absent and masses absent. Normal appearing anus and perineum.     Stress urinary " incontinence not demonstrable.         POP-Q:  Stage: 2  Position: standing straining     Aa: -1       Ba:  C: +4   Gh:  Pb:  TVL: 10         Ap: -2 Bp:  D: 0             Pelvic pain: The pelvic floor muscles are diffusely short, hypertonic, and tender.       Data and DIAGNOSTIC STUDIES REVIEWED   Imaging  No results found.    Cardiology, Vascular, and Other Imaging  No other imaging results found for the past 7 days     Lab Results   Component Value Date    URINECULTURE SEE NOTE (A) 03/26/2025      Lab Results   Component Value Date    GLUCOSE 81 12/19/2024    CALCIUM 9.0 12/19/2024     12/19/2024    K 3.6 12/19/2024    CO2 23 12/19/2024     12/19/2024    BUN 6 12/19/2024    CREATININE 0.68 12/19/2024     Lab Results   Component Value Date    WBC 8.9 12/19/2024    HGB 7.7 (L) 12/19/2024    HCT 25.0 (L) 12/19/2024    MCV 74 (L) 12/19/2024     12/19/2024          Susana Israel MD           [1]  Past Medical History:  Diagnosis Date   • Breast cancer (Multi)    • Current smoker 02/26/2024   [2]  Past Surgical History:  Procedure Laterality Date   • MASTECTOMY     • OTHER SURGICAL HISTORY  03/24/2022    Right mastectomy

## 2025-06-05 ENCOUNTER — HOSPITAL ENCOUNTER (OUTPATIENT)
Facility: HOSPITAL | Age: 75
Setting detail: OUTPATIENT SURGERY
End: 2025-06-05
Attending: OBSTETRICS & GYNECOLOGY | Admitting: OBSTETRICS & GYNECOLOGY
Payer: MEDICARE

## 2025-06-05 ENCOUNTER — TELEPHONE (OUTPATIENT)
Dept: OBSTETRICS AND GYNECOLOGY | Facility: CLINIC | Age: 75
End: 2025-06-05
Payer: MEDICARE

## 2025-06-05 ENCOUNTER — OFFICE VISIT (OUTPATIENT)
Dept: PULMONOLOGY | Facility: HOSPITAL | Age: 75
End: 2025-06-05
Payer: MEDICARE

## 2025-06-05 ENCOUNTER — PHARMACY VISIT (OUTPATIENT)
Dept: PHARMACY | Facility: CLINIC | Age: 75
End: 2025-06-05
Payer: MEDICARE

## 2025-06-05 VITALS
HEART RATE: 116 BPM | TEMPERATURE: 97.2 F | OXYGEN SATURATION: 96 % | SYSTOLIC BLOOD PRESSURE: 107 MMHG | BODY MASS INDEX: 25.58 KG/M2 | DIASTOLIC BLOOD PRESSURE: 69 MMHG | WEIGHT: 149 LBS

## 2025-06-05 DIAGNOSIS — J43.9 PULMONARY EMPHYSEMA, UNSPECIFIED EMPHYSEMA TYPE (MULTI): ICD-10-CM

## 2025-06-05 DIAGNOSIS — R91.1 LUNG NODULE SEEN ON IMAGING STUDY: Primary | ICD-10-CM

## 2025-06-05 DIAGNOSIS — F17.200 CURRENT SMOKER: ICD-10-CM

## 2025-06-05 DIAGNOSIS — J44.9 CHRONIC OBSTRUCTIVE PULMONARY DISEASE, UNSPECIFIED COPD TYPE (MULTI): ICD-10-CM

## 2025-06-05 PROBLEM — N81.4 CYSTOCELE WITH UTERINE PROLAPSE: Status: ACTIVE | Noted: 2025-06-02

## 2025-06-05 PROBLEM — D21.9 FIBROIDS: Status: ACTIVE | Noted: 2025-06-02

## 2025-06-05 PROCEDURE — 4004F PT TOBACCO SCREEN RCVD TLK: CPT | Performed by: NURSE PRACTITIONER

## 2025-06-05 PROCEDURE — 99213 OFFICE O/P EST LOW 20 MIN: CPT | Performed by: NURSE PRACTITIONER

## 2025-06-05 PROCEDURE — 99212 OFFICE O/P EST SF 10 MIN: CPT | Performed by: NURSE PRACTITIONER

## 2025-06-05 PROCEDURE — 1126F AMNT PAIN NOTED NONE PRSNT: CPT | Performed by: NURSE PRACTITIONER

## 2025-06-05 PROCEDURE — 1159F MED LIST DOCD IN RCRD: CPT | Performed by: NURSE PRACTITIONER

## 2025-06-05 PROCEDURE — RXMED WILLOW AMBULATORY MEDICATION CHARGE

## 2025-06-05 RX ORDER — ALBUTEROL SULFATE 90 UG/1
2 INHALANT RESPIRATORY (INHALATION) EVERY 4 HOURS PRN
Qty: 8.5 G | Refills: 5 | Status: SHIPPED | OUTPATIENT
Start: 2025-06-05 | End: 2025-06-05 | Stop reason: SDUPTHER

## 2025-06-05 RX ORDER — TIOTROPIUM BROMIDE 18 UG/1
1 CAPSULE ORAL; RESPIRATORY (INHALATION)
Qty: 30 CAPSULE | Refills: 5 | Status: SHIPPED | OUTPATIENT
Start: 2025-06-05 | End: 2025-06-05 | Stop reason: SDUPTHER

## 2025-06-05 RX ORDER — TIOTROPIUM BROMIDE 18 UG/1
1 CAPSULE ORAL; RESPIRATORY (INHALATION)
Qty: 30 CAPSULE | Refills: 5 | Status: SHIPPED | OUTPATIENT
Start: 2025-06-05 | End: 2025-12-02

## 2025-06-05 RX ORDER — ALBUTEROL SULFATE 90 UG/1
2 INHALANT RESPIRATORY (INHALATION) EVERY 4 HOURS PRN
Qty: 8.5 G | Refills: 5 | Status: SHIPPED | OUTPATIENT
Start: 2025-06-05 | End: 2026-06-05

## 2025-06-05 ASSESSMENT — ENCOUNTER SYMPTOMS
BACK PAIN: 0
SLEEP DISTURBANCE: 1
DIARRHEA: 0
DIZZINESS: 0
PALPITATIONS: 0
FEVER: 0
SINUS PRESSURE: 0
ARTHRALGIAS: 0
ABDOMINAL PAIN: 0
FATIGUE: 1
JOINT SWELLING: 0
HEADACHES: 0
NERVOUS/ANXIOUS: 0
WEAKNESS: 0
EYE PAIN: 0
NAUSEA: 0
VOMITING: 0
MYALGIAS: 1
VOICE CHANGE: 0
AGITATION: 0
NUMBNESS: 0
RHINORRHEA: 0

## 2025-06-05 ASSESSMENT — PAIN SCALES - GENERAL: PAINLEVEL_OUTOF10: 0-NO PAIN

## 2025-06-05 NOTE — PATIENT INSTRUCTIONS
Moderate restriction / emphysema: possible CPFE. Hx smoking, quit 10/2024. PFT 12/2024 with moderate restriction and emphysema  - continue spiriva daily   - continue albuterol HFA inhaler 2 puffs every 4-6 hours as needed for shortness of breath      2. Lung nodules: repeat CT 12/2024 with stable nodules and 1 new 5mm nodule along with worsening interstitial and ground glass changes - will repeat CT chest in 3 months to assess for resolution.   - call and schedule repeat CT  - call and schedule CT of chest [phone] [CT scheduled is for your abdomen]    3. Smoking cessation counseling: nicotine replacement not covered by insurance - going to try and her call 1800 number   - > 5 minutes smoking cessation counseling  - given  smoking cessation book today in clinic     Thank you for visiting the Pulmonary clinic today!   Return to clinic  6 months after CT chest or sooner if needed   Ro Valdez CNP  My office -  [phone]- Shashi is my . Sherri is my nurse [phone].   Radiology scheduling [phone]   Appointment scheduling [phone]   Pulmonary function testing - [phone]

## 2025-06-05 NOTE — PROGRESS NOTES
Patient: Megan Holt    [id number]  : 1950 -- AGE 75 y.o.    Provider: ELIEZER Hernandez-CNP     Location [facility]   Service Date: 2025              [facility]  Follow up visit note      HISTORY OF PRESENT ILLNESS       HISTORY OF PRESENT ILLNESS     On today's visit, the patient reports she has been using her spiriva daily (when she remembers).  She uses her PRN albuterol if she is out and about - not needing often. She has a slight cough here and there. She has some post nasal drip. She states if she takes a mucinex it will help and she can cough up the mucous. She denies any wheezing, SOB at rest, GERD, CP, or allergies. She might have some more mucous in her throat with the weather changing.  She has PLEITEZ with walking long distances - many times more her legs getting tired.  She stopped smoking - she states her insurance wouldn't cover nicotine gum/ lozenges.  She has not called the "[employer] line for [place].     Dr. Grossman - 1/3/25   -- Megan Holt is a 74 y.o. female with PMH pulmonary nodules current smoker, HTN, R breast ca s/p mastectomy in , presenting to pulmonary clinic for follow up of nodules.  -- She was last seen 6/10/2024 by Dr. Sweeney at which point her PFT 2023 showed normal spirometry and lung volumes with low DLCO. Largest nodule on CT chest done 2022 for lung ca screening showed a 7mm nodule and repeat 2023 was unchanged. Plan was for follow up in 6 months and referral was made to smoking cessation counseling. She was also found to be anemic and colonoscopy was recommended.   -- She was recently admitted to the hospital with worsening anemia and colonoscopy was recommended again. She was found to have E.coli bacteremia and possible UTI. She improved with abx. She was found to have a new 5mm nodule on CT 2024. She was discharged with PCP follow up.  -- For her pulmonary symptoms, she is not currently taking  any inhalers. She was supposed to be discharged with Breo but says she did not have it. She has quit smokin since October 2024 and reports having occasional urges to smoke so is requesting gum/lozenges but may have had some insurance issues when her PCP tried to order these for her. She otherwise reports no respiratory symptoms at this time. She denies any cough/phlegm, SOB, PLEITEZ, chest pressure, or hemoptysis. No fever/chills, B symptoms.     ALLERGIES AND MEDICATIONS     ALLERGIES  Allergies[1]    MEDICATIONS  Current Medications[2]      PAST HISTORY     PAST MEDICAL HISTORY  - pulmonary nodules   - HTN  - R breast ca s/p mastectomy in 1993    PAST SURGICAL HISTORY  Surgical History[3]    IMMUNIZATION HISTORY  Immunization History   Administered Date(s) Administered    Moderna SARS-CoV-2 Vaccination 05/13/2021, 06/10/2021, 02/16/2022       SOCIAL HISTORY  Smoking: 15- Current 4 cigarettes a day (stopped when pregnant) 10 cigarettes a day on average ~25 pack years   Alcohol: social drinking   Illicit drugs:  marijuana on special occasions.     OCCUPATIONAL/ENVIRONMENTAL HISTORY  Retired - works 2 days a week though [employer] - [employer]     FAMILY HISTORY  Sister and nephew - COPD     RESULTS/DATA     Pulmonary Function Test Results     12/23/24 - FEV1/FVC 0.66/ FEV1 1.40 (68%)/ FVC 1.96 (74%)/ TLC 3.68 (72%)/ DLCO 36%     6MWT: 8/17/23 - 317m () 99-95% on RA, THANIA 1     Chest Radiograph     XR chest 1 view 12/11/2024    Impression  1. Increased lung markings bilaterally. Atypical/viral pneumonia is  highly differential. A component of mild edema may be present as well    Chest CT Scan     11/28/22 - Scattered pulmonary nodules measuring up to 7 mm as described  above. Recommend follow-up chest CT in 6 months.  2.  Mild to moderate upper lung predominant emphysema.  3. Heterogeneous enlargement of the thyroid gland with multiple  nodules. Recommend correlation with thyroid ultrasound.  4. Moderate to severe  "coronary artery calcification.  5. Cholelithiasis.  6. Other upper abdominal findings as described above.    6/27/23 - There are no suspicious parenchymal lung nodules. Stable right   lower lobe pleural-based opacity is most likely atelectatic in nature   and continued surveillance with 1 year low-dose chest CT is   recommended.   2. Thyromegaly with heterogeneity of the thyroid gland and   correlation with ultrasound is recommended.   3. Exophytic thyroid cysts.       CT angio chest for pulmonary embolism 12/11/2024  Impression  1. No evidence of acute pulmonary embolism.  2. Diffuse bilateral interlobular septal thickening with asymmetric  prominence towards right. In part, findings may be secondary to low  lung volumes/poor inspiratory effort; however, pulmonary edema and/or  superimposed atypical infection cannot be excluded.  3. Moderate centrilobular predominant emphysema.  4. Interval development of a 5 mm pulmonary nodule within the right  middle lobe. Recommend follow-up CT of the chest in 6 months to  evaluate for stability  5. Other non-acute, chronic findings as described above.        Echocardiogram     12/3/24 - No definite valvular vegetations were visualized.   2. The left ventricular systolic function is normal, with a Copeland's biplane calculated ejection fraction of 66%.   3. Left ventricular diastolic filling is indeterminate with normal left atrial filling pressure.   4. There is anterobasal septal hypertrophy with a sigmoid septum.   5. There is low normal right ventricular systolic function.   6. TAPSE= 17.5 mm.   7. The left atrium is mildly dilated.   8. Mild to moderately elevated right ventricular systolic pressure.       Labs/ Other testing      Eosinophils Absolute (x10*3/uL)   Date Value   12/19/2024 0.07   12/18/2024 0.08   12/17/2024 0.13     Eosinophils Absolute, Manual (x10*3/uL)   Date Value   12/15/2024 0.10     No results found for: \"IGE\"    Respiratory Culture  Lab Results " "  Component Value Date    GRAMSTAIN Gram negative bacilli (AA) 12/13/2024    GRAMSTAIN Gram negative bacilli (AA) 12/11/2024    GRAMSTAIN Gram negative bacilli (AA) 12/11/2024    GRAMSTAIN Gram negative bacilli (AA) 12/11/2024     No results found for the last 90 days.      Fungal Culture  No results found for: \"FUNGALCULTSM\", \"FUNGALSMEAR\"    AFB Culture  No results found for: \"AFBCX\", \"AFBSTAIN\"      REVIEW OF SYSTEMS     REVIEW OF SYSTEMS  Review of Systems   Constitutional:  Positive for fatigue. Negative for fever.   HENT:  Positive for postnasal drip. Negative for congestion, rhinorrhea, sinus pressure and voice change.    Eyes:  Negative for pain and visual disturbance.   Cardiovascular:  Negative for chest pain, palpitations and leg swelling.   Gastrointestinal:  Negative for abdominal pain, diarrhea, nausea and vomiting.   Endocrine: Negative for cold intolerance and heat intolerance.   Musculoskeletal:  Positive for myalgias. Negative for arthralgias, back pain and joint swelling.   Skin:  Negative for rash.   Neurological:  Negative for dizziness, weakness, numbness and headaches.   Psychiatric/Behavioral:  Positive for sleep disturbance. Negative for agitation. The patient is not nervous/anxious.          PHYSICAL EXAM     VITAL SIGNS: LMP  (LMP Unknown)      CURRENT WEIGHT: [unfilled]  BMI: [unfilled]  PREVIOUS WEIGHTS:  Wt Readings from Last 3 Encounters:   06/02/25 67.8 kg (149 lb 6.4 oz)   03/26/25 66.8 kg (147 lb 3.2 oz)   02/28/25 67.1 kg (148 lb)       Physical Exam  Vitals reviewed.   Constitutional:       General: She is not in acute distress.     Appearance: Normal appearance. She is not ill-appearing or toxic-appearing.   HENT:      Head: Normocephalic.      Nose: No rhinorrhea.   Cardiovascular:      Rate and Rhythm: Normal rate and regular rhythm.      Heart sounds: Normal heart sounds.   Pulmonary:      Effort: Pulmonary effort is normal. No respiratory distress.      Breath sounds: Normal " breath sounds. No stridor. No wheezing, rhonchi or rales.   Abdominal:      General: Abdomen is flat.   Musculoskeletal:         General: Normal range of motion.      Right lower leg: No edema.      Left lower leg: No edema.   Skin:     General: Skin is warm and dry.      Nails: There is no clubbing.   Neurological:      General: No focal deficit present.      Mental Status: She is alert and oriented to person, place, and time.   Psychiatric:         Mood and Affect: Mood normal.         Behavior: Behavior normal.         Judgment: Judgment normal.         ASSESSMENT/PLAN     Moderate restriction / emphysema: possible CPFE. Hx smoking, quit 10/2024. PFT 12/2024 with moderate restriction and emphysema  - continue spiriva daily   - continue albuterol HFA inhaler 2 puffs every 4-6 hours as needed for shortness of breath      2. Lung nodules: repeat CT 12/2024 with stable nodules and 1 new 5mm nodule along with worsening interstitial and ground glass changes - will repeat CT chest in 3 months to assess for resolution.   - call and schedule repeat CT  - call and schedule CT of chest [phone] [CT scheduled is for your abdomen]    3. Smoking cessation counseling: nicotine replacement not covered by insurance - going to try and her call 1800 number . ~25 pack years - will qualify for LCS after diagnostic scans.   - > 5 minutes smoking cessation counseling  - given  smoking cessation book today in clinic     Thank you for visiting the Pulmonary clinic today!   Return to clinic  6 months after CT chest or sooner if needed   Ro Valdez CNP  My office -  [phone]- Shashi is my . Sherri is my nurse [phone].   Radiology scheduling [phone]   Appointment scheduling [phone]   Pulmonary function testing - [phone]                      [1]   Allergies  Allergen Reactions    Iodinated Contrast Media Hives   [2]   Current Outpatient Medications   Medication Sig Dispense  Refill    albuterol (Ventolin HFA) 90 mcg/actuation inhaler Inhale 2 puffs every 4 hours if needed for wheezing or shortness of breath. 8.5 g 5    amLODIPine (Norvasc) 5 mg tablet Take 1 tablet (5 mg) by mouth once daily.      estrogens, conjugated, (Premarin) vaginal cream Insert 0.5 g into the vagina 2 times a week. 12 g 3    hydroCHLOROthiazide (HYDRODiuril) 25 mg tablet Take 1 tablet (25 mg) by mouth once daily.      ibuprofen 800 mg tablet Take 1 tablet (800 mg) by mouth every 8 hours if needed for mild pain (1 - 3).      lisinopril 20 mg tablet Take 1 tablet (20 mg) by mouth once daily.      MULTIVITAMIN ORAL Take 1 tablet by mouth once daily.      nicotine polacrilex (Commit) 2 mg lozenge Weeks 1-6: 1 lozenge every 1-2 hrs (max: 5 every 6 hours; 20/day). Weeks 7-9: 1 melquiades every 2-4 hrs (max: 5 every 6 hours; 20/day). Weeks 10-12: 1 every 4-8 hrs (max: 5 every 6 hours; 20/day). 200 lozenge 2    nicotine polacrilex (Nicorette) 4 mg gum Chew 1 each (4 mg) if needed for smoking cessation. Weeks 1 to 6: Chew 1 piece of gum every 1 to 2 hours (maximum: 24 pieces/day); to increase chances of quitting, chew at least 9 pieces/day during the first 6 weeks. Weeks 7 to 9: Chew 1 piece of gum every 2 to 4 hours (maximum: 24 pieces/day). Weeks 10 to 12: Chew 1 piece of gum every 4 to 8 hours (maximum: 24 pieces/day). 100 each 3    tiotropium (Spiriva) 18 mcg inhalation capsule Place 1 capsule (18 mcg) into inhaler and inhale once daily. 30 capsule 5     No current facility-administered medications for this visit.   [3]   Past Surgical History:  Procedure Laterality Date    MASTECTOMY      OTHER SURGICAL HISTORY  03/24/2022    Right mastectomy

## 2025-06-05 NOTE — TELEPHONE ENCOUNTER
Appointment scheduled for consultation for hysterectomy with Dr. Miranda on 7/23/25 at [place] [facility], [address], [place], [place].

## 2025-06-13 PROCEDURE — RXMED WILLOW AMBULATORY MEDICATION CHARGE

## 2025-06-16 ENCOUNTER — PHARMACY VISIT (OUTPATIENT)
Dept: PHARMACY | Facility: CLINIC | Age: 75
End: 2025-06-16
Payer: COMMERCIAL

## 2025-06-17 ENCOUNTER — HOSPITAL ENCOUNTER (OUTPATIENT)
Dept: RADIOLOGY | Facility: HOSPITAL | Age: 75
Discharge: HOME | End: 2025-06-17
Payer: MEDICARE

## 2025-06-17 DIAGNOSIS — J44.9 CHRONIC OBSTRUCTIVE PULMONARY DISEASE, UNSPECIFIED COPD TYPE (MULTI): ICD-10-CM

## 2025-06-17 DIAGNOSIS — R10.9 ABDOMINAL PAIN, UNSPECIFIED ABDOMINAL LOCATION: ICD-10-CM

## 2025-06-17 PROCEDURE — 71250 CT THORAX DX C-: CPT | Performed by: STUDENT IN AN ORGANIZED HEALTH CARE EDUCATION/TRAINING PROGRAM

## 2025-06-17 PROCEDURE — 74176 CT ABD & PELVIS W/O CONTRAST: CPT

## 2025-06-17 PROCEDURE — 71250 CT THORAX DX C-: CPT

## 2025-06-19 ENCOUNTER — APPOINTMENT (OUTPATIENT)
Dept: RADIOLOGY | Facility: HOSPITAL | Age: 75
End: 2025-06-19
Payer: MEDICARE

## 2025-06-24 ENCOUNTER — TELEPHONE (OUTPATIENT)
Dept: OBSTETRICS AND GYNECOLOGY | Facility: CLINIC | Age: 75
End: 2025-06-24
Payer: MEDICARE

## 2025-06-24 ENCOUNTER — PROCEDURE VISIT (OUTPATIENT)
Dept: OBSTETRICS AND GYNECOLOGY | Facility: CLINIC | Age: 75
End: 2025-06-24
Payer: MEDICARE

## 2025-06-24 DIAGNOSIS — N81.4 UTEROVAGINAL PROLAPSE: ICD-10-CM

## 2025-06-24 PROCEDURE — 51784 ANAL/URINARY MUSCLE STUDY: CPT | Performed by: OBSTETRICS & GYNECOLOGY

## 2025-06-24 PROCEDURE — 51741 ELECTRO-UROFLOWMETRY FIRST: CPT | Performed by: OBSTETRICS & GYNECOLOGY

## 2025-06-24 PROCEDURE — 51797 INTRAABDOMINAL PRESSURE TEST: CPT | Performed by: OBSTETRICS & GYNECOLOGY

## 2025-06-24 PROCEDURE — 51729 CYSTOMETROGRAM W/VP&UP: CPT | Performed by: OBSTETRICS & GYNECOLOGY

## 2025-06-24 NOTE — TELEPHONE ENCOUNTER
Left message that CT results found no new findings. Provided office phone number [phone] to call with further questions.

## 2025-06-24 NOTE — TELEPHONE ENCOUNTER
----- Message from Susana Israel sent at 6/20/2025 12:21 PM EDT -----  Please notify pt that CT benign with known calcified fibroids  ----- Message -----  From: Interface, Radiology Results In  Sent: 6/18/2025   5:10 PM EDT  To: Susana Israel MD

## 2025-07-02 NOTE — PROGRESS NOTES
Urogynecology  Provider:  Susana Israel MD  [phone]              ASSESSMENT AND PLAN:   75-year-old female being assessed for stage 3 uterovaginal prolapse, abdominal pain, myofascial pelvic pain, and uterine fibroids.    Diagnoses:  #1 Stage 3 uterovaginal prolapse  #2 Low bone density  #3 Constipation  #4 Pulmonary clearance    Plan:  Stage 3 uterovaginal prolapse  - Plan for laparoscopic colpopexy, APR, and rigid cystoscopy on 8/22/2025 for the joint cause with Dr. Miranda. No sling.  - Reviewed UDN showed -RADHA.  - She will be having a hysterectomy and ovaries removed with Dr. Miranda concurrently with our POP repair.  - She will receive a call on August 21 with instructions on arrival time for surgery on August 22.    2. Low bone density  - She reports low bone density as per a recent bone density test.  - Monitor bone health and consider supplementation with calcium and vitamin D. Follow-up with PCP for further management.    3. Constipation  - CT scan indicates significant stool in the rectum, suggesting constipation.  - Recommended fiber supplementation such as Metamucil, MiraLAX, or Benefiber to improve bowel movements. Advised her to take fiber supplements with water at night.    Follow-up with Dr. Israel 2 weeks post-op.    Scribe Attestation  By signing my name below, IKuldip Scribe, attest that this documentation has been prepared under the direction and in the presence of Susana Israel MD on 07/03/2025 at 7:29 PM.    Agree with above. I Dr. Israel, personally performed the services described in the documentation which was scribed virtually and confirm it is both complete and accurate.  Susana Israel MD      Problem List Items Addressed This Visit    None     Virtual or Telephone Consent    While technically available, the patient was unable or unwilling to consent to connect via audio/video telehealth technology; therefore, I performed this visit using a real-time audio  only connection between Megan Holt & Susana Israel MD.  Verbal consent was requested and obtained from Megan Yanet on this date, 07/03/25 for a telehealth visit and the patient's location was confirmed at the time of the visit.       I spent a total of 12 minutes in face to face and non face to face time at this virtual visit.          Susana Israel MD        HISTORY OF PRESENT ILLNESS:     Last visit 6/2/25 8/22/25 Joint case with Ruben    Laparoscopic Colopopexy [16645 (CPT®)] N/A General   Anterior & Posterior Repairs [13712 (CPT®)] N/A General   Rigid Cystoscopy [80727 (CPT®)] N/A General        Panel 2    Surgeon Role   Rachelle Miranda MD Primary    Procedure Laterality Anesthesia   Laparoscopic Assisted Total Hysterectomy; Bilateral Salpingo Oophorectomy [39030 (CPT®) +1 more]            Diagnoses:  #1 Uterovaginal prolapse, stage 3  #2 Abdominal pain  #3 Myofascial pelvic pain   #4 Uterine fibroids      Plan:  1. Uterovaginal prolapse, stage 3  - PVR = 120mL by bladder U/S.  - Plan to order imaging to assess her abdominal pain prior to counseling about POP surgical repair options.   - Reassured that POP does not cause abdominopelvic pain like she is currently experiencing.   - Recommended UDS to evaluate for any presence of occult RADHA to determine any need for an anti-incontinence procedure (I.e. TVT or Bulkamid) at the time of her POP repair surgery.   - We recommended BSO at the time of her TLH to reduce the risk of developing ovarian cancer in the future.   > Patient may need pulmonology clearance due to having a pulmonary nodule in the setting of her having COPD emphysema. Of note, the patient is planning to see new pulmonologist on 6/6/2025.  - Combined case surgical OR request sent for Dr. Miranda to complete TLH and BSO with Dr. Israel to complete USLS vs sacrocolpopexy (patient discussion on these options after CT scan results), APR, perineorrhaphy, +/- TVT (based on UDN testing and  patient preference), and cystoscopy @ [facility].      2. Abdominal pain, MPP, hx of uterine fibroids, hx of lx appendectomy on 10/4/2024  - Upon exam she had severe tenderness with palpation to the right of the ectocervix. There is a large fibroid present. Tenderness with motion of the uterus and cervix. The PF muscles are diffusely short, hypertonic, and tender upon light palpation.   - Reviewed her most recent imaging from pelvic US on 3/5/2025 that demonstrated a small uterus measuring 4x4x8 cm in size which is normal. Endometrial thickness is 0.33cm which is normal.   - Ordered CT Abdomen/Pelvis WITHOUT IV contrast as the patient is allergic to iodinated contrast media.   > We will order imaging to assess the uterus size and anatomy due to presence of uterine fibroids in the setting of abdominopelvic pain.   > Plan to reach out to Dr. Miranda in [facility] for her to be added on to our combine case surgery @ [facility] with a plan for her to do TLH/BSO at the time of our POP repair surgery if her pain is related to her uterus.      I did review her images from the pelvic US as well and her CT in 12/2024. There eis no comment on the pelvic structures in the 12/2024 CT report but the uterus does appear enlarged and deviating to the right on the images.      Follow up in 2-4 weeks with Dr. Israel to discuss CT results, UDN results, and to finalize surgical planning.      CT Scan: IMPRESSION:  1.  No acute abdominopelvic findings on noncontrast CT imaging.  2. Bulky calcified uterine fibroid. No pelvic masses.  3. Large colonic stool burden. Correlate with concern for  constipation.  4. Additional findings are as described above.    UDN: No RADHA.    PLAN by Jhonatan: USLS/poss A&P rep/perineorr/cysto    Additional History:  - She's undergone UDN showing no leakage with cough/laugh/sneeze.  - She has some surgical planning questions.    Bowel Symptoms:   - She reports being constipated, as indicated by CT showing a  lot of stool in the rectum.  - Doesn't currently take any medication for constipation but moves bowels daily.           Past Medical History:       Medical History[1]       Past Surgical History:       Surgical History[2]      Medications:       Prior to Admission medications    Medication Sig Start Date End Date Taking? Authorizing Provider   albuterol (Ventolin HFA) 90 mcg/actuation inhaler Inhale 2 puffs every 4 hours if needed for wheezing or shortness of breath. 6/5/25 6/5/26  ELIEZER Hernandez-CNP   alendronate (Fosamax) 70 mg tablet take 1 tablet by oral route  every week in the morning, at least 30 min before first food, beverage, or medication of day 6/13/25      amLODIPine (Norvasc) 5 mg tablet Take 1 tablet (5 mg) by mouth once daily.    Historical Provider, MD   calcium carbonate-vitamin D3 (Calcium 600 with Vitamin D3) 600 mg-10 mcg (400 unit) chewable tablet take 1 by oral route with breakfast 6/13/25      estrogens, conjugated, (Premarin) vaginal cream Insert 0.5 g into the vagina 2 times a week. 3/3/25 3/3/26  ELIEZER Rodriguez-ROSHAN   hydroCHLOROthiazide (HYDRODiuril) 25 mg tablet Take 1 tablet (25 mg) by mouth once daily.    Historical Provider, MD   ibuprofen 800 mg tablet Take 1 tablet (800 mg) by mouth every 8 hours if needed for mild pain (1 - 3).    Historical Provider, MD   lisinopril 20 mg tablet Take 1 tablet (20 mg) by mouth once daily.    Historical Provider, MD   multivitamin with minerals iron-free (Centrum Silver) take 1 by oral route each am with breakfast 6/13/25      tiotropium (Spiriva) 18 mcg inhalation capsule Place 1 capsule (18 mcg) into inhaler and inhale once daily. 1/2/25 7/5/25  Pallavi Sharma, MD   tiotropium (Spiriva) 18 mcg inhalation capsule Place 1 capsule (18 mcg) into inhaler and inhale once daily. 6/5/25 12/2/25  LEONIDES Hernandez        ROS  Review of Systems   Constitutional: Negative.    HENT: Negative.     Eyes: Negative.    Respiratory:  Negative.     Cardiovascular: Negative.    Gastrointestinal: Negative.    Endocrine: Negative.    Genitourinary: Negative.    Musculoskeletal: Negative.    Neurological: Negative.    Psychiatric/Behavioral: Negative.            Data and DIAGNOSTIC STUDIES REVIEWED   Imaging  No results found.    Cardiology, Vascular, and Other Imaging  No other imaging results found for the past 7 days     Lab Results   Component Value Date    URINECULTURE SEE NOTE (A) 03/26/2025      Lab Results   Component Value Date    GLUCOSE 81 12/19/2024    CALCIUM 9.0 12/19/2024     12/19/2024    K 3.6 12/19/2024    CO2 23 12/19/2024     12/19/2024    BUN 6 12/19/2024    CREATININE 0.68 12/19/2024     Lab Results   Component Value Date    WBC 8.9 12/19/2024    HGB 7.7 (L) 12/19/2024    HCT 25.0 (L) 12/19/2024    MCV 74 (L) 12/19/2024     12/19/2024                 [1]   Past Medical History:  Diagnosis Date    Breast cancer     Current smoker 02/26/2024   [2]   Past Surgical History:  Procedure Laterality Date    MASTECTOMY      OTHER SURGICAL HISTORY  03/24/2022    Right mastectomy

## 2025-07-02 NOTE — PROGRESS NOTES
Patient ID: Megan Holt is a 75 y.o. female.    Uroflowmetry    Date/Time: 6/24/2025 1:28 PM    Performed by: Remy Bhatti LPN  Authorized by: Susana Israel MD    Procedure discussed: discussed risks, benefits and alternatives    Chaperone present: no    Timeout: timeout called immediately prior to procedure    Position: sitting    Procedure Details     Procedure: CMG with UPP/voiding pressures, EMG, intra-abdominal voiding study and uroflow      CMG with UPP/Voiding Pressures Details:     First urge to void (mL): 88    Urgency to void (mL): 98    Bladder capacity (mL): 182    Intra-abdominal Voiding Study Details:     Rectal catheter placed to record intra-abdominal pressure: yes      Uroflow Details:     Pre-void volume (mL): 0    Voiding duration (sec): 360    Average flow rate (mL/sec): 1    Max flow rate (mL/sec): 3.5    Voided urine (mL): 140    Residual urine (mL): 24    Post-Procedure Details     Outcome: patient tolerated procedure well with no complications      Additional Details      Slowed pump to 40 ml/m at 100 ml infused.  Pt had DO at 130 ml infused and leaked 97 ml.  Slowed pump to 35 ml/m post 1st DO.  2nd DO pt emptied almost completely.  Infused 145 ml more before testing for stress incontinence.  Negative for stress incontinence.

## 2025-07-03 ENCOUNTER — TELEMEDICINE (OUTPATIENT)
Dept: OBSTETRICS AND GYNECOLOGY | Facility: CLINIC | Age: 75
End: 2025-07-03
Payer: MEDICARE

## 2025-07-03 DIAGNOSIS — N39.41 URGE INCONTINENCE OF URINE: Primary | ICD-10-CM

## 2025-07-03 PROCEDURE — 99212 OFFICE O/P EST SF 10 MIN: CPT | Performed by: OBSTETRICS & GYNECOLOGY

## 2025-07-03 ASSESSMENT — ENCOUNTER SYMPTOMS
NEUROLOGICAL NEGATIVE: 1
ENDOCRINE NEGATIVE: 1
CARDIOVASCULAR NEGATIVE: 1
RESPIRATORY NEGATIVE: 1
MUSCULOSKELETAL NEGATIVE: 1
PSYCHIATRIC NEGATIVE: 1
EYES NEGATIVE: 1
CONSTITUTIONAL NEGATIVE: 1
GASTROINTESTINAL NEGATIVE: 1

## 2025-07-23 ENCOUNTER — APPOINTMENT (OUTPATIENT)
Dept: OBSTETRICS AND GYNECOLOGY | Facility: CLINIC | Age: 75
End: 2025-07-23
Payer: MEDICARE

## 2025-07-23 VITALS
BODY MASS INDEX: 24.92 KG/M2 | DIASTOLIC BLOOD PRESSURE: 72 MMHG | WEIGHT: 146 LBS | SYSTOLIC BLOOD PRESSURE: 110 MMHG | HEIGHT: 64 IN

## 2025-07-23 DIAGNOSIS — Z12.31 ENCOUNTER FOR SCREENING MAMMOGRAM FOR MALIGNANT NEOPLASM OF BREAST: Primary | ICD-10-CM

## 2025-07-23 DIAGNOSIS — D64.9 ANEMIA, UNSPECIFIED TYPE: ICD-10-CM

## 2025-07-23 DIAGNOSIS — Z12.11 COLON CANCER SCREENING: ICD-10-CM

## 2025-07-23 DIAGNOSIS — D21.9 FIBROIDS: ICD-10-CM

## 2025-07-23 DIAGNOSIS — N81.10 VAGINAL PROLAPSE: ICD-10-CM

## 2025-07-23 PROCEDURE — 99215 OFFICE O/P EST HI 40 MIN: CPT | Performed by: STUDENT IN AN ORGANIZED HEALTH CARE EDUCATION/TRAINING PROGRAM

## 2025-07-23 PROCEDURE — 1159F MED LIST DOCD IN RCRD: CPT | Performed by: STUDENT IN AN ORGANIZED HEALTH CARE EDUCATION/TRAINING PROGRAM

## 2025-07-23 NOTE — PROGRESS NOTES
"Division of Minimally Invasive Gynecologic Surgery  [facility]    Date: 25 - Gynecology Consult     HISTORY OF PRESENT ILLNESS:  Megan Holt 75 y.o. P4 ( x 4) referred by Dr. Israel for hysterectomy and BSO at time of prolapse repair surgery.     She was seen by Maru Sales 3/2025 for prolapse and urinary urgency/nocturia, as well as difficulty urinating due to prolapse. She was diagnosed w/ Stage 3 prolapse at this visit and expressed interest in surgical repair. She is interested in surgical repair of prolapse and met w/ Dr. Israel to discuss this. She does also struggle w/ lab proven recurrent UTI. She is currently planning for laparoscopic colpopexy, A/P repair with hysterectomy. She is meeting w/ me today to discuss hysterectomy and BSO.     She denies PMB. Not currently sexually active due to her prolapse, though once prolapsed is managed she would like to resume sexual activity.     Of note, she does have significant anemia and has not yet completed colonoscopy. ColoGuard  was positive.     Imaging:   - Pelvic US 3/2025 showed uterus measuring 8cm x 5cm x 5cm. Multiple calcified fibroids. Largest fibroid measures 5cm x 5cm x 5cm. Bilateral ovaries not visualized.   - CT abd/pelvis 2025 showed large stool burden and fibroids, otherwise unremarkable   Labs:   - CMP 2024 unremarkable   - CBC 2024 w/ Hgb 7.7, Plts WNL   - TSH, glucose WNL 2024  Screening:   - Last mammogram: due for mammogram   PMHx: pulmonary nodules/asthma (follows w/ Pulm), current smoker, HTN, R breast cancer (s/p mastectomy + tamoxifen x 5 years, ), CKD, severe anemia, no hx of DVT/PE, denies easy bleeding/bruising   PSHx:  laparoscopic appendectomy 10/2024    PAST MEDICAL HISTORY:  Medical History[1]    PAST SURGICAL HISTORY:  Surgical History[2]    PHYSICAL EXAMINATION:  VITAL SIGNS: /72   Ht 1.626 m (5' 4\")   Wt 66.2 kg (146 lb)   LMP  (LMP Unknown)   BMI 25.06 " kg/m²      Constitutional:  No acute distress, well-nourished and well-developed  HEENT: EOM grossly intact, MMM, neck supple and with full ROM  Pulm:  Effort normal. No accessory muscle usage.  No respiratory distress.  Neurological:  She is alert and oriented to person place and time.  Skin: Warm, no pallor.  Psychiatric:  She has normal mood and affect.    ASSESSMENT:  Megan Holt 75 y.o. P4 ( x 4) referred by Dr. Israel for hysterectomy and BSO at time of prolapse repair surgery.   - We discussed the standard procedure for laparoscopic hysterectomy and BSO. We reviewed the risks/benefits/alternatives to surgery. She is aware of the risk of bleeding, infection, and damage to nearby structures, including bladder, ureters, bowel, and vasculature. She is agreeable to blood transfusion if recommended. We reviewed the small risk of laparotomy and the fact that fertility following hysterectomy is not an option.   - Discussed at length today the importance of repeat blood work to evaluate anemia and the need for colonoscopy/further evaluation to determine source. She did express understanding. We also discussed that I have ordered a mammogram for her, and that she may require referral to Hematology pending lab work.     PLAN:  - Scheduled for [facility]-BSO, colpopexy, A/P repair (Jhonatan) on 25. Will need PAT.   - Repeat CBC, iron studies. Pending lab, consider PO iron and possible Hematology referral   - Strongly recommend she complete colonoscopy, ideally prior to surgery  - Mammogram ordered     Rachelle Miranda MD  Division of Minimally Invasive Gynecologic Surgery  [facility]         [1]   Past Medical History:  Diagnosis Date    Breast cancer     Current smoker 2024   [2]   Past Surgical History:  Procedure Laterality Date    MASTECTOMY      OTHER SURGICAL HISTORY  2022    Right mastectomy

## 2025-07-29 ENCOUNTER — PHARMACY VISIT (OUTPATIENT)
Dept: PHARMACY | Facility: CLINIC | Age: 75
End: 2025-07-29
Payer: MEDICARE

## 2025-07-29 PROCEDURE — RXMED WILLOW AMBULATORY MEDICATION CHARGE

## 2025-07-30 LAB
ERYTHROCYTE [DISTWIDTH] IN BLOOD BY AUTOMATED COUNT: 18.3 % (ref 11–15)
FERRITIN SERPL-MCNC: 7 NG/ML (ref 16–288)
HCT VFR BLD AUTO: 31.7 % (ref 35–45)
HGB BLD-MCNC: 9.6 G/DL (ref 11.7–15.5)
IRON SATN MFR SERPL: 9 % (CALC) (ref 16–45)
IRON SERPL-MCNC: 37 MCG/DL (ref 45–160)
MCH RBC QN AUTO: 25.1 PG (ref 27–33)
MCHC RBC AUTO-ENTMCNC: 30.3 G/DL (ref 32–36)
MCV RBC AUTO: 82.8 FL (ref 80–100)
PLATELET # BLD AUTO: 290 THOUSAND/UL (ref 140–400)
PMV BLD REES-ECKER: 12.2 FL (ref 7.5–12.5)
RBC # BLD AUTO: 3.83 MILLION/UL (ref 3.8–5.1)
TIBC SERPL-MCNC: 402 MCG/DL (CALC) (ref 250–450)
WBC # BLD AUTO: 5.1 THOUSAND/UL (ref 3.8–10.8)

## 2025-07-31 ENCOUNTER — HOSPITAL ENCOUNTER (OUTPATIENT)
Dept: RADIOLOGY | Facility: HOSPITAL | Age: 75
Discharge: HOME | End: 2025-07-31
Payer: MEDICARE

## 2025-07-31 VITALS — WEIGHT: 146 LBS | BODY MASS INDEX: 24.92 KG/M2 | HEIGHT: 64 IN

## 2025-07-31 DIAGNOSIS — Z12.31 ENCOUNTER FOR SCREENING MAMMOGRAM FOR MALIGNANT NEOPLASM OF BREAST: ICD-10-CM

## 2025-07-31 PROCEDURE — 77067 SCR MAMMO BI INCL CAD: CPT | Mod: LEFT SIDE | Performed by: RADIOLOGY

## 2025-07-31 PROCEDURE — 77063 BREAST TOMOSYNTHESIS BI: CPT | Mod: LEFT SIDE | Performed by: RADIOLOGY

## 2025-07-31 PROCEDURE — 77061 BREAST TOMOSYNTHESIS UNI: CPT | Mod: LT

## 2025-08-08 ENCOUNTER — CLINICAL SUPPORT (OUTPATIENT)
Dept: PREADMISSION TESTING | Facility: HOSPITAL | Age: 75
End: 2025-08-08
Payer: MEDICARE

## 2025-08-11 ENCOUNTER — LAB (OUTPATIENT)
Dept: LAB | Facility: HOSPITAL | Age: 75
End: 2025-08-11
Payer: MEDICARE

## 2025-08-11 ENCOUNTER — PRE-ADMISSION TESTING (OUTPATIENT)
Dept: PREADMISSION TESTING | Facility: HOSPITAL | Age: 75
End: 2025-08-11
Payer: MEDICARE

## 2025-08-11 VITALS
DIASTOLIC BLOOD PRESSURE: 74 MMHG | WEIGHT: 143.3 LBS | HEIGHT: 64 IN | HEART RATE: 90 BPM | TEMPERATURE: 98 F | SYSTOLIC BLOOD PRESSURE: 124 MMHG | OXYGEN SATURATION: 96 % | BODY MASS INDEX: 24.46 KG/M2 | RESPIRATION RATE: 18 BRPM

## 2025-08-11 DIAGNOSIS — N81.4 UTEROVAGINAL PROLAPSE, UNSPECIFIED: Primary | ICD-10-CM

## 2025-08-11 DIAGNOSIS — R30.0 DYSURIA: ICD-10-CM

## 2025-08-11 DIAGNOSIS — J44.9 CHRONIC OBSTRUCTIVE PULMONARY DISEASE, UNSPECIFIED COPD TYPE (MULTI): Primary | ICD-10-CM

## 2025-08-11 DIAGNOSIS — N81.4 CYSTOCELE WITH UTERINE PROLAPSE: ICD-10-CM

## 2025-08-11 DIAGNOSIS — D21.9 BENIGN NEOPLASM OF CONNECTIVE AND OTHER SOFT TISSUE, UNSPECIFIED: ICD-10-CM

## 2025-08-11 LAB
ANION GAP SERPL CALC-SCNC: 14 MMOL/L (ref 10–20)
APPEARANCE UR: ABNORMAL
BILIRUB UR STRIP.AUTO-MCNC: NEGATIVE MG/DL
BUN SERPL-MCNC: 25 MG/DL (ref 6–23)
CALCIUM SERPL-MCNC: 9.5 MG/DL (ref 8.6–10.3)
CHLORIDE SERPL-SCNC: 107 MMOL/L (ref 98–107)
CO2 SERPL-SCNC: 21 MMOL/L (ref 21–32)
COLOR UR: YELLOW
CREAT SERPL-MCNC: 0.97 MG/DL (ref 0.5–1.05)
EGFRCR SERPLBLD CKD-EPI 2021: 61 ML/MIN/1.73M*2
ERYTHROCYTE [DISTWIDTH] IN BLOOD BY AUTOMATED COUNT: 19 % (ref 11.5–14.5)
GLUCOSE SERPL-MCNC: 85 MG/DL (ref 74–99)
GLUCOSE UR STRIP.AUTO-MCNC: NORMAL MG/DL
HCT VFR BLD AUTO: 30.9 % (ref 36–46)
HGB BLD-MCNC: 9.1 G/DL (ref 12–16)
KETONES UR STRIP.AUTO-MCNC: NEGATIVE MG/DL
LEUKOCYTE ESTERASE UR QL STRIP.AUTO: ABNORMAL
MCH RBC QN AUTO: 24.7 PG (ref 26–34)
MCHC RBC AUTO-ENTMCNC: 29.4 G/DL (ref 32–36)
MCV RBC AUTO: 84 FL (ref 80–100)
MUCOUS THREADS #/AREA URNS AUTO: NORMAL /LPF
NITRITE UR QL STRIP.AUTO: NEGATIVE
NRBC BLD-RTO: 0 /100 WBCS (ref 0–0)
PH UR STRIP.AUTO: 5.5 [PH]
PLATELET # BLD AUTO: 233 X10*3/UL (ref 150–450)
POTASSIUM SERPL-SCNC: 3.7 MMOL/L (ref 3.5–5.3)
PROT UR STRIP.AUTO-MCNC: NEGATIVE MG/DL
RBC # BLD AUTO: 3.68 X10*6/UL (ref 4–5.2)
RBC # UR STRIP.AUTO: NEGATIVE MG/DL
RBC #/AREA URNS AUTO: NORMAL /HPF
SODIUM SERPL-SCNC: 138 MMOL/L (ref 136–145)
SP GR UR STRIP.AUTO: 1.02
SQUAMOUS #/AREA URNS AUTO: NORMAL /HPF
UROBILINOGEN UR STRIP.AUTO-MCNC: NORMAL MG/DL
WBC # BLD AUTO: 5.1 X10*3/UL (ref 4.4–11.3)
WBC #/AREA URNS AUTO: NORMAL /HPF

## 2025-08-11 PROCEDURE — 85027 COMPLETE CBC AUTOMATED: CPT

## 2025-08-11 PROCEDURE — 87086 URINE CULTURE/COLONY COUNT: CPT | Mod: AHULAB | Performed by: NURSE PRACTITIONER

## 2025-08-11 PROCEDURE — 86900 BLOOD TYPING SEROLOGIC ABO: CPT

## 2025-08-11 PROCEDURE — 99204 OFFICE O/P NEW MOD 45 MIN: CPT | Performed by: NURSE PRACTITIONER

## 2025-08-11 PROCEDURE — 81001 URINALYSIS AUTO W/SCOPE: CPT | Performed by: NURSE PRACTITIONER

## 2025-08-11 PROCEDURE — 93010 ELECTROCARDIOGRAM REPORT: CPT | Performed by: INTERNAL MEDICINE

## 2025-08-11 PROCEDURE — 86850 RBC ANTIBODY SCREEN: CPT

## 2025-08-11 PROCEDURE — 36415 COLL VENOUS BLD VENIPUNCTURE: CPT

## 2025-08-11 PROCEDURE — RXMED WILLOW AMBULATORY MEDICATION CHARGE

## 2025-08-11 PROCEDURE — 80048 BASIC METABOLIC PNL TOTAL CA: CPT

## 2025-08-11 PROCEDURE — 87081 CULTURE SCREEN ONLY: CPT | Mod: 59,AHULAB | Performed by: NURSE PRACTITIONER

## 2025-08-11 PROCEDURE — 86901 BLOOD TYPING SEROLOGIC RH(D): CPT

## 2025-08-11 PROCEDURE — 93005 ELECTROCARDIOGRAM TRACING: CPT | Performed by: NURSE PRACTITIONER

## 2025-08-11 RX ORDER — CHLORHEXIDINE GLUCONATE ORAL RINSE 1.2 MG/ML
SOLUTION DENTAL
Qty: 473 ML | Refills: 0 | Status: SHIPPED | OUTPATIENT
Start: 2025-08-11

## 2025-08-11 ASSESSMENT — ENCOUNTER SYMPTOMS
GASTROINTESTINAL NEGATIVE: 1
CONSTITUTIONAL NEGATIVE: 1
RESPIRATORY NEGATIVE: 1
CARDIOVASCULAR NEGATIVE: 1
NECK NEGATIVE: 1
NEUROLOGICAL NEGATIVE: 1
ENDOCRINE NEGATIVE: 1
ARTHRALGIAS: 1

## 2025-08-12 LAB
ABO GROUP (TYPE) IN BLOOD: NORMAL
ANTIBODY SCREEN: NORMAL
RH FACTOR (ANTIGEN D): NORMAL

## 2025-08-13 LAB
ATRIAL RATE: 89 BPM
BACTERIA UR CULT: NORMAL
P AXIS: 55 DEGREES
P OFFSET: 199 MS
P ONSET: 148 MS
PR INTERVAL: 156 MS
Q ONSET: 226 MS
QRS COUNT: 14 BEATS
QRS DURATION: 80 MS
QT INTERVAL: 392 MS
QTC CALCULATION(BAZETT): 476 MS
QTC FREDERICIA: 446 MS
R AXIS: 16 DEGREES
STAPHYLOCOCCUS SPEC CULT: NORMAL
T AXIS: 46 DEGREES
T OFFSET: 422 MS
VENTRICULAR RATE: 89 BPM

## 2025-08-14 ENCOUNTER — PHARMACY VISIT (OUTPATIENT)
Dept: PHARMACY | Facility: CLINIC | Age: 75
End: 2025-08-14
Payer: MEDICARE

## 2025-08-14 DIAGNOSIS — D50.9 IRON DEFICIENCY ANEMIA, UNSPECIFIED IRON DEFICIENCY ANEMIA TYPE: Primary | ICD-10-CM

## 2025-08-14 DIAGNOSIS — Z12.12 ENCOUNTER FOR COLORECTAL CANCER SCREENING USING COLOGUARD TEST: ICD-10-CM

## 2025-08-14 DIAGNOSIS — Z12.11 ENCOUNTER FOR COLORECTAL CANCER SCREENING USING COLOGUARD TEST: ICD-10-CM

## 2025-08-14 PROCEDURE — RXMED WILLOW AMBULATORY MEDICATION CHARGE

## 2025-08-14 RX ORDER — FERROUS SULFATE 325(65) MG
325 TABLET, DELAYED RELEASE (ENTERIC COATED) ORAL
Qty: 90 TABLET | Refills: 1 | Status: SHIPPED | OUTPATIENT
Start: 2025-08-14 | End: 2026-02-10

## 2025-08-20 ENCOUNTER — PHARMACY VISIT (OUTPATIENT)
Dept: PHARMACY | Facility: CLINIC | Age: 75
End: 2025-08-20
Payer: COMMERCIAL

## 2025-09-08 ENCOUNTER — APPOINTMENT (OUTPATIENT)
Dept: OBSTETRICS AND GYNECOLOGY | Facility: CLINIC | Age: 75
End: 2025-09-08
Payer: MEDICARE

## 2025-10-06 ENCOUNTER — APPOINTMENT (OUTPATIENT)
Dept: OBSTETRICS AND GYNECOLOGY | Facility: CLINIC | Age: 75
End: 2025-10-06
Payer: MEDICARE

## 2025-10-07 ENCOUNTER — APPOINTMENT (OUTPATIENT)
Dept: OBSTETRICS AND GYNECOLOGY | Facility: CLINIC | Age: 75
End: 2025-10-07
Payer: MEDICARE